# Patient Record
Sex: FEMALE | Race: WHITE | Employment: FULL TIME | URBAN - METROPOLITAN AREA
[De-identification: names, ages, dates, MRNs, and addresses within clinical notes are randomized per-mention and may not be internally consistent; named-entity substitution may affect disease eponyms.]

---

## 2017-01-05 ENCOUNTER — OFFICE VISIT (OUTPATIENT)
Dept: URGENT CARE | Facility: URGENT CARE | Age: 62
End: 2017-01-05
Payer: COMMERCIAL

## 2017-01-05 VITALS
DIASTOLIC BLOOD PRESSURE: 90 MMHG | SYSTOLIC BLOOD PRESSURE: 144 MMHG | OXYGEN SATURATION: 95 % | HEART RATE: 73 BPM | TEMPERATURE: 98.4 F

## 2017-01-05 DIAGNOSIS — J06.9 VIRAL URI WITH COUGH: Primary | ICD-10-CM

## 2017-01-05 DIAGNOSIS — J02.9 SORE THROAT: ICD-10-CM

## 2017-01-05 LAB
DEPRECATED S PYO AG THROAT QL EIA: NORMAL
MICRO REPORT STATUS: NORMAL
SPECIMEN SOURCE: NORMAL

## 2017-01-05 PROCEDURE — 87880 STREP A ASSAY W/OPTIC: CPT | Performed by: PHYSICIAN ASSISTANT

## 2017-01-05 PROCEDURE — 87081 CULTURE SCREEN ONLY: CPT | Performed by: PHYSICIAN ASSISTANT

## 2017-01-05 PROCEDURE — 99213 OFFICE O/P EST LOW 20 MIN: CPT | Performed by: PHYSICIAN ASSISTANT

## 2017-01-05 RX ORDER — BENZONATATE 200 MG/1
200 CAPSULE ORAL 3 TIMES DAILY PRN
Qty: 21 CAPSULE | Refills: 0 | Status: SHIPPED | OUTPATIENT
Start: 2017-01-05 | End: 2017-09-22

## 2017-01-05 NOTE — PROGRESS NOTES
"SUBJECTIVE:  Breonna Caruso presents to clinic today for evaluation of sore throat and a mild dry, non-productive cough x 3-4 days duration.  Positive hx of asthma.  She has had some associated wheezing.  She has current Albuterol MDI and has used it prn (reportedly with good response).  No severe SOB.  Patient tells me she has responded well to use of Tessalon Perles in past and would like an rx.       Patient denies any F/C/N/V/D, rash, abdominal pain or any other acute illness sxs.    Despite above acute illness sxs, patient still alert, active and taking in PO solids and fluids.  Normal BM's and urination.           OBJECTIVE:  /90 mmHg  Pulse 73  Temp(Src) 98.4  F (36.9  C) (Oral)  SpO2 95%    Vital Signs 7/10/2015 8/21/2015 8/21/2015 1/21/2016 6/17/2016   Systolic 134  138 140 120   Diastolic 76  84 99 90   Pulse   74 86 71   Temperature   98.1 98.1 97.9   Respirations 16       Weight (LB)   155 lb 1.6 oz 153 lb 11.2 oz 154 lb   Height    5' 5\" 5' 6\"   BMI    25.63 24.91   Peak Flow        Pain  5      O2 94  95 97 95     General appearance: alert and no apparent distress  Skin color is pink and without rash.  HEENT:   Conjunctiva not injected.  Sclera clear.  Left TM is normal: no effusions, no erythema, and normal landmarks.  Right TM is normal: no effusions, no erythema, and normal landmarks.  Nasal mucosa is normal.  Oropharyngeal exam is positive for mild, diffuse, erythema.  No plaque, exudate, lesions, or ulcers.   NBeck is supple with no adenopathy  CARDIAC:NORMAL - regular rate and rhythm without murmur.  RESP: Normal - CTA without rales, rhonchi, or wheezing.    Rapid Strep: Negative     ASSESSMENT/PLAN:    (J06.9,  B97.89) Viral URI with cough  (primary encounter diagnosis)  Plan: benzonatate (TESSALON) 200 MG capsule          Follow-up with PCPbif sxs change, worsen or fail to resolve with home comfort care measures over the next 5-7 days.  Discussed need to watch asthma and wheezing sxs " "closely.  No indication to start prednisone now but patient educated she may need prednisone if Albuterol rescue medication becomes insufficient to control wheezing.      In addition to the above, viral URI with wheezing\"red flag\" signs and sxs are reviewed with pt both verbally and by way of printed educational material for home review.  Pt verbalizes understanding of and agrees to the above plan.         (J02.9) Sore throat  Plan: Strep, Rapid Screen, Beta strep group A culture     As per above           "

## 2017-01-05 NOTE — PATIENT INSTRUCTIONS
* VIRAL RESPIRATORY ILLNESS w/ Wheezing [Adult]  You have an Upper Respiratory Illness (URI) caused by a virus. This illness is contagious during the first few days. It is spread through the air by coughing and sneezing or by direct contact (touching the sick person and then touching your own eyes, nose or mouth). When the infection causes a lot of irritation, the air passages can go into spasm. This causes wheezing and shortness of breath.    Most viral illnesses resolve within 7-10 days with rest and simple home remedies, although the illness may sometimes last for several weeks. Antibiotics will not kill a virus and are generally not prescribed for this condition.  HOME CARE:    If symptoms are severe, rest at home for the first 2-3 days. When resuming activity, don't let yourself become overly tired.    Avoid exposure to cigarette smoke (yours or others).    You may use acetaminophen (Tylenol) 650-1000 mg every 6 hours or ibuprofen (Motrin, Advil) 600 mg every 6-8 hours with food to control pain, if you are able to take these medicines. [ NOTE : If you have chronic liver or kidney disease or ever had a stomach ulcer or GI bleeding, talk with your doctor before using these medicines.] (Aspirin should never be used in anyone under 18 years of age who is ill with a fever. It may cause severe liver damage.    Your appetite may be poor so a light diet is fine. Avoid dehydration by drinking 6-8 glasses of fluids per day (water, soft drinks, juices, tea, soup, etc.). Extra fluids will help loosen secretions in the nose and lungs.    Over-the-counter cold medicines will not shorten the duration of the illness, but may be helpful for the following symptoms: cough (Robitussin DM); sore throat (Chloraseptic lozenges or spray); nasal and sinus congestion (Actifed or Sudafed). [NOTE: Do not use decongestants if you have high blood pressure.]  FOLLOW UP with your doctor or as advised if you are not improving over the next  week.  GET PROMPT MEDICAL ATTENTION if any of the following occur:    Cough with lots of colored sputum (mucus) or blood in your sputum    Chest pain, shortness of breath, wheezing or difficulty breathing    Severe headache; face, neck or ear pain    Fever over 101 F (38.3 C) for more than three days    Unable to swallow due to throat pain    3190-8939 [employer] [address], [place] [id number]. All rights reserved. This information is not intended as a substitute for professional medical care. Always follow your healthcare professional's instructions.

## 2017-01-05 NOTE — MR AVS SNAPSHOT
After Visit Summary   1/5/2017    Breonna Caruso    MRN: [id number]           Patient Information     Date Of Birth          1955        Visit Information        Provider Department      1/5/2017 2:30 PM Jose M Molina PA-C [facility]        Today's Diagnoses     Sore throat    -  1       Care Instructions       * VIRAL RESPIRATORY ILLNESS w/ Wheezing [Adult]  You have an Upper Respiratory Illness (URI) caused by a virus. This illness is contagious during the first few days. It is spread through the air by coughing and sneezing or by direct contact (touching the sick person and then touching your own eyes, nose or mouth). When the infection causes a lot of irritation, the air passages can go into spasm. This causes wheezing and shortness of breath.    Most viral illnesses resolve within 7-10 days with rest and simple home remedies, although the illness may sometimes last for several weeks. Antibiotics will not kill a virus and are generally not prescribed for this condition.  HOME CARE:    If symptoms are severe, rest at home for the first 2-3 days. When resuming activity, don't let yourself become overly tired.    Avoid exposure to cigarette smoke (yours or others).    You may use acetaminophen (Tylenol) 650-1000 mg every 6 hours or ibuprofen (Motrin, Advil) 600 mg every 6-8 hours with food to control pain, if you are able to take these medicines. [ NOTE : If you have chronic liver or kidney disease or ever had a stomach ulcer or GI bleeding, talk with your doctor before using these medicines.] (Aspirin should never be used in anyone under 18 years of age who is ill with a fever. It may cause severe liver damage.    Your appetite may be poor so a light diet is fine. Avoid dehydration by drinking 6-8 glasses of fluids per day (water, soft drinks, juices, tea, soup, etc.). Extra fluids will help loosen secretions in the nose and lungs.    Over-the-counter cold  medicines will not shorten the duration of the illness, but may be helpful for the following symptoms: cough (Robitussin DM); sore throat (Chloraseptic lozenges or spray); nasal and sinus congestion (Actifed or Sudafed). [NOTE: Do not use decongestants if you have high blood pressure.]  FOLLOW UP with your doctor or as advised if you are not improving over the next week.  GET PROMPT MEDICAL ATTENTION if any of the following occur:    Cough with lots of colored sputum (mucus) or blood in your sputum    Chest pain, shortness of breath, wheezing or difficulty breathing    Severe headache; face, neck or ear pain    Fever over 101 F (38.3 C) for more than three days    Unable to swallow due to throat pain    3257-0716 [facility], [address] [place] [address]. All rights reserved. This information is not intended as a substitute for professional medical care. Always follow your healthcare professional's instructions.          Follow-ups after your visit        Who to contact     If you have questions or need follow up information about today's clinic visit or your schedule please contact [facility] directly at [phone].  Normal or non-critical lab and imaging results will be communicated to you by BoostUphart, letter or phone within 4 business days after the clinic has received the results. If you do not hear from us within 7 days, please contact the clinic through BoostUphart or phone. If you have a critical or abnormal lab result, we will notify you by phone as soon as possible.  Submit refill requests through 1jiajie or call your pharmacy and they will forward the refill request to us. Please allow 3 business days for your refill to be completed.          Additional Information About Your Visit        BoostUphart Information     1jiajie gives you secure access to your electronic health record. If you see a primary care provider, you can also send messages to your care team and make  appointments. If you have questions, please call your primary care clinic.  If you do not have a primary care provider, please call [phone] and they will assist you.        Care EveryWhere ID     This is your Care EveryWhere ID. This could be used by other organizations to access your [facility] medical records  [id number]        Your Vitals Were     Pulse Temperature Pulse Oximetry             73 98.4  F (36.9  C) (Oral) 95%          Blood Pressure from Last 3 Encounters:   01/05/17 144/90   11/21/16 124/82   06/17/16 120/90    Weight from Last 3 Encounters:   11/21/16 153 lb (69.4 kg)   06/17/16 154 lb (69.854 kg)   01/21/16 153 lb 11.2 oz (69.718 kg)              We Performed the Following     Beta strep group A culture     Strep, Rapid Screen        Primary Care Provider Office Phone # Fax #    Ramona Ann Aaseby-Aguilera, PA-C [phone]       [facility] [address] [facility] [id number]        Thank you!     Thank you for choosing [facility]  for your care. Our goal is always to provide you with excellent care. Hearing back from our patients is one way we can continue to improve our services. Please take a few minutes to complete the written survey that you may receive in the mail after your visit with us. Thank you!             Your Updated Medication List - Protect others around you: Learn how to safely use, store and throw away your medicines at www.disposemymeds.org.          This list is accurate as of: 1/5/17  3:44 PM.  Always use your most recent med list.                   Brand Name Dispense Instructions for use    albuterol 108 (90 BASE) MCG/ACT Inhaler    VENTOLIN HFA    3 Inhaler    Inhale 1-2 puffs into the lungs 4 times daily as needed       beclomethasone 80 MCG/ACT Inhaler    QVAR    3 Inhaler    Inhale 2 puffs into the lungs 2 times daily with adaptor       buPROPion 200 MG 12 hr tablet    WELLBUTRIN SR    180 tablet    Take 1 tablet (200  mg) by mouth 2 times daily       DULoxetine 20 MG EC capsule    CYMBALTA    90 capsule    Take 1 capsule (20 mg) by mouth daily       esomeprazole 20 MG CR capsule    nexIUM    30 capsule    Take 1 capsule (20 mg) by mouth every morning (before breakfast) Take 30-60 minutes before eating.       estrogens-methylTESTOSTERone 1.25-2.5 MG per tablet    ESTRATEST    90 tablet    Take 1 tablet by mouth daily       fexofenadine 180 MG tablet    ALLEGRA    90 tablet    Take 1 tablet by mouth daily.       fluticasone 50 MCG/ACT spray    FLONASE    48 g    Spray 2 sprays into both nostrils daily       lisinopril 30 MG tablet    PRINIVIL,ZESTRIL    90 tablet    Take 1 tablet (30 mg) by mouth daily       simvastatin 40 MG tablet    ZOCOR    90 tablet    Take 1 tablet (40 mg) by mouth At Bedtime       SUMAtriptan 5 MG/ACT nasal spray    IMITREX    1 Inhaler    Spray 1 spray (5 mg) in nostril as needed for migraine       tretinoin 0.05 % cream    RETIN-A    45 g    Spread a pea size amount into affected area.  Use sunscreen SPF>20.

## 2017-01-05 NOTE — NURSING NOTE
"Chief Complaint   Patient presents with     Urgent Care     Pharyngitis     cough and ST since Monday.     Initial /90 mmHg  Pulse 73  Temp(Src) 98.4  F (36.9  C) (Oral)  SpO2 95% Estimated body mass index is 24.71 kg/(m^2) as calculated from the following:    Height as of 11/21/16: 5' 6\" (1.676 m).    Weight as of 11/21/16: 153 lb (69.4 kg).  BP completed using cuff size  regular    Traci Christopher/NELY    "

## 2017-01-07 LAB
BACTERIA SPEC CULT: NORMAL
MICRO REPORT STATUS: NORMAL
SPECIMEN SOURCE: NORMAL

## 2017-01-31 DIAGNOSIS — E78.5 DYSLIPIDEMIA: ICD-10-CM

## 2017-01-31 DIAGNOSIS — R68.82 DECREASED LIBIDO: Primary | ICD-10-CM

## 2017-01-31 DIAGNOSIS — F33.0 MAJOR DEPRESSIVE DISORDER, RECURRENT EPISODE, MILD (H): ICD-10-CM

## 2017-01-31 NOTE — TELEPHONE ENCOUNTER
Pt is calling in as she has to pay out of pocket $304.48 every 90 days for her EstraTest tablet.  Because it is compound her insurance will no longer cover it.  Is there another med she can try.    Explained that she needs to call her insurance co and ask if there is an alternative medication in the same family of medication that would be covered and to asked at what out of pocket expense.  She agreed to call her insurance and call us back once she know some alternatives    Do you have any meds for an alternative ?    Alessandro ZUÑIGA RN  .

## 2017-01-31 NOTE — TELEPHONE ENCOUNTER
Duloxetine 20mg caps    Last Written Prescription Date: 01/21/2016  Last Fill Quantity: 90, # refills: 3  Last Office Visit with [email or web address] or  Health prescribing provider: 06/07/2016        BP Readings from Last 3 Encounters:   01/05/17 144/90   11/21/16 124/82   06/17/16 120/90     Pulse: (for Fetzima)  CREATININE   Date Value Ref Range Status   01/23/2016 0.79 0.52 - 1.04 mg/dL Final   ]    Last PHQ-9 score on record=   PHQ-9 SCORE 11/21/2016   Total Score -   Total Score 3       Bupropion er 200mg tabs       Last Written Prescription Date: 01/21/2016  Last Fill Quantity: 180; # refills: 3  Last Office Visit with [email or web address] or  Health prescribing provider:  06/07/2016        Last PHQ-9 score on record=   PHQ-9 SCORE 11/21/2016   Total Score -   Total Score 3       AST       11   1/23/2016  ALT       28   1/23/2016    Simvastatin 40mg tabs     Last Written Prescription Date: 01/21/2016  Last Fill Quantity: 90, # refills: 3  Last Office Visit with [email or web address] or  Health prescribing provider: 06/07/2016       CHOL      168   1/23/2016  HDL       40   1/23/2016  LDL      107   1/23/2016  TRIG      104   1/23/2016  CHOLHDLRATIO      3.3   1/16/2015

## 2017-02-01 RX ORDER — SIMVASTATIN 40 MG
40 TABLET ORAL AT BEDTIME
Qty: 90 TABLET | Refills: 0 | Status: SHIPPED | OUTPATIENT
Start: 2017-02-01 | End: 2017-05-18

## 2017-02-01 RX ORDER — DULOXETIN HYDROCHLORIDE 20 MG/1
20 CAPSULE, DELAYED RELEASE ORAL DAILY
Qty: 90 CAPSULE | Refills: 0 | Status: SHIPPED | OUTPATIENT
Start: 2017-02-01 | End: 2017-05-15

## 2017-02-01 RX ORDER — ESTERIFIED ESTROGEN AND METHYLTESTOSTERONE 1.25; 2.5 MG/1; MG/1
1 TABLET ORAL DAILY
Qty: 90 TABLET | Refills: 3 | Status: SHIPPED | OUTPATIENT
Start: 2017-02-01 | End: 2017-05-09

## 2017-02-01 RX ORDER — BUPROPION HYDROCHLORIDE 200 MG/1
200 TABLET, EXTENDED RELEASE ORAL 2 TIMES DAILY
Qty: 180 TABLET | Refills: 0 | Status: SHIPPED | OUTPATIENT
Start: 2017-02-01 | End: 2017-05-18

## 2017-02-01 NOTE — TELEPHONE ENCOUNTER
Medication is being filled for 1 time refill only due to:  Patient needs to be seen because needs yearly OV and labs.   Poonam Taylor RN, BSN

## 2017-02-01 NOTE — TELEPHONE ENCOUNTER
I will forward these refill requests to the clinic.  It has been over 6 months since the last office visit (duloxetine/bupropion), and the labs for the simvastatin are out of date.    Thank you,    Lv Varela, [facility]  Pharmacy Services  [address] [place] [address]  [phone]

## 2017-03-01 NOTE — TELEPHONE ENCOUNTER
rx has been filled by [facility] on 2/1/17 and covered by insurance.  Encounter closed  Winifred Meng CMA

## 2017-03-21 DIAGNOSIS — I10 HTN, GOAL BELOW 140/90: ICD-10-CM

## 2017-03-21 RX ORDER — LISINOPRIL 30 MG/1
30 TABLET ORAL DAILY
Qty: 90 TABLET | Refills: 0 | Status: CANCELLED | OUTPATIENT
Start: 2017-03-21

## 2017-03-21 NOTE — TELEPHONE ENCOUNTER
Routing refill request to provider for review/approval because:  Labs not current:  Appears due for f/u in May    Delfina Tovar, RN

## 2017-03-21 NOTE — TELEPHONE ENCOUNTER
Lisinopril      Last Written Prescription Date: 12/6/2016  Last Fill Quantity: 90, # refills: 0  Last Office Visit with G, P or [facility] prescribing provider: 6/17/2016       Potassium   Date Value Ref Range Status   01/23/2016 4.0 3.4 - 5.3 mmol/L Final     Creatinine   Date Value Ref Range Status   01/23/2016 0.79 0.52 - 1.04 mg/dL Final     BP Readings from Last 3 Encounters:   01/05/17 144/90   11/21/16 124/82   06/17/16 120/90

## 2017-03-23 DIAGNOSIS — R68.82 DECREASED LIBIDO: ICD-10-CM

## 2017-03-23 RX ORDER — BUPROPION HYDROCHLORIDE 200 MG/1
TABLET, EXTENDED RELEASE ORAL
Qty: 180 TABLET | Refills: 0 | OUTPATIENT
Start: 2017-03-23

## 2017-03-23 RX ORDER — LISINOPRIL 30 MG/1
30 TABLET ORAL DAILY
Qty: 90 TABLET | Refills: 0 | Status: SHIPPED | OUTPATIENT
Start: 2017-03-23 | End: 2017-05-18

## 2017-03-23 NOTE — TELEPHONE ENCOUNTER
LM for call back pt needs f/u OV     This was filled for 90 day RX on 2/1/17 should have meds through May.     Delfina Tovar RN

## 2017-03-23 NOTE — TELEPHONE ENCOUNTER
Pending Prescriptions:                       Disp   Refills    buPROPion (WELLBUTRIN SR) 200 MG 12 hr ta*180 ta*0            Sig: TAKE ONE TABLET BY MOUTH TWICE A DAY    Bupropion       Last Written Prescription Date: 02/01/2017  Last Fill Quantity: 180; # refills: 0  Last Office Visit with [facility], [facility] or [facility] prescribing provider:  11/21/2016        Last PHQ-9 score on record=   PHQ-9 SCORE 11/21/2016   Total Score -   Total Score 3       Lab Results   Component Value Date    AST 11 01/23/2016     Lab Results   Component Value Date    ALT 28 01/23/2016     Kalpana Alexander

## 2017-05-05 DIAGNOSIS — R68.82 DECREASED LIBIDO: ICD-10-CM

## 2017-05-05 RX ORDER — ESTERIFIED ESTROGEN AND METHYLTESTOSTERONE 1.25; 2.5 MG/1; MG/1
1 TABLET ORAL DAILY
Qty: 90 TABLET | Refills: 3 | OUTPATIENT
Start: 2017-05-05

## 2017-05-05 NOTE — TELEPHONE ENCOUNTER
Est Estrogrens-Methylt      Last Written Prescription Date: 2/1/2017  Last Fill Quantity: 90,  # refills: 3   Last Office Visit with [facility], [facility] or [facility] prescribing provider: 6/17/2016

## 2017-05-09 DIAGNOSIS — R68.82 DECREASED LIBIDO: ICD-10-CM

## 2017-05-09 RX ORDER — ESTERIFIED ESTROGEN AND METHYLTESTOSTERONE 1.25; 2.5 MG/1; MG/1
1 TABLET ORAL DAILY
Qty: 90 TABLET | Refills: 2 | Status: SHIPPED | OUTPATIENT
Start: 2017-05-09 | End: 2017-11-30

## 2017-05-09 NOTE — TELEPHONE ENCOUNTER
Est Estrogens    - rx never received new rx  Last Written Prescription Date: 2/1/2017  Last Fill Quantity: 90,  # refills: 3   Last Office Visit with [facility], [facility] or [facility] prescribing provider: 6/17/2016

## 2017-05-18 ENCOUNTER — OFFICE VISIT (OUTPATIENT)
Dept: FAMILY MEDICINE | Facility: CLINIC | Age: 62
End: 2017-05-18
Payer: COMMERCIAL

## 2017-05-18 VITALS
SYSTOLIC BLOOD PRESSURE: 133 MMHG | DIASTOLIC BLOOD PRESSURE: 80 MMHG | HEART RATE: 77 BPM | TEMPERATURE: 97.8 F | WEIGHT: 149 LBS | BODY MASS INDEX: 23.95 KG/M2 | HEIGHT: 66 IN | OXYGEN SATURATION: 94 %

## 2017-05-18 DIAGNOSIS — Z13.29 SCREENING FOR THYROID DISORDER: ICD-10-CM

## 2017-05-18 DIAGNOSIS — R68.82 DECREASED LIBIDO: ICD-10-CM

## 2017-05-18 DIAGNOSIS — I10 HTN, GOAL BELOW 140/90: ICD-10-CM

## 2017-05-18 DIAGNOSIS — F33.0 MAJOR DEPRESSIVE DISORDER, RECURRENT EPISODE, MILD (H): ICD-10-CM

## 2017-05-18 DIAGNOSIS — Z13.0 SCREENING FOR DISORDER OF BLOOD AND BLOOD-FORMING ORGANS: ICD-10-CM

## 2017-05-18 DIAGNOSIS — Z13.220 LIPID SCREENING: ICD-10-CM

## 2017-05-18 DIAGNOSIS — Z13.1 SCREENING FOR DIABETES MELLITUS: ICD-10-CM

## 2017-05-18 DIAGNOSIS — E78.5 DYSLIPIDEMIA: ICD-10-CM

## 2017-05-18 DIAGNOSIS — Z11.59 NEED FOR HEPATITIS C SCREENING TEST: ICD-10-CM

## 2017-05-18 LAB
ALBUMIN SERPL-MCNC: 4 G/DL (ref 3.4–5)
ALP SERPL-CCNC: 46 U/L (ref 40–150)
ALT SERPL W P-5'-P-CCNC: 71 U/L (ref 0–50)
ANION GAP SERPL CALCULATED.3IONS-SCNC: 6 MMOL/L (ref 3–14)
AST SERPL W P-5'-P-CCNC: 34 U/L (ref 0–45)
BILIRUB SERPL-MCNC: 0.4 MG/DL (ref 0.2–1.3)
BUN SERPL-MCNC: 14 MG/DL (ref 7–30)
CALCIUM SERPL-MCNC: 8.9 MG/DL (ref 8.5–10.1)
CHLORIDE SERPL-SCNC: 106 MMOL/L (ref 94–109)
CHOLEST SERPL-MCNC: 170 MG/DL
CO2 SERPL-SCNC: 29 MMOL/L (ref 20–32)
CREAT SERPL-MCNC: 0.71 MG/DL (ref 0.52–1.04)
ERYTHROCYTE [DISTWIDTH] IN BLOOD BY AUTOMATED COUNT: 13 % (ref 10–15)
GFR SERPL CREATININE-BSD FRML MDRD: 83 ML/MIN/1.7M2
GLUCOSE SERPL-MCNC: 93 MG/DL (ref 70–99)
HCT VFR BLD AUTO: 44.4 % (ref 35–47)
HCV AB SERPL QL IA: NORMAL
HDLC SERPL-MCNC: 46 MG/DL
HGB BLD-MCNC: 14.9 G/DL (ref 11.7–15.7)
LDLC SERPL CALC-MCNC: 104 MG/DL
MCH RBC QN AUTO: 31.5 PG (ref 26.5–33)
MCHC RBC AUTO-ENTMCNC: 33.6 G/DL (ref 31.5–36.5)
MCV RBC AUTO: 94 FL (ref 78–100)
NONHDLC SERPL-MCNC: 124 MG/DL
PLATELET # BLD AUTO: 231 10E9/L (ref 150–450)
POTASSIUM SERPL-SCNC: 4.1 MMOL/L (ref 3.4–5.3)
PROT SERPL-MCNC: 7.1 G/DL (ref 6.8–8.8)
RBC # BLD AUTO: 4.73 10E12/L (ref 3.8–5.2)
SODIUM SERPL-SCNC: 141 MMOL/L (ref 133–144)
TRIGL SERPL-MCNC: 98 MG/DL
TSH SERPL DL<=0.005 MIU/L-ACNC: 1.47 MU/L (ref 0.4–4)
WBC # BLD AUTO: 4.2 10E9/L (ref 4–11)

## 2017-05-18 PROCEDURE — 36415 COLL VENOUS BLD VENIPUNCTURE: CPT | Performed by: PHYSICIAN ASSISTANT

## 2017-05-18 PROCEDURE — 80061 LIPID PANEL: CPT | Performed by: PHYSICIAN ASSISTANT

## 2017-05-18 PROCEDURE — 80053 COMPREHEN METABOLIC PANEL: CPT | Performed by: PHYSICIAN ASSISTANT

## 2017-05-18 PROCEDURE — 84443 ASSAY THYROID STIM HORMONE: CPT | Performed by: PHYSICIAN ASSISTANT

## 2017-05-18 PROCEDURE — 99214 OFFICE O/P EST MOD 30 MIN: CPT | Performed by: PHYSICIAN ASSISTANT

## 2017-05-18 PROCEDURE — 86803 HEPATITIS C AB TEST: CPT | Performed by: PHYSICIAN ASSISTANT

## 2017-05-18 PROCEDURE — 85027 COMPLETE CBC AUTOMATED: CPT | Performed by: PHYSICIAN ASSISTANT

## 2017-05-18 RX ORDER — LISINOPRIL 30 MG/1
30 TABLET ORAL DAILY
Qty: 90 TABLET | Refills: 3 | Status: SHIPPED | OUTPATIENT
Start: 2017-05-18 | End: 2018-04-24

## 2017-05-18 RX ORDER — BUPROPION HYDROCHLORIDE 200 MG/1
200 TABLET, EXTENDED RELEASE ORAL 2 TIMES DAILY
Qty: 180 TABLET | Refills: 3 | Status: SHIPPED | OUTPATIENT
Start: 2017-05-18 | End: 2018-04-24

## 2017-05-18 RX ORDER — SIMVASTATIN 40 MG
40 TABLET ORAL AT BEDTIME
Qty: 90 TABLET | Refills: 3 | Status: SHIPPED | OUTPATIENT
Start: 2017-05-18 | End: 2017-09-22

## 2017-05-18 RX ORDER — DULOXETIN HYDROCHLORIDE 20 MG/1
20 CAPSULE, DELAYED RELEASE ORAL DAILY
Qty: 90 CAPSULE | Refills: 3 | Status: SHIPPED | OUTPATIENT
Start: 2017-05-18 | End: 2018-04-24

## 2017-05-18 NOTE — PROGRESS NOTES
SUBJECTIVE:                                                    Breonna Caruso is a 62 year old female who presents to clinic today for the following health issues:      Hyperlipidemia Follow-Up      Rate your low fat/cholesterol diet?: good    Taking statin?  Yes, no muscle aches from statin    Other lipid medications/supplements?:  none     Hypertension Follow-up      Outpatient blood pressures are not being checked.    Low Salt Diet: not monitoring salt       Depression and Anxiety Follow-Up    Status since last visit: No change    Other associated symptoms:None    Complicating factors:     Significant life event: No     Current substance abuse: None    PHQ-9 SCORE 1/21/2016 11/21/2016 5/18/2017   Total Score - - -   Total Score 0 3 0     BENNY-7 SCORE 9/29/2014 5/26/2015 1/21/2016   Total Score 0 3 -   Total Score - - 4        PHQ-9  English      PHQ-9   Any Language     GAD7     Asthma Follow-Up    Was ACT completed today?    Yes    ACT Total Scores 5/18/2017   ACT TOTAL SCORE (Goal Greater than or Equal to 20) 21   In the past 12 months, how many times did you visit the emergency room for your asthma without being admitted to the hospital? 0   In the past 12 months, how many times were you hospitalized overnight because of your asthma? 0       Recent asthma triggers that patient is dealing with: None        Amount of exercise or physical activity: None    Problems taking medications regularly: No    Medication side effects: none    Diet: regular (no restrictions)          Problem list and histories reviewed & adjusted, as indicated.  Additional history: as documented    Allergies   Allergen Reactions     Ciprofloxacin Swelling     Facial swelling     Codeine Sulfate      rash     Lorcet [Hydrocodone-Acetaminophen]      Penicillin G      Rash, hives     Sulfa Drugs      Rash, hives       Reviewed and updated as needed this visit by clinical staff  Tobacco  Allergies  Meds  Med Hx  Surg Hx  Fam Hx  Soc Hx  "     Reviewed and updated as needed this visit by Provider         ROS:  Constitutional, HEENT, cardiovascular, pulmonary, gi and gu systems are negative, except as otherwise noted.    OBJECTIVE:                                                    /80 (BP Location: Right arm, Patient Position: Chair, Cuff Size: Adult Regular)  Pulse 77  Temp 97.8  F (36.6  C) (Oral)  Ht 5' 6\" (1.676 m)  Wt 149 lb (67.6 kg)  SpO2 94%  BMI 24.05 kg/m2  Body mass index is 24.05 kg/(m^2).  GENERAL APPEARANCE: healthy, alert and no distress  HENT: ear canals and TM's normal and nose and mouth without ulcers or lesions  RESP: lungs clear to auscultation - no rales, rhonchi or wheezes  BREAST: normal without masses, tenderness or nipple discharge and no palpable axillary masses or adenopathy  CV: regular rates and rhythm, normal S1 S2, no S3 or S4 and no murmur, click or rub  LYMPHATICS: normal ant/post cervical and supraclavicular nodes  ABDOMEN: soft, nontender, without hepatosplenomegaly or masses and bowel sounds normal  MS: extremities normal- no gross deformities noted  SKIN: no suspicious lesions or rashes  PSYCH: mentation appears normal and affect normal/bright    Diagnostic test results:  Diagnostic Test Results:  none      ASSESSMENT/PLAN:                                                    1. Major depressive disorder, recurrent episode, mild (H)  Stable and doing well   - DULoxetine (CYMBALTA) 20 MG EC capsule; Take 1 capsule (20 mg) by mouth daily  Dispense: 90 capsule; Refill: 3    2. Dyslipidemia    - simvastatin (ZOCOR) 40 MG tablet; Take 1 tablet (40 mg) by mouth At Bedtime  Dispense: 90 tablet; Refill: 3    3. Screening for diabetes mellitus    - Comprehensive metabolic panel    4. Screening for thyroid disorder    - TSH with free T4 reflex    5. Lipid screening    - Lipid panel reflex to direct LDL    6. Screening for disorder of blood and blood-forming organs    - CBC with platelets    7. Need for hepatitis C " screening test    - Hepatitis C Screen Reflex to HCV RNA Quant and Genotype    8. HTN, goal below 140/90  Stable and doing well   - lisinopril (PRINIVIL,ZESTRIL) 30 MG tablet; Take 1 tablet (30 mg) by mouth daily  Dispense: 90 tablet; Refill: 3    9. Decreased libido  Works well. Having trouble with insurance but did get it approved  - buPROPion (WELLBUTRIN SR) 200 MG 12 hr tablet; Take 1 tablet (200 mg) by mouth 2 times daily  Dispense: 180 tablet; Refill: 3      There are no Patient Instructions on file for this visit.    Ramona Ann Aaseby-Aguilera, PA-C  [facility]

## 2017-05-18 NOTE — NURSING NOTE
"Chief Complaint   Patient presents with     Recheck Medication       Initial /80 (BP Location: Right arm, Patient Position: Chair, Cuff Size: Adult Regular)  Pulse 77  Temp 97.8  F (36.6  C) (Oral)  Ht 5' 6\" (1.676 m)  Wt 149 lb (67.6 kg)  SpO2 94%  BMI 24.05 kg/m2 Estimated body mass index is 24.05 kg/(m^2) as calculated from the following:    Height as of this encounter: 5' 6\" (1.676 m).    Weight as of this encounter: 149 lb (67.6 kg).  Medication Reconciliation: complete Malgorzata Hurst CMA      "

## 2017-05-18 NOTE — MR AVS SNAPSHOT
After Visit Summary   5/18/2017    Breonna Caruso    MRN: [id number]           Patient Information     Date Of Birth          1955        Visit Information        Provider Department      5/18/2017 8:30 AM Aaseby-Aguilera, Ramona Ann, PA-C [facility]        Today's Diagnoses     Major depressive disorder, recurrent episode, mild (H)        Dyslipidemia        Screening for diabetes mellitus        Screening for thyroid disorder        Lipid screening        Screening for disorder of blood and blood-forming organs        Need for hepatitis C screening test        HTN, goal below 140/90        Decreased libido           Follow-ups after your visit        Who to contact     If you have questions or need follow up information about today's clinic visit or your schedule please contact [facility] directly at [phone].  Normal or non-critical lab and imaging results will be communicated to you by MyChart, letter or phone within 4 business days after the clinic has received the results. If you do not hear from us within 7 days, please contact the clinic through [employer] or phone. If you have a critical or abnormal lab result, we will notify you by phone as soon as possible.  Submit refill requests through [employer] or call your pharmacy and they will forward the refill request to us. Please allow 3 business days for your refill to be completed.          Additional Information About Your Visit        MyChart Information     [employer] gives you secure access to your electronic health record. If you see a primary care provider, you can also send messages to your care team and make appointments. If you have questions, please call your primary care clinic.  If you do not have a primary care provider, please call [phone] and they will assist you.        Care EveryWhere ID     This is your Care EveryWhere ID. This could be used by other organizations to access your [place]  "medical records  [id number]        Your Vitals Were     Pulse Temperature Height Pulse Oximetry BMI (Body Mass Index)       77 97.8  F (36.6  C) (Oral) 5' 6\" (1.676 m) 94% 24.05 kg/m2        Blood Pressure from Last 3 Encounters:   05/18/17 133/80   01/05/17 144/90   11/21/16 124/82    Weight from Last 3 Encounters:   05/18/17 149 lb (67.6 kg)   11/21/16 153 lb (69.4 kg)   06/17/16 154 lb (69.9 kg)              We Performed the Following     CBC with platelets     Comprehensive metabolic panel     Hepatitis C Screen Reflex to HCV RNA Quant and Genotype     Lipid panel reflex to direct LDL     TSH with free T4 reflex          Today's Medication Changes          These changes are accurate as of: 5/18/17 10:03 AM.  If you have any questions, ask your nurse or doctor.               These medicines have changed or have updated prescriptions.        Dose/Directions    DULoxetine 20 MG EC capsule   Commonly known as:  CYMBALTA   This may have changed:  See the new instructions.   Used for:  Major depressive disorder, recurrent episode, mild (H)   Changed by:  Aaseby-Aguilera, Ramona Ann, PA-C        Dose:  20 mg   Take 1 capsule (20 mg) by mouth daily   Quantity:  90 capsule   Refills:  3            Where to get your medicines      These medications were sent to [facility] - [place], [place] - [address]  [address] [facility] [id number]    Hours:  Mon-Fri 8:30am-5:00pm Toll Free [phone] Phone:  [phone]     buPROPion 200 MG 12 hr tablet    DULoxetine 20 MG EC capsule    lisinopril 30 MG tablet    simvastatin 40 MG tablet                Primary Care Provider Office Phone # Fax #    Ramona Ann Aaseby-Aguilera, PA-C [phone]       [facility] [address] [facility] [address]        Thank you!     Thank you for choosing [facility]  for your care. Our goal is always to provide you with excellent care. Hearing back from " our patients is one way we can continue to improve our services. Please take a few minutes to complete the written survey that you may receive in the mail after your visit with us. Thank you!             Your Updated Medication List - Protect others around you: Learn how to safely use, store and throw away your medicines at www.disposemymeds.org.          This list is accurate as of: 5/18/17 10:03 AM.  Always use your most recent med list.                   Brand Name Dispense Instructions for use    albuterol 108 (90 BASE) MCG/ACT Inhaler    VENTOLIN HFA    3 Inhaler    Inhale 1-2 puffs into the lungs 4 times daily as needed       beclomethasone 80 MCG/ACT Inhaler    QVAR    3 Inhaler    Inhale 2 puffs into the lungs 2 times daily with adaptor       benzonatate 200 MG capsule    TESSALON    21 capsule    Take 1 capsule (200 mg) by mouth 3 times daily as needed for cough       buPROPion 200 MG 12 hr tablet    WELLBUTRIN SR    180 tablet    Take 1 tablet (200 mg) by mouth 2 times daily       DULoxetine 20 MG EC capsule    CYMBALTA    90 capsule    Take 1 capsule (20 mg) by mouth daily       esomeprazole 20 MG CR capsule    nexIUM    30 capsule    Take 1 capsule (20 mg) by mouth every morning (before breakfast) Take 30-60 minutes before eating.       estrogens-methylTESTOSTERone 1.25-2.5 MG per tablet    ESTRATEST    90 tablet    Take 1 tablet by mouth daily       fexofenadine 180 MG tablet    ALLEGRA    90 tablet    Take 1 tablet by mouth daily.       fluticasone 50 MCG/ACT spray    FLONASE    48 g    Spray 2 sprays into both nostrils daily       lisinopril 30 MG tablet    PRINIVIL,ZESTRIL    90 tablet    Take 1 tablet (30 mg) by mouth daily       simvastatin 40 MG tablet    ZOCOR    90 tablet    Take 1 tablet (40 mg) by mouth At Bedtime       SUMAtriptan 5 MG/ACT nasal spray    IMITREX    1 Inhaler    Spray 1 spray (5 mg) in nostril as needed for migraine       tretinoin 0.05 % cream    RETIN-A    45 g    Spread a  pea size amount into affected area.  Use sunscreen SPF>20.

## 2017-05-19 ASSESSMENT — PATIENT HEALTH QUESTIONNAIRE - PHQ9: SUM OF ALL RESPONSES TO PHQ QUESTIONS 1-9: 0

## 2017-05-19 ASSESSMENT — ASTHMA QUESTIONNAIRES: ACT_TOTALSCORE: 21

## 2017-05-30 ENCOUNTER — OFFICE VISIT (OUTPATIENT)
Dept: FAMILY MEDICINE | Facility: CLINIC | Age: 62
End: 2017-05-30
Payer: COMMERCIAL

## 2017-05-30 VITALS
BODY MASS INDEX: 24.27 KG/M2 | RESPIRATION RATE: 16 BRPM | HEART RATE: 81 BPM | WEIGHT: 151 LBS | OXYGEN SATURATION: 96 % | HEIGHT: 66 IN | DIASTOLIC BLOOD PRESSURE: 78 MMHG | SYSTOLIC BLOOD PRESSURE: 126 MMHG | TEMPERATURE: 98 F

## 2017-05-30 DIAGNOSIS — R82.90 NONSPECIFIC FINDING ON EXAMINATION OF URINE: ICD-10-CM

## 2017-05-30 DIAGNOSIS — N30.01 ACUTE CYSTITIS WITH HEMATURIA: ICD-10-CM

## 2017-05-30 DIAGNOSIS — R30.0 DYSURIA: Primary | ICD-10-CM

## 2017-05-30 LAB
ALBUMIN UR-MCNC: 30 MG/DL
APPEARANCE UR: CLEAR
BILIRUB UR QL STRIP: NEGATIVE
COLOR UR AUTO: YELLOW
GLUCOSE UR STRIP-MCNC: NEGATIVE MG/DL
HGB UR QL STRIP: ABNORMAL
KETONES UR STRIP-MCNC: NEGATIVE MG/DL
LEUKOCYTE ESTERASE UR QL STRIP: ABNORMAL
NITRATE UR QL: NEGATIVE
PH UR STRIP: 6 PH (ref 5–7)
RBC #/AREA URNS AUTO: ABNORMAL /HPF (ref 0–2)
SP GR UR STRIP: 1.01 (ref 1–1.03)
URN SPEC COLLECT METH UR: ABNORMAL
UROBILINOGEN UR STRIP-ACNC: 0.2 EU/DL (ref 0.2–1)
WBC #/AREA URNS AUTO: ABNORMAL /HPF (ref 0–2)

## 2017-05-30 PROCEDURE — 99213 OFFICE O/P EST LOW 20 MIN: CPT | Performed by: NURSE PRACTITIONER

## 2017-05-30 PROCEDURE — 81001 URINALYSIS AUTO W/SCOPE: CPT | Performed by: NURSE PRACTITIONER

## 2017-05-30 PROCEDURE — 87086 URINE CULTURE/COLONY COUNT: CPT | Performed by: NURSE PRACTITIONER

## 2017-05-30 PROCEDURE — 87186 SC STD MICRODIL/AGAR DIL: CPT | Performed by: NURSE PRACTITIONER

## 2017-05-30 RX ORDER — NITROFURANTOIN 25; 75 MG/1; MG/1
100 CAPSULE ORAL 2 TIMES DAILY
Qty: 14 CAPSULE | Refills: 0 | Status: SHIPPED | OUTPATIENT
Start: 2017-05-30 | End: 2017-09-22

## 2017-05-30 NOTE — NURSING NOTE
"Chief Complaint   Patient presents with     UTI       Initial /78  Pulse 81  Temp 98  F (36.7  C) (Oral)  Resp 16  Ht 5' 6\" (1.676 m)  Wt 151 lb (68.5 kg)  SpO2 96%  BMI 24.37 kg/m2 Estimated body mass index is 24.37 kg/(m^2) as calculated from the following:    Height as of this encounter: 5' 6\" (1.676 m).    Weight as of this encounter: 151 lb (68.5 kg).  Medication Reconciliation: complete       Katiuska Chaparro  CMA      "

## 2017-05-30 NOTE — PATIENT INSTRUCTIONS
Urinary Tract Infections in Women  Urinary tract infections (UTIs) are most often caused by bacteria (germs). These bacteria enter the urinary tract. The bacteria may come from outside the body. Or they may travel from the skin outside the rectum or vagina into the urethra. Female anatomy makes it easier for bacteria from the bowel to enter a woman s urinary tract, which is the most common source of UTI. This means women develop UTIs more often than men. Pain in or around the urinary tract is a common UTI symptom. But the only way to know for sure if you have a UTI for the health care provider to test your urine. The two tests that may be done are the urinalysis and urine culture.  Types of UTIs    Cystitis: A bladder infection (cystitis) is the most common UTI in women. You may have urgent or frequent urination. You may also have pain, burning when you urinate, and bloody urine.    Urethritis: This is an inflamed urethra, which is the tube that carries urine from the bladder to outside the body. You may have lower stomach or back pain. You may also have urgent or frequent urination.    Pyelonephritis: This is a kidney infection. If not treated, it can be serious and damage your kidneys. In severe cases, you may be hospitalized. You may have a fever and lower back pain.  Medications to treat a UTI  Most UTIs are treated with antibiotics. These kill the bacteria. The length of time you need to take them depends on the type of infection. It may be as short as 3 days. If you have repeated UTIs, a low-dose antibiotic may be needed for several months. Take antibiotics exactly as directed. Don t stop taking them until all of the medication is gone. If you stop taking the antibiotic too soon, the infection may not go away, and you may develop a resistance to the antibiotic. This can make it much harder to treat.  Lifestyle changes to treat and prevent UTIs  The lifestyle changes below will help get rid of your UTI. They  may also help prevent future UTIs.    Drink plenty of fluids. This includes water, juice, or other caffeine-free drinks. Fluids help flush bacteria out of your body.    Empty your bladder. Always empty your bladder when you feel the urge to urinate. And always urinate before going to sleep. Urine that stays in your bladder can lead to infection. Try to urinate before and after sex as well.    Practice good personal hygiene. Wipe yourself from front to back after using the toilet. This helps keep bacteria from getting into the urethra.    Use condoms during sex. These help prevent UTIs caused by sexually transmitted bacteria. Also, avoid using spermicides during sex. These can increase the risk of UTIs. Choose other forms of birth control instead. For women who tend to get UTIs after sex, a low-dose of a preventive antibiotic may be used. Be sure to discuss this option with your health care provider.    Follow up with your health care provider as directed. He or she may test to make sure the infection has cleared. If necessary, additional treatment may be started.    2917-7134 [employer]. [address] [place] [address], [place] [address]. All rights reserved. This information is not intended as a substitute for professional medical care. Always follow your healthcare professional's instructions.

## 2017-05-30 NOTE — MR AVS SNAPSHOT
After Visit Summary   5/30/2017    Breonna Caruso    MRN: [id number]           Patient Information     Date Of Birth          1955        Visit Information        Provider Department      5/30/2017 3:45 PM Chris Feliciano APRN [facility]        Today's Diagnoses     Dysuria    -  1    Nonspecific finding on examination of urine        Acute cystitis with hematuria          Care Instructions      Urinary Tract Infections in Women  Urinary tract infections (UTIs) are most often caused by bacteria (germs). These bacteria enter the urinary tract. The bacteria may come from outside the body. Or they may travel from the skin outside the rectum or vagina into the urethra. Female anatomy makes it easier for bacteria from the bowel to enter a woman s urinary tract, which is the most common source of UTI. This means women develop UTIs more often than men. Pain in or around the urinary tract is a common UTI symptom. But the only way to know for sure if you have a UTI for the health care provider to test your urine. The two tests that may be done are the urinalysis and urine culture.  Types of UTIs    Cystitis: A bladder infection (cystitis) is the most common UTI in women. You may have urgent or frequent urination. You may also have pain, burning when you urinate, and bloody urine.    Urethritis: This is an inflamed urethra, which is the tube that carries urine from the bladder to outside the body. You may have lower stomach or back pain. You may also have urgent or frequent urination.    Pyelonephritis: This is a kidney infection. If not treated, it can be serious and damage your kidneys. In severe cases, you may be hospitalized. You may have a fever and lower back pain.  Medications to treat a UTI  Most UTIs are treated with antibiotics. These kill the bacteria. The length of time you need to take them depends on the type of infection. It may be as short as 3 days. If you  have repeated UTIs, a low-dose antibiotic may be needed for several months. Take antibiotics exactly as directed. Don t stop taking them until all of the medication is gone. If you stop taking the antibiotic too soon, the infection may not go away, and you may develop a resistance to the antibiotic. This can make it much harder to treat.  Lifestyle changes to treat and prevent UTIs  The lifestyle changes below will help get rid of your UTI. They may also help prevent future UTIs.    Drink plenty of fluids. This includes water, juice, or other caffeine-free drinks. Fluids help flush bacteria out of your body.    Empty your bladder. Always empty your bladder when you feel the urge to urinate. And always urinate before going to sleep. Urine that stays in your bladder can lead to infection. Try to urinate before and after sex as well.    Practice good personal hygiene. Wipe yourself from front to back after using the toilet. This helps keep bacteria from getting into the urethra.    Use condoms during sex. These help prevent UTIs caused by sexually transmitted bacteria. Also, avoid using spermicides during sex. These can increase the risk of UTIs. Choose other forms of birth control instead. For women who tend to get UTIs after sex, a low-dose of a preventive antibiotic may be used. Be sure to discuss this option with your health care provider.    Follow up with your health care provider as directed. He or she may test to make sure the infection has cleared. If necessary, additional treatment may be started.    1274-3703 [employer]. [address] [place] [address], [place] [address]. All rights reserved. This information is not intended as a substitute for professional medical care. Always follow your healthcare professional's instructions.                Follow-ups after your visit        Who to contact     If you have questions or need follow up information about today's clinic visit or your schedule please  "contact [facility] directly at [phone].  Normal or non-critical lab and imaging results will be communicated to you by MyChart, letter or phone within 4 business days after the clinic has received the results. If you do not hear from us within 7 days, please contact the clinic through [employer] or phone. If you have a critical or abnormal lab result, we will notify you by phone as soon as possible.  Submit refill requests through [employer] or call your pharmacy and they will forward the refill request to us. Please allow 3 business days for your refill to be completed.          Additional Information About Your Visit        [employer] Information     [employer] gives you secure access to your electronic health record. If you see a primary care provider, you can also send messages to your care team and make appointments. If you have questions, please call your primary care clinic.  If you do not have a primary care provider, please call [phone] and they will assist you.        Care EveryWhere ID     This is your Care EveryWhere ID. This could be used by other organizations to access your [place] medical records  [id number]        Your Vitals Were     Pulse Temperature Respirations Height Pulse Oximetry BMI (Body Mass Index)    81 98  F (36.7  C) (Oral) 16 5' 6\" (1.676 m) 96% 24.37 kg/m2       Blood Pressure from Last 3 Encounters:   05/30/17 126/78   05/18/17 133/80   01/05/17 144/90    Weight from Last 3 Encounters:   05/30/17 151 lb (68.5 kg)   05/18/17 149 lb (67.6 kg)   11/21/16 153 lb (69.4 kg)              We Performed the Following     *UA reflex to Microscopic and Culture ([place] and [facility] (except [place] and [place])     Urine Culture Aerobic Bacterial     Urine Microscopic          Today's Medication Changes          These changes are accurate as of: 5/30/17  4:00 PM.  If you have any questions, ask your nurse or doctor.               Start taking these medicines.        " Dose/Directions    nitrofurantoin (macrocrystal-monohydrate) 100 MG capsule   Commonly known as:  MACROBID   Used for:  Acute cystitis with hematuria   Started by:  Chris Feliciano APRN CNP        Dose:  100 mg   Take 1 capsule (100 mg) by mouth 2 times daily   Quantity:  14 capsule   Refills:  0            Where to get your medicines      These medications were sent to [facility]/pharmacy #[id number] - [place], [place] - [address] [facility]  [address] [facility] [address]    Hours:  [facility] Phone:  [phone]     nitrofurantoin (macrocrystal-monohydrate) 100 MG capsule                Primary Care Provider Office Phone # Fax #    Ramona Ann Aaseby-Aguilera, PA-C [phone]       [facility] [address]  [facility] [address]        Thank you!     Thank you for choosing [facility]  for your care. Our goal is always to provide you with excellent care. Hearing back from our patients is one way we can continue to improve our services. Please take a few minutes to complete the written survey that you may receive in the mail after your visit with us. Thank you!             Your Updated Medication List - Protect others around you: Learn how to safely use, store and throw away your medicines at www.disposemymeds.org.          This list is accurate as of: 5/30/17  4:00 PM.  Always use your most recent med list.                   Brand Name Dispense Instructions for use    albuterol 108 (90 BASE) MCG/ACT Inhaler    VENTOLIN HFA    3 Inhaler    Inhale 1-2 puffs into the lungs 4 times daily as needed       beclomethasone 80 MCG/ACT Inhaler    QVAR    3 Inhaler    Inhale 2 puffs into the lungs 2 times daily with adaptor       benzonatate 200 MG capsule    TESSALON    21 capsule    Take 1 capsule (200 mg) by mouth 3 times daily as needed for cough       buPROPion 200 MG 12 hr tablet    WELLBUTRIN SR    180 tablet    Take 1 tablet (200 mg) by mouth 2 times  daily       DULoxetine 20 MG EC capsule    CYMBALTA    90 capsule    Take 1 capsule (20 mg) by mouth daily       esomeprazole 20 MG CR capsule    nexIUM    30 capsule    Take 1 capsule (20 mg) by mouth every morning (before breakfast) Take 30-60 minutes before eating.       estrogens-methylTESTOSTERone 1.25-2.5 MG per tablet    ESTRATEST    90 tablet    Take 1 tablet by mouth daily       fexofenadine 180 MG tablet    ALLEGRA    90 tablet    Take 1 tablet by mouth daily.       fluticasone 50 MCG/ACT spray    FLONASE    48 g    Spray 2 sprays into both nostrils daily       lisinopril 30 MG tablet    PRINIVIL,ZESTRIL    90 tablet    Take 1 tablet (30 mg) by mouth daily       nitrofurantoin (macrocrystal-monohydrate) 100 MG capsule    MACROBID    14 capsule    Take 1 capsule (100 mg) by mouth 2 times daily       simvastatin 40 MG tablet    ZOCOR    90 tablet    Take 1 tablet (40 mg) by mouth At Bedtime       SUMAtriptan 5 MG/ACT nasal spray    IMITREX    1 Inhaler    Spray 1 spray (5 mg) in nostril as needed for migraine       tretinoin 0.05 % cream    RETIN-A    45 g    Spread a pea size amount into affected area.  Use sunscreen SPF>20.

## 2017-05-30 NOTE — PROGRESS NOTES
"  SUBJECTIVE:                                                    Breonna Caruso is a 62 year old female who presents to clinic today for the following health issues:      URINARY TRACT SYMPTOMS      Duration: 1 week    Description  Sharp pain in right lower back, pelvic pain  Nausea, fequency    Intensity:  moderate    Accompanying signs and symptoms:  Fever/chills: no   Flank pain no   Nausea and vomiting: YES  Vaginal symptoms: none  Abdominal/Pelvic Pain: YES    History  History of frequent UTI's: YES  History of kidney stones: no   Sexually Active: YES  Possibility of pregnancy: No    Precipitating or alleviating factors: None    Therapies tried and outcome:  none           Reviewed and updated as needed this visit by clinical staff       Reviewed and updated as needed this visit by Provider         ROS:  Constitutional, HEENT, cardiovascular, pulmonary, gi and gu systems are negative, except as otherwise noted.    OBJECTIVE:                                                    /78  Pulse 81  Temp 98  F (36.7  C) (Oral)  Resp 16  Ht 5' 6\" (1.676 m)  Wt 151 lb (68.5 kg)  SpO2 96%  BMI 24.37 kg/m2  Body mass index is 24.37 kg/(m^2).   middle-aged female in no acute distress. Nontoxic looking. A soft nondistended abdomen with bowel sounds active throughout. Slight left-sided pelvic discomfort to palpation without any rebound tenderness. Left flank discomfort to percussion. No spinal column tenderness. Otherwise lung sounds were clear to auscultation throughout. Heart was of  regular rhythm and rate.    Diagnostic Test Results:  Results for orders placed or performed in visit on 05/30/17 (from the past 24 hour(s))   *UA reflex to Microscopic and Culture ([place] and [facility] (except [place] and [place])   Result Value Ref Range    Color Urine Yellow     Appearance Urine Clear     Glucose Urine Negative NEG mg/dL    Bilirubin Urine Negative NEG    Ketones Urine Negative NEG mg/dL    " Specific Gravity Urine 1.010 1.003 - 1.035    Blood Urine Moderate (A) NEG    pH Urine 6.0 5.0 - 7.0 pH    Protein Albumin Urine 30 (A) NEG mg/dL    Urobilinogen Urine 0.2 0.2 - 1.0 EU/dL    Nitrite Urine Negative NEG    Leukocyte Esterase Urine (A) NEG     Moderate  CULTURED.  CORRECTED ON 05/30 AT 1536: PREVIOUSLY REPORTED AS Moderate      Source Midstream Urine    Urine Microscopic   Result Value Ref Range    WBC Urine 5-10 (A) 0 - 2 /HPF    RBC Urine O - 2 0 - 2 /HPF        ASSESSMENT/PLAN:                                                      Symptoms may be a case of acute cystitis. Other differentials were discussed. Urine culture is pending. Given multiple allergies we will start treatment with nitrofurantoin. May consider other treatment options if symptoms are persisting.      ICD-10-CM    1. Dysuria R30.0 *UA reflex to Microscopic and Culture ([place] and [facility] (except [place] and [place])     Urine Microscopic     Urine Culture Aerobic Bacterial   2. Nonspecific finding on examination of urine R82.90 Urine Culture Aerobic Bacterial   3. Acute cystitis with hematuria N30.01 nitrofurantoin, macrocrystal-monohydrate, (MACROBID) 100 MG capsule           MARCELINA Jackson [facility]

## 2017-05-31 ENCOUNTER — NURSE TRIAGE (OUTPATIENT)
Dept: NURSING | Facility: CLINIC | Age: 62
End: 2017-05-31

## 2017-06-01 ENCOUNTER — OFFICE VISIT (OUTPATIENT)
Dept: FAMILY MEDICINE | Facility: CLINIC | Age: 62
End: 2017-06-01
Payer: COMMERCIAL

## 2017-06-01 VITALS
SYSTOLIC BLOOD PRESSURE: 120 MMHG | BODY MASS INDEX: 24.05 KG/M2 | HEART RATE: 75 BPM | DIASTOLIC BLOOD PRESSURE: 70 MMHG | TEMPERATURE: 97.8 F | WEIGHT: 149 LBS | OXYGEN SATURATION: 98 %

## 2017-06-01 DIAGNOSIS — R10.32 LLQ ABDOMINAL PAIN: ICD-10-CM

## 2017-06-01 DIAGNOSIS — R10.11 RUQ ABDOMINAL PAIN: Primary | ICD-10-CM

## 2017-06-01 LAB
BACTERIA SPEC CULT: ABNORMAL
BASOPHILS # BLD AUTO: 0 10E9/L (ref 0–0.2)
BASOPHILS NFR BLD AUTO: 0.3 %
DIFFERENTIAL METHOD BLD: NORMAL
EOSINOPHIL # BLD AUTO: 0 10E9/L (ref 0–0.7)
EOSINOPHIL NFR BLD AUTO: 0.5 %
ERYTHROCYTE [DISTWIDTH] IN BLOOD BY AUTOMATED COUNT: 13 % (ref 10–15)
HCT VFR BLD AUTO: 42.4 % (ref 35–47)
HGB BLD-MCNC: 13.8 G/DL (ref 11.7–15.7)
LYMPHOCYTES # BLD AUTO: 1.3 10E9/L (ref 0.8–5.3)
LYMPHOCYTES NFR BLD AUTO: 23.1 %
MCH RBC QN AUTO: 31.2 PG (ref 26.5–33)
MCHC RBC AUTO-ENTMCNC: 32.5 G/DL (ref 31.5–36.5)
MCV RBC AUTO: 96 FL (ref 78–100)
MICRO REPORT STATUS: ABNORMAL
MICROORGANISM SPEC CULT: ABNORMAL
MONOCYTES # BLD AUTO: 0.5 10E9/L (ref 0–1.3)
MONOCYTES NFR BLD AUTO: 8 %
NEUTROPHILS # BLD AUTO: 3.9 10E9/L (ref 1.6–8.3)
NEUTROPHILS NFR BLD AUTO: 68.1 %
PLATELET # BLD AUTO: 259 10E9/L (ref 150–450)
RBC # BLD AUTO: 4.42 10E12/L (ref 3.8–5.2)
SPECIMEN SOURCE: ABNORMAL
WBC # BLD AUTO: 5.8 10E9/L (ref 4–11)

## 2017-06-01 PROCEDURE — 80053 COMPREHEN METABOLIC PANEL: CPT | Performed by: FAMILY MEDICINE

## 2017-06-01 PROCEDURE — 99213 OFFICE O/P EST LOW 20 MIN: CPT | Performed by: FAMILY MEDICINE

## 2017-06-01 PROCEDURE — 83690 ASSAY OF LIPASE: CPT | Performed by: FAMILY MEDICINE

## 2017-06-01 PROCEDURE — 36415 COLL VENOUS BLD VENIPUNCTURE: CPT | Performed by: FAMILY MEDICINE

## 2017-06-01 PROCEDURE — 85025 COMPLETE CBC W/AUTO DIFF WBC: CPT | Performed by: FAMILY MEDICINE

## 2017-06-01 NOTE — MR AVS SNAPSHOT
After Visit Summary   6/1/2017    Breonna Caruso    MRN: [id number]           Patient Information     Date Of Birth          1955        Visit Information        Provider Department      6/1/2017 2:30 PM Meghna Orantes MD [facility]        Today's Diagnoses     RUQ abdominal pain    -  1    LLQ abdominal pain           Follow-ups after your visit        Your next 10 appointments already scheduled     Jun 02, 2017  3:00 PM CDT   CT ABDOMEN PELVIS W/O CONTRAST with [id number] ([facility])    [address]  [facility] [address] [phone]           Please bring any scans or X-rays taken at other hospitals, if similar tests were done. Also bring a list of your medicines, including vitamins, minerals and over-the-counter drugs. It is safest to leave personal items at home.  Be sure to tell your doctor:   If you have any allergies.   If there s any chance you are pregnant.   If you are breastfeeding.   If you have any special needs.  You do not need to do anything special to prepare.  Please wear loose clothing, such as a sweat suit or jogging clothes. Avoid snaps, zippers and other metal. We may ask you to undress and put on a hospital gown.              Future tests that were ordered for you today     Open Future Orders        Priority Expected Expires Ordered    CT Abdomen Pelvis w/o Contrast Routine  6/1/2018 6/1/2017            Who to contact     If you have questions or need follow up information about today's clinic visit or your schedule please contact [facility] directly at [phone].  Normal or non-critical lab and imaging results will be communicated to you by MyChart, letter or phone within 4 business days after the clinic has received the results. If you do not hear from us within 7 days, please contact the clinic through MyChart or phone. If you have a critical or  abnormal lab result, we will notify you by phone as soon as possible.  Submit refill requests through [employer] or call your pharmacy and they will forward the refill request to us. Please allow 3 business days for your refill to be completed.          Additional Information About Your Visit        [employer] Information     [employer] gives you secure access to your electronic health record. If you see a primary care provider, you can also send messages to your care team and make appointments. If you have questions, please call your primary care clinic.  If you do not have a primary care provider, please call [phone] and they will assist you.        Care EveryWhere ID     This is your Care EveryWhere ID. This could be used by other organizations to access your [place] medical records  [id number]        Your Vitals Were     Pulse Temperature Pulse Oximetry BMI (Body Mass Index)          75 97.8  F (36.6  C) (Oral) 98% 24.05 kg/m2         Blood Pressure from Last 3 Encounters:   06/01/17 120/70   05/30/17 126/78   05/18/17 133/80    Weight from Last 3 Encounters:   06/01/17 149 lb (67.6 kg)   05/30/17 151 lb (68.5 kg)   05/18/17 149 lb (67.6 kg)              We Performed the Following     CBC with platelets and differential     Comprehensive metabolic panel (BMP + Alb, Alk Phos, ALT, AST, Total. Bili, TP)     Lipase        Primary Care Provider Office Phone # Fax #    Ramona Ann Aaseby-Aguilera, PA-C [phone]       [facility] [address] [address] [address]        Thank you!     Thank you for choosing [facility]  for your care. Our goal is always to provide you with excellent care. Hearing back from our patients is one way we can continue to improve our services. Please take a few minutes to complete the written survey that you may receive in the mail after your visit with us. Thank you!             Your Updated Medication List - Protect others around you: Learn  how to safely use, store and throw away your medicines at www.disposemymeds.org.          This list is accurate as of: 6/1/17  3:06 PM.  Always use your most recent med list.                   Brand Name Dispense Instructions for use    albuterol 108 (90 BASE) MCG/ACT Inhaler    VENTOLIN HFA    3 Inhaler    Inhale 1-2 puffs into the lungs 4 times daily as needed       beclomethasone 80 MCG/ACT Inhaler    QVAR    3 Inhaler    Inhale 2 puffs into the lungs 2 times daily with adaptor       benzonatate 200 MG capsule    TESSALON    21 capsule    Take 1 capsule (200 mg) by mouth 3 times daily as needed for cough       buPROPion 200 MG 12 hr tablet    WELLBUTRIN SR    180 tablet    Take 1 tablet (200 mg) by mouth 2 times daily       DULoxetine 20 MG EC capsule    CYMBALTA    90 capsule    Take 1 capsule (20 mg) by mouth daily       esomeprazole 20 MG CR capsule    nexIUM    30 capsule    Take 1 capsule (20 mg) by mouth every morning (before breakfast) Take 30-60 minutes before eating.       estrogens-methylTESTOSTERone 1.25-2.5 MG per tablet    ESTRATEST    90 tablet    Take 1 tablet by mouth daily       fexofenadine 180 MG tablet    ALLEGRA    90 tablet    Take 1 tablet by mouth daily.       fluticasone 50 MCG/ACT spray    FLONASE    48 g    Spray 2 sprays into both nostrils daily       lisinopril 30 MG tablet    PRINIVIL,ZESTRIL    90 tablet    Take 1 tablet (30 mg) by mouth daily       nitrofurantoin (macrocrystal-monohydrate) 100 MG capsule    MACROBID    14 capsule    Take 1 capsule (100 mg) by mouth 2 times daily       simvastatin 40 MG tablet    ZOCOR    90 tablet    Take 1 tablet (40 mg) by mouth At Bedtime       SUMAtriptan 5 MG/ACT nasal spray    IMITREX    1 Inhaler    Spray 1 spray (5 mg) in nostril as needed for migraine       tretinoin 0.05 % cream    RETIN-A    45 g    Spread a pea size amount into affected area.  Use sunscreen SPF>20.

## 2017-06-01 NOTE — NURSING NOTE
"Chief Complaint   Patient presents with     Abdominal Pain       Initial /70 (BP Location: Right arm, Patient Position: Chair, Cuff Size: Adult Regular)  Pulse 75  Temp 97.8  F (36.6  C) (Oral)  Wt 149 lb (67.6 kg)  SpO2 98%  BMI 24.05 kg/m2 Estimated body mass index is 24.05 kg/(m^2) as calculated from the following:    Height as of 5/30/17: 5' 6\" (1.676 m).    Weight as of this encounter: 149 lb (67.6 kg).  Medication Reconciliation: complete.Cristofer WADE MA      "

## 2017-06-01 NOTE — PROGRESS NOTES
SUBJECTIVE:                                                    Breonna Caruso is a 62 year old female who presents to clinic today for the following health issues:      ABDOMINAL PAIN     Onset: yesterday    Description:   Character: bloating, pain  Location: middle abdominal area  Radiation: None    Intensity: moderate    Progression of Symptoms:  same    Accompanying Signs & Symptoms:  Fever/Chills?: no   Gas/Bloating: YES- bloating  Nausea: YES  Vomitting: no   Diarrhea?: no   Constipation:no   Dysuria or Hematuria: YES- currently on treatment for uti    History:   Trauma: no   Previous similar pain: YES   Previous tests done: yes, colonoscopy, endoscopy    Precipitating factors:   Does the pain change with:     Food: no      BM: YES- slight relief    Urination: no     Alleviating factors:  none    Therapies Tried and outcome: none    LMP:  not applicable       Typical diverticulitis symptoms include LLQ abd pain, radiating to the back. Now with RUQ abd pain and bloating, improved with eating.     Soft, brown every 1-2 days BM, no changes.     Started macrobid 2 days ago for treatment of likely an early UTI.     Hx of diverticulitis twice in her life. Does not necessarily adhere to low residue diet.   No hx abd surgeries.       Problem list and histories reviewed & adjusted, as indicated.  Additional history: none    Patient Active Problem List   Diagnosis     Dyslipidemia     GERD (gastroesophageal reflux disease)     Mild persistent asthma     Hyperlipidemia LDL goal <160     Impaired fasting glucose     Acne     Migraine headache     Advanced directives, counseling/discussion     Family history of coronary artery disease     Low HDL (under 40)     Decreased libido     HTN, goal below 140/90     Major depressive disorder, recurrent episode, mild (HCC)     Past Surgical History:   Procedure Laterality Date     COLONOSCOPY N/A 7/10/2015    Procedure: COLONOSCOPY;  Surgeon: Saul Aguilar MD;  Location:  [facility]     HYSTERECTOMY VAGINAL  1994    endometriosis     REPAIR PTOSIS BILATERAL  1/4/2013    Procedure: REPAIR PTOSIS BILATERAL;  BILATERAL UPPER LID MECHANICAL PTOSIS REPAIR;  Surgeon: Derek Allred MD;  Location:  [facility]     SLING BLADDER SUSPENSION WITH FASCIA KAYLYN  2004    bladder sling      SURGICAL HISTORY OF -   2003    rt shoulder surgery        Social History   Substance Use Topics     Smoking status: Never Smoker     Smokeless tobacco: Never Used     Alcohol use Yes      Comment: some wine/beer     Family History   Problem Relation Age of Onset     Lipids Mother      HEART DISEASE Mother      pacemaker     HEART DISEASE Father      heart attacks-multiple ones     Lipids Father      diabetes after stroke      CEREBROVASCULAR DISEASE Father      HEART DISEASE Brother      3 total, one wtih open heart surgery at age of 55     DIABETES Brother      one brother with dm, other one with glucose intolerance     Breast Cancer No family hx of      Cancer - colorectal No family hx of      Colon Cancer No family hx of            Reviewed and updated as needed this visit by clinical staff  Tobacco  Allergies  Med Hx  Surg Hx  Fam Hx  Soc Hx      Reviewed and updated as needed this visit by Provider         ROS:  Constitutional, HEENT, cardiovascular, pulmonary, gi and gu systems are negative, except as otherwise noted.    OBJECTIVE:                                                    /70 (BP Location: Right arm, Patient Position: Chair, Cuff Size: Adult Regular)  Pulse 75  Temp 97.8  F (36.6  C) (Oral)  Wt 149 lb (67.6 kg)  SpO2 98%  BMI 24.05 kg/m2  Body mass index is 24.05 kg/(m^2).  GENERAL: healthy, alert and no distress  RESP: lungs clear to auscultation - no rales, rhonchi or wheezes  CV: regular rate and rhythm, normal S1 S2, no S3 or S4, no murmur  ABDOMEN: soft, mildly + Sánchez's, no hepatosplenomegaly, no masses and bowel sounds normal    Diagnostic Test Results:  none       ASSESSMENT/PLAN:                                                      1. RUQ abdominal pain - mildly + Sánchez's, new compared to previous diverticulitis infections. Will start with lab work and imaging.   - Lipase  - Comprehensive metabolic panel (BMP + Alb, Alk Phos, ALT, AST, Total. Bili, TP)    2. LLQ abdominal pain - possible diverticulitis, discussed imaging and lab work to start. If CBC elevated, will not delay treatment until imaging back.   - CT Abdomen Pelvis w/o Contrast; Future  - CBC with platelets and differential      Meghna Orantes MD  [facility]

## 2017-06-02 ENCOUNTER — HOSPITAL ENCOUNTER (OUTPATIENT)
Dept: CT IMAGING | Facility: CLINIC | Age: 62
Discharge: HOME | End: 2017-06-02
Attending: FAMILY MEDICINE | Admitting: FAMILY MEDICINE
Payer: COMMERCIAL

## 2017-06-02 DIAGNOSIS — R10.32 LLQ ABDOMINAL PAIN: ICD-10-CM

## 2017-06-02 LAB
ALBUMIN SERPL-MCNC: 4 G/DL (ref 3.4–5)
ALP SERPL-CCNC: 46 U/L (ref 40–150)
ALT SERPL W P-5'-P-CCNC: 24 U/L (ref 0–50)
ANION GAP SERPL CALCULATED.3IONS-SCNC: 11 MMOL/L (ref 3–14)
AST SERPL W P-5'-P-CCNC: 11 U/L (ref 0–45)
BILIRUB SERPL-MCNC: 0.3 MG/DL (ref 0.2–1.3)
BUN SERPL-MCNC: 12 MG/DL (ref 7–30)
CALCIUM SERPL-MCNC: 9.4 MG/DL (ref 8.5–10.1)
CHLORIDE SERPL-SCNC: 104 MMOL/L (ref 94–109)
CO2 SERPL-SCNC: 28 MMOL/L (ref 20–32)
CREAT SERPL-MCNC: 0.71 MG/DL (ref 0.52–1.04)
GFR SERPL CREATININE-BSD FRML MDRD: 83 ML/MIN/1.7M2
GLUCOSE SERPL-MCNC: 85 MG/DL (ref 70–99)
LIPASE SERPL-CCNC: 177 U/L (ref 73–393)
POTASSIUM SERPL-SCNC: 4.2 MMOL/L (ref 3.4–5.3)
PROT SERPL-MCNC: 7.4 G/DL (ref 6.8–8.8)
SODIUM SERPL-SCNC: 143 MMOL/L (ref 133–144)

## 2017-06-02 PROCEDURE — 74176 CT ABD & PELVIS W/O CONTRAST: CPT

## 2017-06-04 ENCOUNTER — TELEPHONE (OUTPATIENT)
Dept: URGENT CARE | Facility: URGENT CARE | Age: 62
End: 2017-06-04

## 2017-06-04 DIAGNOSIS — N30.01 ACUTE CYSTITIS WITH HEMATURIA: Primary | ICD-10-CM

## 2017-06-04 RX ORDER — GRANULES FOR ORAL 3 G/1
3 POWDER ORAL ONCE
Qty: 1 PACKET | Refills: 0 | Status: SHIPPED | OUTPATIENT
Start: 2017-06-04 | End: 2017-06-04

## 2017-06-04 NOTE — TELEPHONE ENCOUNTER
Discussed urine culture. Symptoms are not improved. With try some fosfomycin. Will follow up with persisting concerns        Chris Feliciano

## 2017-08-26 DIAGNOSIS — J30.9 RHINITIS, ALLERGIC: ICD-10-CM

## 2017-08-27 NOTE — TELEPHONE ENCOUNTER
fluticasone (FLONASE) 50 MCG/ACT spray       Last Written Prescription Date: 07/11/2016  Last Fill Quantity: 48g, # refills: 3    Last Office Visit with [facility], [facility] or [facility] prescribing provider:  06/01/2017   Future Office Visit:       Date of Last Asthma Action Plan Letter:   Asthma Action Plan Q1 Year    Topic Date Due     Asthma Action Plan - yearly  05/26/2016      Asthma Control Test:   ACT Total Scores 5/18/2017   ACT TOTAL SCORE -   ASTHMA ER VISITS -   ASTHMA HOSPITALIZATIONS -   ACT TOTAL SCORE (Goal Greater than or Equal to 20) 21   In the past 12 months, how many times did you visit the emergency room for your asthma without being admitted to the hospital? 0   In the past 12 months, how many times were you hospitalized overnight because of your asthma? 0       Date of Last Spirometry Test:   No results found for this or any previous visit.

## 2017-08-28 RX ORDER — FLUTICASONE PROPIONATE 50 MCG
SPRAY, SUSPENSION (ML) NASAL
Qty: 48 G | Refills: 3 | Status: SHIPPED | OUTPATIENT
Start: 2017-08-28 | End: 2018-09-04

## 2017-08-28 NOTE — TELEPHONE ENCOUNTER
Prescription approved per [facility] according to the [facility] Refill Protocol.  Poonam Taylor RN, BSN

## 2017-09-22 ENCOUNTER — TELEPHONE (OUTPATIENT)
Dept: FAMILY MEDICINE | Facility: CLINIC | Age: 62
End: 2017-09-22

## 2017-09-22 ENCOUNTER — OFFICE VISIT (OUTPATIENT)
Dept: FAMILY MEDICINE | Facility: CLINIC | Age: 62
End: 2017-09-22
Payer: COMMERCIAL

## 2017-09-22 VITALS
DIASTOLIC BLOOD PRESSURE: 70 MMHG | HEART RATE: 87 BPM | OXYGEN SATURATION: 92 % | BODY MASS INDEX: 25.52 KG/M2 | SYSTOLIC BLOOD PRESSURE: 120 MMHG | WEIGHT: 158.8 LBS | TEMPERATURE: 97.9 F | HEIGHT: 66 IN

## 2017-09-22 DIAGNOSIS — T63.444A BEE STING REACTION, UNDETERMINED INTENT, INITIAL ENCOUNTER: Primary | ICD-10-CM

## 2017-09-22 PROCEDURE — 99213 OFFICE O/P EST LOW 20 MIN: CPT | Performed by: STUDENT IN AN ORGANIZED HEALTH CARE EDUCATION/TRAINING PROGRAM

## 2017-09-22 RX ORDER — PREDNISONE 20 MG/1
20 TABLET ORAL 2 TIMES DAILY
Qty: 10 TABLET | Refills: 0 | Status: SHIPPED | OUTPATIENT
Start: 2017-09-22 | End: 2018-04-24

## 2017-09-22 RX ORDER — BACITRACIN 500 [USP'U]/G
OINTMENT OPHTHALMIC
Refills: 0 | COMMUNITY
Start: 2017-05-16 | End: 2018-04-24

## 2017-09-22 NOTE — MR AVS SNAPSHOT
"              After Visit Summary   9/22/2017    Breonna Caruso    MRN: [id number]           Patient Information     Date Of Birth          1955        Visit Information        Provider Department      9/22/2017 2:15 PM Da Newton PA-C [facility]        Today's Diagnoses     Bee sting reaction, undetermined intent, initial encounter    -  1       Follow-ups after your visit        Who to contact     If you have questions or need follow up information about today's clinic visit or your schedule please contact [facility] directly at [phone].  Normal or non-critical lab and imaging results will be communicated to you by My [employer], letter or phone within 4 business days after the clinic has received the results. If you do not hear from us within 7 days, please contact the clinic through My [employer] or phone. If you have a critical or abnormal lab result, we will notify you by phone as soon as possible.  Submit refill requests through [employer] or call your pharmacy and they will forward the refill request to us. Please allow 3 business days for your refill to be completed.          Additional Information About Your Visit        MyChart Information     [employer] gives you secure access to your electronic health record. If you see a primary care provider, you can also send messages to your care team and make appointments. If you have questions, please call your primary care clinic.  If you do not have a primary care provider, please call [phone] and they will assist you.        Care EveryWhere ID     This is your Care EveryWhere ID. This could be used by other organizations to access your [place] medical records  [id number]        Your Vitals Were     Pulse Temperature Height Pulse Oximetry Breastfeeding? BMI (Body Mass Index)    87 97.9  F (36.6  C) (Oral) 5' 6\" (1.676 m) 92% No 25.63 kg/m2       Blood Pressure from Last 3 Encounters:   09/22/17 120/70   06/01/17 " 120/70   05/30/17 126/78    Weight from Last 3 Encounters:   09/22/17 158 lb 12.8 oz (72 kg)   06/01/17 149 lb (67.6 kg)   05/30/17 151 lb (68.5 kg)              Today, you had the following     No orders found for display         Today's Medication Changes          These changes are accurate as of: 9/22/17  2:54 PM.  If you have any questions, ask your nurse or doctor.               Start taking these medicines.        Dose/Directions    predniSONE 20 MG tablet   Commonly known as:  DELTASONE   Used for:  Bee sting reaction, undetermined intent, initial encounter   Started by:  Da Newton PA-C        Dose:  20 mg   Take 1 tablet (20 mg) by mouth 2 times daily   Quantity:  10 tablet   Refills:  0            Where to get your medicines      These medications were sent to [facility]/pharmacy #[id number] - [place], [place] - [address] [address]  [address] [address] [address]    Hours:  [facility] to [facility] Phone:  [phone]     predniSONE 20 MG tablet                Primary Care Provider Office Phone # Fax #    Lula Ann Aaseby-Aguilera, PA-C [phone] [address]  [facility] [address]        Equal Access to Services     VALERIY HENDRIX AH: Hadii alejandra ku hadasho Soomaali, waaxda luqadaha, qaybta kaalmada adeegyada, waxay jose rubin. So [facility] [phone].    ATENCIÓN: Si habla español, tiene a lombardo disposición servicios gratuitos de asistencia lingüística. Llame al [phone].    We comply with applicable federal civil rights laws and [place] laws. We do not discriminate on the basis of race, color, national origin, age, disability sex, sexual orientation or gender identity.            Thank you!     Thank you for choosing [facility]  for your care. Our goal is always to provide you with excellent care. Hearing back from our patients is one way we can continue to improve our services. Please take a few minutes to complete the written  survey that you may receive in the mail after your visit with us. Thank you!             Your Updated Medication List - Protect others around you: Learn how to safely use, store and throw away your medicines at www.disposemymeds.org.          This list is accurate as of: 9/22/17  2:54 PM.  Always use your most recent med list.                   Brand Name Dispense Instructions for use Diagnosis    albuterol 108 (90 BASE) MCG/ACT Inhaler    VENTOLIN HFA    3 Inhaler    Inhale 1-2 puffs into the lungs 4 times daily as needed    Mild persistent asthma without complication       bacitracin ophthalmic ointment      APPLY TOPICALLY TO THE EYELID MARGINS EVERY EVENING FOR 1 WEEK        beclomethasone 80 MCG/ACT Inhaler    QVAR    3 Inhaler    Inhale 2 puffs into the lungs 2 times daily with adaptor    Mild persistent asthma without complication       buPROPion 200 MG 12 hr tablet    WELLBUTRIN SR    180 tablet    Take 1 tablet (200 mg) by mouth 2 times daily    Decreased libido       DULoxetine 20 MG EC capsule    CYMBALTA    90 capsule    Take 1 capsule (20 mg) by mouth daily    Major depressive disorder, recurrent episode, mild (H)       esomeprazole 20 MG CR capsule    nexIUM    30 capsule    Take 1 capsule (20 mg) by mouth every morning (before breakfast) Take 30-60 minutes before eating.    GERD (gastroesophageal reflux disease)       estrogens-methylTESTOSTERone 1.25-2.5 MG per tablet    ESTRATEST    90 tablet    Take 1 tablet by mouth daily    Decreased libido       fexofenadine 180 MG tablet    ALLEGRA    90 tablet    Take 1 tablet by mouth daily.    Allergies       fluticasone 50 MCG/ACT spray    FLONASE    48 g    SPRAY 2 SPRAYS INTO EACH NOSTRIL DAILY.    Rhinitis, allergic       lisinopril 30 MG tablet    PRINIVIL,ZESTRIL    90 tablet    Take 1 tablet (30 mg) by mouth daily    HTN, goal below 140/90       predniSONE 20 MG tablet    DELTASONE    10 tablet    Take 1 tablet (20 mg) by mouth 2 times daily    Bee  sting reaction, undetermined intent, initial encounter       SUMAtriptan 5 MG/ACT nasal spray    IMITREX    1 Inhaler    Spray 1 spray (5 mg) in nostril as needed for migraine    Intractable chronic migraine without aura and without status migrainosus       tretinoin 0.05 % cream    RETIN-A    45 g    Spread a pea size amount into affected area.  Use sunscreen SPF>20.    Acne

## 2017-09-22 NOTE — TELEPHONE ENCOUNTER
Pt calling stating she was stung under left arm last week and she got the stinger out and it seemed to get better.  She has now noticed an increase in itching and swelling/redness.  Advised she be seen to make sure it isn't infected.  Pt agrees and scheduled with float provider in [facility] clinic.    Poonam Taylor RN, BSN

## 2017-09-22 NOTE — NURSING NOTE
"Chief Complaint   Patient presents with     Insect Bites     possible infection        Initial /70 (BP Location: Right arm, Patient Position: Chair, Cuff Size: Adult Regular)  Pulse 87  Temp 97.9  F (36.6  C) (Oral)  Ht 5' 6\" (1.676 m)  Wt 158 lb 12.8 oz (72 kg)  SpO2 92%  Breastfeeding? No  BMI 25.63 kg/m2 Estimated body mass index is 25.63 kg/(m^2) as calculated from the following:    Height as of this encounter: 5' 6\" (1.676 m).    Weight as of this encounter: 158 lb 12.8 oz (72 kg).  Medication Reconciliation: complete   MATT Howard      "

## 2017-09-22 NOTE — PROGRESS NOTES
SUBJECTIVE:   Breonna Caruso is a 62 year old female who presents to clinic today for the following health issues:    61 y/o female presenting to the clinic for evaluation of a bee sting. The patient notes she was stung in the left bicep region 1 week ago. She had immediate pain and itching. These symptoms resolved completely over 2-3 days, however she notes she then developed increased redness and itching over the past 2 days. Denies pain, fever, nausea, or vomiting. Has not tried any treatment at this time. Does take an allegra daily for allergies. Denies any anaphylactic reactions or symptoms. No SOB, chest pain, nausea, or vomiting.     8 point ROS completed and negative other than in the HPI.    Past Medical History:   Diagnosis Date     Acne 3/28/2011     Decreased libido 10/14/2013     Dyslipidemia 11/15/2010     Family history of coronary artery disease 1/18/2012     GERD (gastroesophageal reflux disease) 11/15/2010     Gestational diabetes 4/22/2011     HTN, goal below 140/90 9/29/2014     Hyperlipidemia LDL goal <160 2/23/2011     Impaired fasting glucose 3/28/2011     Low HDL (under 40) 1/28/2013     Migraine headache 4/22/2011     (Problem list name updated by automated process. Provider to review and confirm.)     Mild major depression (H) 12/22/2010     Mild persistent asthma 2/23/2011     Symptomatic states associated with artificial menopause 1/18/2012     Past Surgical History:   Procedure Laterality Date     COLONOSCOPY N/A 7/10/2015    Procedure: COLONOSCOPY;  Surgeon: Saul Aguilar MD;  Location:  GI     HYSTERECTOMY VAGINAL  1994    endometriosis     REPAIR PTOSIS BILATERAL  1/4/2013    Procedure: REPAIR PTOSIS BILATERAL;  BILATERAL UPPER LID MECHANICAL PTOSIS REPAIR;  Surgeon: Derek Allred MD;  Location:  EC     SLING BLADDER SUSPENSION WITH FASCIA KAYLYN  2004    bladder sling      SURGICAL HISTORY OF -   2003    rt shoulder surgery      Social History     Social History  "    Marital status:      Spouse name: N/A     Number of children: N/A     Years of education: N/A     Occupational History     Not on file.     Social History Main Topics     Smoking status: Never Smoker     Smokeless tobacco: Never Used     Alcohol use Yes      Comment: some wine/beer     Drug use: No     Sexual activity: Yes     Partners: Male     Birth control/ protection: Surgical      Comment: complete hysterectomy     Other Topics Concern     Parent/Sibling W/ Cabg, Mi Or Angioplasty Before 65f 55m? No     Social History Narrative     since 2007-going well    Working FT,HR and administrative work    32 yo son and 26 yo daughter    2 grandkids    2 dogs    Mother lives with pt since 2009     Family History   Problem Relation Age of Onset     Lipids Mother      HEART DISEASE Mother      pacemaker     HEART DISEASE Father      heart attacks-multiple ones     Lipids Father      diabetes after stroke      CEREBROVASCULAR DISEASE Father      HEART DISEASE Brother      3 total, one wtih open heart surgery at age of 55     DIABETES Brother      one brother with dm, other one with glucose intolerance     Breast Cancer No family hx of      Cancer - colorectal No family hx of      Colon Cancer No family hx of        Objective:  /70 (BP Location: Right arm, Patient Position: Chair, Cuff Size: Adult Regular)  Pulse 87  Temp 97.9  F (36.6  C) (Oral)  Ht 5' 6\" (1.676 m)  Wt 158 lb 12.8 oz (72 kg)  SpO2 92%  Breastfeeding? No  BMI 25.63 kg/m2  Physical Exam   Constitutional: She is well-developed, well-nourished, and in no distress. No distress.   HENT:   Head: Normocephalic and atraumatic.   Eyes: Conjunctivae are normal. Pupils are equal, round, and reactive to light.   Neck: Normal range of motion.   Cardiovascular: Normal rate, regular rhythm and normal heart sounds.    Skin: She is not diaphoretic.          Breonna was seen today for insect bites.    Diagnoses and all orders for this " visit:    Bee sting reaction, undetermined intent, initial encounter  -     predniSONE (DELTASONE) 20 MG tablet; Take 1 tablet (20 mg) by mouth 2 times daily      Patient's presentation and physical exam is most consistent with a bee sting. Her initial sting was approx. 7 days ago. Symptoms resolved, however there appears to be rebound symptoms. No signs or symptoms consistent with cellulitis or an anaphylactic reaction. The patient is being started on a 5 day burst of prednisone. She will also continue taking allegra daily. She will add Benadryl at night to help with itching. Ice 2-3 times a day for 10-15 minutes. Educated on red-flags to return to the clinic and reasons to be seen in the ED.    Da Newton PA-C

## 2017-11-30 DIAGNOSIS — R68.82 DECREASED LIBIDO: ICD-10-CM

## 2017-11-30 NOTE — TELEPHONE ENCOUNTER
Est estrogens-methylt      Last Written Prescription Date:  5/9/17  Last Fill Quantity: 90,   # refills: 2  Last Office Visit: 10/14/13  Future Office visit:

## 2017-12-01 RX ORDER — ESTERIFIED ESTROGEN AND METHYLTESTOSTERONE 1.25; 2.5 MG/1; MG/1
1 TABLET ORAL DAILY
Qty: 90 TABLET | Refills: 2 | Status: SHIPPED | OUTPATIENT
Start: 2017-12-01 | End: 2018-03-13

## 2017-12-01 NOTE — TELEPHONE ENCOUNTER
Pt calling to check status of refill. States she is out of medication and would like it refilled ASAP please.    Efra Vega   12/01/17 11:28 AM

## 2018-03-13 DIAGNOSIS — R68.82 DECREASED LIBIDO: ICD-10-CM

## 2018-03-13 RX ORDER — ESTERIFIED ESTROGEN AND METHYLTESTOSTERONE 1.25; 2.5 MG/1; MG/1
1 TABLET ORAL DAILY
Qty: 90 TABLET | Refills: 2 | Status: SHIPPED | OUTPATIENT
Start: 2018-03-13 | End: 2018-04-24

## 2018-03-13 NOTE — TELEPHONE ENCOUNTER
Change in mail order pharmacy.  Rn can't sign as this is a controlled substance  Poonam Taylor RN, BSN

## 2018-03-13 NOTE — TELEPHONE ENCOUNTER
Requested Prescriptions   Pending Prescriptions Disp Refills     estrogens-methylTESTOSTERone (ESTRATEST) 1.25-2.5 MG per tablet 90 tablet 2     Sig: Take 1 tablet by mouth daily    There is no refill protocol information for this order   Last Written Prescription Date:  12/01/2017  Last Fill Quantity: 90 tabs,  # refills: 2   Last office visit: 9/22/2017 with prescribing provider:  06/01/2017   Future Office Visit:         New pharmacy    Soraya Mosquera

## 2018-03-13 NOTE — TELEPHONE ENCOUNTER
Rx approved, fax to the [facility], Pharmacy will notified patient when prescription is ready to be picked up.   Kalpana Alexander

## 2018-04-24 ENCOUNTER — OFFICE VISIT (OUTPATIENT)
Dept: FAMILY MEDICINE | Facility: CLINIC | Age: 63
End: 2018-04-24
Payer: COMMERCIAL

## 2018-04-24 VITALS
HEIGHT: 66 IN | SYSTOLIC BLOOD PRESSURE: 120 MMHG | DIASTOLIC BLOOD PRESSURE: 80 MMHG | HEART RATE: 79 BPM | OXYGEN SATURATION: 94 % | TEMPERATURE: 98.3 F | WEIGHT: 153.1 LBS | BODY MASS INDEX: 24.6 KG/M2

## 2018-04-24 DIAGNOSIS — R68.82 DECREASED LIBIDO: ICD-10-CM

## 2018-04-24 DIAGNOSIS — Z13.0 SCREENING FOR DISORDER OF BLOOD AND BLOOD-FORMING ORGANS: ICD-10-CM

## 2018-04-24 DIAGNOSIS — G47.00 PERSISTENT INSOMNIA: ICD-10-CM

## 2018-04-24 DIAGNOSIS — Z00.00 ROUTINE GENERAL MEDICAL EXAMINATION AT A HEALTH CARE FACILITY: Primary | ICD-10-CM

## 2018-04-24 DIAGNOSIS — R06.83 SNORING: ICD-10-CM

## 2018-04-24 DIAGNOSIS — Z12.31 ENCOUNTER FOR SCREENING MAMMOGRAM FOR BREAST CANCER: ICD-10-CM

## 2018-04-24 DIAGNOSIS — I10 HTN, GOAL BELOW 140/90: ICD-10-CM

## 2018-04-24 DIAGNOSIS — Z13.29 SCREENING FOR THYROID DISORDER: ICD-10-CM

## 2018-04-24 DIAGNOSIS — Z12.31 VISIT FOR SCREENING MAMMOGRAM: ICD-10-CM

## 2018-04-24 DIAGNOSIS — Z13.220 LIPID SCREENING: ICD-10-CM

## 2018-04-24 DIAGNOSIS — F33.0 MAJOR DEPRESSIVE DISORDER, RECURRENT EPISODE, MILD (H): ICD-10-CM

## 2018-04-24 DIAGNOSIS — Z13.1 SCREENING FOR DIABETES MELLITUS: ICD-10-CM

## 2018-04-24 DIAGNOSIS — E78.5 HYPERLIPIDEMIA LDL GOAL <160: ICD-10-CM

## 2018-04-24 LAB
ERYTHROCYTE [DISTWIDTH] IN BLOOD BY AUTOMATED COUNT: 13.1 % (ref 10–15)
HCT VFR BLD AUTO: 44.1 % (ref 35–47)
HGB BLD-MCNC: 14.2 G/DL (ref 11.7–15.7)
MCH RBC QN AUTO: 30.7 PG (ref 26.5–33)
MCHC RBC AUTO-ENTMCNC: 32.2 G/DL (ref 31.5–36.5)
MCV RBC AUTO: 95 FL (ref 78–100)
PLATELET # BLD AUTO: 253 10E9/L (ref 150–450)
RBC # BLD AUTO: 4.63 10E12/L (ref 3.8–5.2)
WBC # BLD AUTO: 6.5 10E9/L (ref 4–11)

## 2018-04-24 PROCEDURE — 84443 ASSAY THYROID STIM HORMONE: CPT | Performed by: PHYSICIAN ASSISTANT

## 2018-04-24 PROCEDURE — 99396 PREV VISIT EST AGE 40-64: CPT | Performed by: PHYSICIAN ASSISTANT

## 2018-04-24 PROCEDURE — 80061 LIPID PANEL: CPT | Performed by: PHYSICIAN ASSISTANT

## 2018-04-24 PROCEDURE — 80053 COMPREHEN METABOLIC PANEL: CPT | Performed by: PHYSICIAN ASSISTANT

## 2018-04-24 PROCEDURE — 36415 COLL VENOUS BLD VENIPUNCTURE: CPT | Performed by: PHYSICIAN ASSISTANT

## 2018-04-24 PROCEDURE — 85027 COMPLETE CBC AUTOMATED: CPT | Performed by: PHYSICIAN ASSISTANT

## 2018-04-24 RX ORDER — TRAZODONE HYDROCHLORIDE 50 MG/1
50-100 TABLET, FILM COATED ORAL
Qty: 180 TABLET | Refills: 1 | Status: SHIPPED | OUTPATIENT
Start: 2018-04-24 | End: 2019-06-20

## 2018-04-24 RX ORDER — BUPROPION HYDROCHLORIDE 200 MG/1
200 TABLET, EXTENDED RELEASE ORAL 2 TIMES DAILY
Qty: 180 TABLET | Refills: 3 | Status: SHIPPED | OUTPATIENT
Start: 2018-04-24 | End: 2019-05-29

## 2018-04-24 RX ORDER — LISINOPRIL 30 MG/1
30 TABLET ORAL DAILY
Qty: 90 TABLET | Refills: 3 | Status: SHIPPED | OUTPATIENT
Start: 2018-04-24 | End: 2018-09-04

## 2018-04-24 RX ORDER — DULOXETIN HYDROCHLORIDE 20 MG/1
20 CAPSULE, DELAYED RELEASE ORAL DAILY
Qty: 90 CAPSULE | Refills: 3 | Status: SHIPPED | OUTPATIENT
Start: 2018-04-24 | End: 2018-09-04

## 2018-04-24 RX ORDER — ESTERIFIED ESTROGEN AND METHYLTESTOSTERONE 1.25; 2.5 MG/1; MG/1
1 TABLET ORAL DAILY
Qty: 90 TABLET | Refills: 3 | Status: SHIPPED | OUTPATIENT
Start: 2018-04-24 | End: 2018-06-12

## 2018-04-24 RX ORDER — SIMVASTATIN 40 MG
40 TABLET ORAL AT BEDTIME
Qty: 90 TABLET | Refills: 3 | Status: SHIPPED | OUTPATIENT
Start: 2018-04-24 | End: 2018-05-03

## 2018-04-24 NOTE — LETTER
April 24, 2018      Breonna Caruso  [id number] [facility] [address]        To Whom It May Concern,      Breonna is under my care and seen in clinic today.  Please excuse her absence until April 26th           Sincerely,        Ramona Ann Aaseby-Aguilera, PA-C

## 2018-04-24 NOTE — PROGRESS NOTES
SUBJECTIVE:   CC: Breonna Caruso is an 63 year old woman who presents for preventive health visit.     Physical   Annual:     Getting at least 3 servings of Calcium per day::  Yes    Bi-annual eye exam::  Yes    Dental care twice a year::  Yes    Sleep apnea or symptoms of sleep apnea::  Daytime drowsiness and Excessive snoring    Diet::  Regular (no restrictions)    Frequency of exercise::  None    Taking medications regularly::  Yes    Medication side effects::  Not applicable                    Today's PHQ-2 Score:   PHQ-2 ( 1999 Pfizer) 4/24/2018   Q1: Little interest or pleasure in doing things 0   Q2: Feeling down, depressed or hopeless 0   PHQ-2 Score 0   Q1: Little interest or pleasure in doing things Not at all   Q2: Feeling down, depressed or hopeless Not at all   PHQ-2 Score 0       Abuse: Current or Past(Physical, Sexual or Emotional)- No  Do you feel safe in your environment - Yes    Social History   Substance Use Topics     Smoking status: Never Smoker     Smokeless tobacco: Never Used     Alcohol use Yes      Comment: some wine/beer     Alcohol Use 4/24/2018   If you drink alcohol do you typically have greater than 3 drinks per day OR greater than 7 drinks per week? No       Reviewed orders with patient.  Reviewed health maintenance and updated orders accordingly - Yes      Patient over age 50, mutual decision to screen reflected in health maintenance.    Pertinent mammograms are reviewed under the imaging tab.  History of abnormal Pap smear: Status post benign hysterectomy. Health Maintenance and Surgical History updated.    Reviewed and updated as needed this visit by clinical staff         Reviewed and updated as needed this visit by Provider            Review of Systems  CONSTITUTIONAL: NEGATIVE for fever, chills, change in weight  INTEGUMENTARY/SKIN: NEGATIVE for worrisome rashes, moles or lesions  EYES: NEGATIVE for vision changes or irritation  ENT: NEGATIVE for ear, mouth and throat  problems  RESP: NEGATIVE for significant cough or SOB  BREAST: NEGATIVE for masses, tenderness or discharge  CV: NEGATIVE for chest pain, palpitations or peripheral edema  GI: NEGATIVE for nausea, abdominal pain, heartburn, or change in bowel habits  : NEGATIVE for unusual urinary or vaginal symptoms. No vaginal bleeding.  MUSCULOSKELETAL: NEGATIVE for significant arthralgias or myalgia  NEURO: NEGATIVE for weakness, dizziness or paresthesias  PSYCHIATRIC: NEGATIVE for changes in mood or affect      OBJECTIVE:   There were no vitals taken for this visit.  Physical Exam  GENERAL APPEARANCE: healthy, alert and no distress  EYES: Eyes grossly normal to inspection, PERRL and conjunctivae and sclerae normal  HENT: ear canals and TM's normal, nose and mouth without ulcers or lesions, oropharynx clear and oral mucous membranes moist  NECK: no adenopathy, no asymmetry, masses, or scars and thyroid normal to palpation  RESP: lungs clear to auscultation - no rales, rhonchi or wheezes  BREAST: normal without masses, tenderness or nipple discharge and no palpable axillary masses or adenopathy  CV: regular rate and rhythm, normal S1 S2, no S3 or S4, no murmur, click or rub, no peripheral edema and peripheral pulses strong  ABDOMEN: soft, nontender, no hepatosplenomegaly, no masses and bowel sounds normal  MS: no musculoskeletal defects are noted and gait is age appropriate without ataxia  SKIN: no suspicious lesions or rashes  NEURO: Normal strength and tone, sensory exam grossly normal, mentation intact and speech normal  PSYCH: mentation appears normal and affect normal/bright    ASSESSMENT/PLAN:   1. Routine general medical examination at a health care facility      2. Visit for screening mammogram    - *MA Screening Digital Bilateral; Future    3. Persistent insomnia    - SLEEP EVALUATION & MANAGEMENT REFERRAL - ADULT -[facility]  [phone] (Age 18 and up); Future  - traZODone (DESYREL) 50 MG  tablet; Take 1-2 tablets ( mg) by mouth nightly as needed for sleep  Dispense: 180 tablet; Refill: 1    4. Snoring    - SLEEP EVALUATION & MANAGEMENT REFERRAL - ADULT -[facility] [place]  [phone] (Age 18 and up); Future  - traZODone (DESYREL) 50 MG tablet; Take 1-2 tablets ( mg) by mouth nightly as needed for sleep  Dispense: 180 tablet; Refill: 1    5. Screening for diabetes mellitus    - Comprehensive metabolic panel    6. Screening for thyroid disorder    - TSH with free T4 reflex    7. Lipid screening    - Lipid panel reflex to direct LDL Fasting    8. Screening for disorder of blood and blood-forming organs    - CBC with platelets    9. Decreased libido    - buPROPion (WELLBUTRIN SR) 200 MG 12 hr tablet; Take 1 tablet (200 mg) by mouth 2 times daily  Dispense: 180 tablet; Refill: 3  - estrogens-methylTESTOSTERone (ESTRATEST) 1.25-2.5 MG per tablet; Take 1 tablet by mouth daily  Dispense: 90 tablet; Refill: 3    10. Major depressive disorder, recurrent episode, mild (H)  Stable   - DULoxetine (CYMBALTA) 20 MG EC capsule; Take 1 capsule (20 mg) by mouth daily  Dispense: 90 capsule; Refill: 3    11. HTN, goal below 140/90    - lisinopril (PRINIVIL,ZESTRIL) 30 MG tablet; Take 1 tablet (30 mg) by mouth daily  Dispense: 90 tablet; Refill: 3    12. Hyperlipidemia LDL goal <160    - simvastatin (ZOCOR) 40 MG tablet; Take 1 tablet (40 mg) by mouth At Bedtime  Dispense: 90 tablet; Refill: 3    13. Encounter for screening mammogram for breast cancer    - *MA Screening Digital Bilateral; Future    COUNSELING:  Reviewed preventive health counseling, as reflected in patient instructions       Regular exercise       Healthy diet/nutrition       Vision screening       Hearing screening       Aspirin Prophylaxsis         reports that she has never smoked. She has never used smokeless tobacco.    Estimated body mass index is 25.63 kg/(m^2) as calculated from the following:    Height as of  "9/22/17: 5' 6\" (1.676 m).    Weight as of 9/22/17: 158 lb 12.8 oz (72 kg).       Counseling Resources:  ATP IV Guidelines  Pooled Cohorts Equation Calculator  Breast Cancer Risk Calculator  FRAX Risk Assessment  ICSI Preventive Guidelines  Dietary Guidelines for Americans, 2010  USDA's MyPlate  ASA Prophylaxis  Lung CA Screening    Ramona Ann Aaseby-Aguilera, PA-C  [facility]  Answers for HPI/ROS submitted by the patient on 4/24/2018   PHQ-2 Score: 0    "

## 2018-04-24 NOTE — MR AVS SNAPSHOT
After Visit Summary   4/24/2018    Breonna Caruso    MRN: [id number]           Patient Information     Date Of Birth          1955        Visit Information        Provider Department      4/24/2018 8:15 AM Aaseby-Aguilera, Ramona Ann, PA-C [facility]        Today's Diagnoses     Routine general medical examination at a health care facility    -  1    Visit for screening mammogram        Persistent insomnia        Snoring        Screening for diabetes mellitus        Screening for thyroid disorder        Lipid screening        Screening for disorder of blood and blood-forming organs        Decreased libido        Major depressive disorder, recurrent episode, mild (H)        HTN, goal below 140/90        Hyperlipidemia LDL goal <160        Encounter for screening mammogram for breast cancer          Care Instructions      Preventive Health Recommendations  Female Ages 50 - 64    Yearly exam: See your health care provider every year in order to  o Review health changes.   o Discuss preventive care.    o Review your medicines if your doctor has prescribed any.      Get a Pap test every three years (unless you have an abnormal result and your provider advises testing more often).    If you get Pap tests with HPV test, you only need to test every 5 years, unless you have an abnormal result.     You do not need a Pap test if your uterus was removed (hysterectomy) and you have not had cancer.    You should be tested each year for STDs (sexually transmitted diseases) if you're at risk.     Have a mammogram every 1 to 2 years.    Have a colonoscopy at age 50, or have a yearly FIT test (stool test). These exams screen for colon cancer.      Have a cholesterol test every 5 years, or more often if advised.    Have a diabetes test (fasting glucose) every three years. If you are at risk for diabetes, you should have this test more often.     If you are at risk for osteoporosis (brittle bone  disease), think about having a bone density scan (DEXA).    Shots: Get a flu shot each year. Get a tetanus shot every 10 years.    Nutrition:     Eat at least 5 servings of fruits and vegetables each day.    Eat whole-grain bread, whole-wheat pasta and brown rice instead of white grains and rice.    Talk to your provider about Calcium and Vitamin D.     Lifestyle    Exercise at least 150 minutes a week (30 minutes a day, 5 days a week). This will help you control your weight and prevent disease.    Limit alcohol to one drink per day.    No smoking.     Wear sunscreen to prevent skin cancer.     See your dentist every six months for an exam and cleaning.    See your eye doctor every 1 to 2 years.            Follow-ups after your visit        Additional Services     SLEEP EVALUATION & MANAGEMENT REFERRAL - [facility]  [phone] (Age 18 and up)       Please be aware that coverage of these services is subject to the terms and limitations of your health insurance plan.  Call member services at your health plan with any benefit or coverage questions.      Please bring the following to your appointment:    >>   List of current medications   >>   This referral request   >>   Any documents/labs given to you for this referral                      Future tests that were ordered for you today     Open Future Orders        Priority Expected Expires Ordered    *MA Screening Digital Bilateral Routine  4/24/2019 4/24/2018    SLEEP EVALUATION & MANAGEMENT REFERRAL - [facility]  [phone] (Age 18 and up) Routine  4/24/2019 4/24/2018    *MA Screening Digital Bilateral Routine  4/24/2019 4/24/2018            Who to contact     If you have questions or need follow up information about today's clinic visit or your schedule please contact [facility] directly at [phone].  Normal or non-critical lab and imaging results will be communicated to you by  "MyChart, letter or phone within 4 business days after the clinic has received the results. If you do not hear from us within 7 days, please contact the clinic through Lineat or phone. If you have a critical or abnormal lab result, we will notify you by phone as soon as possible.  Submit refill requests through [employer] or call your pharmacy and they will forward the refill request to us. Please allow 3 business days for your refill to be completed.          Additional Information About Your Visit        [employer] Information     [employer] gives you secure access to your electronic health record. If you see a primary care provider, you can also send messages to your care team and make appointments. If you have questions, please call your primary care clinic.  If you do not have a primary care provider, please call [phone] and they will assist you.        Care EveryWhere ID     This is your Care EveryWhere ID. This could be used by other organizations to access your [place] medical records  [id number]        Your Vitals Were     Pulse Temperature Height Pulse Oximetry BMI (Body Mass Index)       79 98.3  F (36.8  C) (Oral) 5' 5.5\" (1.664 m) 94% 25.09 kg/m2        Blood Pressure from Last 3 Encounters:   04/24/18 120/80   09/22/17 120/70   06/01/17 120/70    Weight from Last 3 Encounters:   04/24/18 153 lb 1.6 oz (69.4 kg)   09/22/17 158 lb 12.8 oz (72 kg)   06/01/17 149 lb (67.6 kg)              We Performed the Following     CBC with platelets     Comprehensive metabolic panel     Lipid panel reflex to direct LDL Fasting     TSH with free T4 reflex          Today's Medication Changes          These changes are accurate as of 4/24/18  9:04 AM.  If you have any questions, ask your nurse or doctor.               Start taking these medicines.        Dose/Directions    traZODone 50 MG tablet   Commonly known as:  DESYREL   Used for:  Persistent insomnia, Snoring   Started by:  Aaseby-Aguilera, Ramona Ann, PA-C        " Dose:   mg   Take 1-2 tablets ( mg) by mouth nightly as needed for sleep   Quantity:  180 tablet   Refills:  1         These medicines have changed or have updated prescriptions.        Dose/Directions    * SIMVASTATIN PO   This may have changed:  Another medication with the same name was added. Make sure you understand how and when to take each.   Changed by:  Aaseby-Aguilera, Ramona Ann, PA-C        Refills:  0       * simvastatin 40 MG tablet   Commonly known as:  ZOCOR   This may have changed:  You were already taking a medication with the same name, and this prescription was added. Make sure you understand how and when to take each.   Used for:  Hyperlipidemia LDL goal <160   Changed by:  Aaseby-Aguilera, Ramona Ann, PA-C        Dose:  40 mg   Take 1 tablet (40 mg) by mouth At Bedtime   Quantity:  90 tablet   Refills:  3       * Notice:  This list has 2 medication(s) that are the same as other medications prescribed for you. Read the directions carefully, and ask your doctor or other care provider to review them with you.      Stop taking these medicines if you haven't already. Please contact your care team if you have questions.     predniSONE 20 MG tablet   Commonly known as:  DELTASONE   Stopped by:  Aaseby-Aguilera, Ramona Ann, PA-C                Where to get your medicines      These medications were sent to [facility]/pharmacy #[id number] - [place], [place] - [address] [address]  [address] [facility] [address]    Hours:  [facility] Phone:  [phone]     buPROPion 200 MG 12 hr tablet    DULoxetine 20 MG EC capsule    lisinopril 30 MG tablet    simvastatin 40 MG tablet    traZODone 50 MG tablet         Some of these will need a paper prescription and others can be bought over the counter.  Ask your nurse if you have questions.     Bring a paper prescription for each of these medications     estrogens-methylTESTOSTERone 1.25-2.5 MG per tablet                Primary Care Provider  Office Phone # Fax #    Lula Ann Aaseby-Aguilera, PA-C [phone] [address]  [facility] [address]        Equal Access to Services     VALERIY HENDRIX : Hadii alejandra ku hadkendrao Soomaali, waaxda luqadaha, qaybta kaalmada adeegyada, leslie chambersn orlando blas briseyda rubin. So [facility] [phone].    ATENCIÓN: Si habla español, tiene a lombardo disposición servicios gratuitos de asistencia lingüística. Llame al [phone].    We comply with applicable federal civil rights laws and [place] laws. We do not discriminate on the basis of race, color, national origin, age, disability, sex, sexual orientation, or gender identity.            Thank you!     Thank you for choosing [facility]  for your care. Our goal is always to provide you with excellent care. Hearing back from our patients is one way we can continue to improve our services. Please take a few minutes to complete the written survey that you may receive in the mail after your visit with us. Thank you!             Your Updated Medication List - Protect others around you: Learn how to safely use, store and throw away your medicines at www.disposemymeds.org.          This list is accurate as of 4/24/18  9:04 AM.  Always use your most recent med list.                   Brand Name Dispense Instructions for use Diagnosis    albuterol 108 (90 Base) MCG/ACT Inhaler    VENTOLIN HFA    3 Inhaler    Inhale 1-2 puffs into the lungs 4 times daily as needed    Mild persistent asthma without complication       beclomethasone 80 MCG/ACT Inhaler    QVAR    3 Inhaler    Inhale 2 puffs into the lungs 2 times daily with adaptor    Mild persistent asthma without complication       buPROPion 200 MG 12 hr tablet    WELLBUTRIN SR    180 tablet    Take 1 tablet (200 mg) by mouth 2 times daily    Decreased libido       DULoxetine 20 MG EC capsule    CYMBALTA    90 capsule    Take 1 capsule (20 mg) by mouth daily    Major depressive disorder, recurrent  episode, mild (H)       esomeprazole 20 MG CR capsule    nexIUM    30 capsule    Take 1 capsule (20 mg) by mouth every morning (before breakfast) Take 30-60 minutes before eating.    GERD (gastroesophageal reflux disease)       estrogens-methylTESTOSTERone 1.25-2.5 MG per tablet    ESTRATEST    90 tablet    Take 1 tablet by mouth daily    Decreased libido       fexofenadine 180 MG tablet    ALLEGRA    90 tablet    Take 1 tablet by mouth daily.    Allergies       fluticasone 50 MCG/ACT spray    FLONASE    48 g    SPRAY 2 SPRAYS INTO EACH NOSTRIL DAILY.    Rhinitis, allergic       lisinopril 30 MG tablet    PRINIVIL,ZESTRIL    90 tablet    Take 1 tablet (30 mg) by mouth daily    HTN, goal below 140/90       * SIMVASTATIN PO           * simvastatin 40 MG tablet    ZOCOR    90 tablet    Take 1 tablet (40 mg) by mouth At Bedtime    Hyperlipidemia LDL goal <160       SUMAtriptan 5 MG/ACT nasal spray    IMITREX    1 Inhaler    Spray 1 spray (5 mg) in nostril as needed for migraine    Intractable chronic migraine without aura and without status migrainosus       traZODone 50 MG tablet    DESYREL    180 tablet    Take 1-2 tablets ( mg) by mouth nightly as needed for sleep    Persistent insomnia, Snoring       tretinoin 0.05 % cream    RETIN-A    45 g    Spread a pea size amount into affected area.  Use sunscreen SPF>20.    Acne       * Notice:  This list has 2 medication(s) that are the same as other medications prescribed for you. Read the directions carefully, and ask your doctor or other care provider to review them with you.

## 2018-04-24 NOTE — NURSING NOTE
"Chief Complaint   Patient presents with     Physical     fasting       Initial /80 (BP Location: Right arm, Patient Position: Chair, Cuff Size: Adult Regular)  Pulse 79  Temp 98.3  F (36.8  C) (Oral)  Ht 5' 5.5\" (1.664 m)  Wt 153 lb 1.6 oz (69.4 kg)  SpO2 94%  BMI 25.09 kg/m2 Estimated body mass index is 25.09 kg/(m^2) as calculated from the following:    Height as of this encounter: 5' 5.5\" (1.664 m).    Weight as of this encounter: 153 lb 1.6 oz (69.4 kg).  Medication Reconciliation: complete      Health Maintenance addressed:  Mammogram, PHQ9 and ACT    n/a      "

## 2018-04-25 ENCOUNTER — HOSPITAL ENCOUNTER (OUTPATIENT)
Dept: MAMMOGRAPHY | Facility: CLINIC | Age: 63
Discharge: HOME | End: 2018-04-25
Attending: PHYSICIAN ASSISTANT | Admitting: PHYSICIAN ASSISTANT
Payer: COMMERCIAL

## 2018-04-25 DIAGNOSIS — Z12.31 ENCOUNTER FOR SCREENING MAMMOGRAM FOR BREAST CANCER: ICD-10-CM

## 2018-04-25 LAB
ALBUMIN SERPL-MCNC: 4.1 G/DL (ref 3.4–5)
ALP SERPL-CCNC: 43 U/L (ref 40–150)
ALT SERPL W P-5'-P-CCNC: 29 U/L (ref 0–50)
ANION GAP SERPL CALCULATED.3IONS-SCNC: 6 MMOL/L (ref 3–14)
AST SERPL W P-5'-P-CCNC: 13 U/L (ref 0–45)
BILIRUB SERPL-MCNC: 0.4 MG/DL (ref 0.2–1.3)
BUN SERPL-MCNC: 11 MG/DL (ref 7–30)
CALCIUM SERPL-MCNC: 8.9 MG/DL (ref 8.5–10.1)
CHLORIDE SERPL-SCNC: 106 MMOL/L (ref 94–109)
CHOLEST SERPL-MCNC: 144 MG/DL
CO2 SERPL-SCNC: 30 MMOL/L (ref 20–32)
CREAT SERPL-MCNC: 0.63 MG/DL (ref 0.52–1.04)
GFR SERPL CREATININE-BSD FRML MDRD: >90 ML/MIN/1.7M2
GLUCOSE SERPL-MCNC: 92 MG/DL (ref 70–99)
HDLC SERPL-MCNC: 38 MG/DL
LDLC SERPL CALC-MCNC: 89 MG/DL
NONHDLC SERPL-MCNC: 106 MG/DL
POTASSIUM SERPL-SCNC: 4.2 MMOL/L (ref 3.4–5.3)
PROT SERPL-MCNC: 7.1 G/DL (ref 6.8–8.8)
SODIUM SERPL-SCNC: 142 MMOL/L (ref 133–144)
TRIGL SERPL-MCNC: 84 MG/DL
TSH SERPL DL<=0.005 MIU/L-ACNC: 1.14 MU/L (ref 0.4–4)

## 2018-04-25 PROCEDURE — 77067 SCR MAMMO BI INCL CAD: CPT

## 2018-04-25 ASSESSMENT — ASTHMA QUESTIONNAIRES: ACT_TOTALSCORE: 25

## 2018-04-25 ASSESSMENT — PATIENT HEALTH QUESTIONNAIRE - PHQ9: SUM OF ALL RESPONSES TO PHQ QUESTIONS 1-9: 6

## 2018-04-29 ENCOUNTER — TRANSFERRED RECORDS (OUTPATIENT)
Dept: HEALTH INFORMATION MANAGEMENT | Facility: CLINIC | Age: 63
End: 2018-04-29

## 2018-05-03 DIAGNOSIS — E78.5 HYPERLIPIDEMIA LDL GOAL <160: ICD-10-CM

## 2018-05-03 RX ORDER — SIMVASTATIN 40 MG
TABLET ORAL
Qty: 90 TABLET | Refills: 3 | Status: SHIPPED | OUTPATIENT
Start: 2018-05-03 | End: 2019-08-06

## 2018-05-10 ENCOUNTER — OFFICE VISIT (OUTPATIENT)
Dept: FAMILY MEDICINE | Facility: CLINIC | Age: 63
End: 2018-05-10
Payer: COMMERCIAL

## 2018-05-10 VITALS
TEMPERATURE: 98 F | SYSTOLIC BLOOD PRESSURE: 123 MMHG | HEIGHT: 66 IN | HEART RATE: 82 BPM | OXYGEN SATURATION: 94 % | BODY MASS INDEX: 24.65 KG/M2 | DIASTOLIC BLOOD PRESSURE: 83 MMHG | WEIGHT: 153.4 LBS

## 2018-05-10 DIAGNOSIS — J06.9 VIRAL URI: ICD-10-CM

## 2018-05-10 DIAGNOSIS — R07.0 THROAT PAIN: Primary | ICD-10-CM

## 2018-05-10 LAB
DEPRECATED S PYO AG THROAT QL EIA: NORMAL
SPECIMEN SOURCE: NORMAL

## 2018-05-10 PROCEDURE — 87081 CULTURE SCREEN ONLY: CPT | Performed by: PHYSICIAN ASSISTANT

## 2018-05-10 PROCEDURE — 99213 OFFICE O/P EST LOW 20 MIN: CPT | Performed by: PHYSICIAN ASSISTANT

## 2018-05-10 PROCEDURE — 87880 STREP A ASSAY W/OPTIC: CPT | Performed by: PHYSICIAN ASSISTANT

## 2018-05-10 NOTE — PROGRESS NOTES
SUBJECTIVE:   Breonna Caruso is a 63 year old female who presents to clinic today for the following health issues:      Acute Illness   Acute illness concerns: sore throat  Onset: yesterday     Fever: YES    Chills/Sweats: YES    Headache (location?): no    Sinus Pressure:no    Conjunctivitis:  watery    Ear Pain: no    Rhinorrhea: no     Congestion: no     Sore Throat: YES     Cough: YES-non-productive    Wheeze: YES    Decreased Appetite: YES    Nausea: YES    Vomiting: no     Diarrhea:  YES    Dysuria/Freq.: no     Fatigue/Achiness: no     Sick/Strep Exposure: YES     Therapies Tried and outcome:           Problem list and histories reviewed & adjusted, as indicated.  Additional history: as documented    Current Outpatient Prescriptions   Medication Sig Dispense Refill     albuterol (VENTOLIN HFA) 108 (90 BASE) MCG/ACT inhaler Inhale 1-2 puffs into the lungs 4 times daily as needed 3 Inhaler 0     beclomethasone (QVAR) 80 MCG/ACT Inhaler Inhale 2 puffs into the lungs 2 times daily with adaptor 3 Inhaler 11     buPROPion (WELLBUTRIN SR) 200 MG 12 hr tablet Take 1 tablet (200 mg) by mouth 2 times daily 180 tablet 3     DULoxetine (CYMBALTA) 20 MG EC capsule Take 1 capsule (20 mg) by mouth daily 90 capsule 3     esomeprazole (NEXIUM) 20 MG capsule Take 1 capsule (20 mg) by mouth every morning (before breakfast) Take 30-60 minutes before eating. 30 capsule 0     estrogens-methylTESTOSTERone (ESTRATEST) 1.25-2.5 MG per tablet Take 1 tablet by mouth daily 90 tablet 3     fexofenadine (ALLEGRA) 180 MG tablet Take 1 tablet by mouth daily. 90 tablet 3     fluticasone (FLONASE) 50 MCG/ACT spray SPRAY 2 SPRAYS INTO EACH NOSTRIL DAILY. 48 g 3     lisinopril (PRINIVIL,ZESTRIL) 30 MG tablet Take 1 tablet (30 mg) by mouth daily 90 tablet 3     simvastatin (ZOCOR) 40 MG tablet TAKE ONE TABLET BY MOUTH   EVERY NIGHT AT BEDTIME 90 tablet 3     SIMVASTATIN PO        SUMAtriptan (IMITREX) 5 MG/ACT nasal spray Spray 1 spray (5  "mg) in nostril as needed for migraine 1 Inhaler 12     traZODone (DESYREL) 50 MG tablet Take 1-2 tablets ( mg) by mouth nightly as needed for sleep 180 tablet 1     tretinoin (RETIN-A) 0.05 % cream Spread a pea size amount into affected area.  Use sunscreen SPF>20. 45 g 11     BP Readings from Last 3 Encounters:   05/10/18 123/83   04/24/18 120/80   09/22/17 120/70    Wt Readings from Last 3 Encounters:   05/10/18 153 lb 6.4 oz (69.6 kg)   04/24/18 153 lb 1.6 oz (69.4 kg)   09/22/17 158 lb 12.8 oz (72 kg)                    Reviewed and updated as needed this visit by clinical staff       Reviewed and updated as needed this visit by Provider         ROS:  Constitutional, HEENT, cardiovascular, pulmonary, gi and gu systems are negative, except as otherwise noted.    OBJECTIVE:                                                    /83 (BP Location: Right arm, Patient Position: Chair, Cuff Size: Adult Large)  Pulse 82  Temp 98  F (36.7  C) (Oral)  Ht 5' 5.5\" (1.664 m)  Wt 153 lb 6.4 oz (69.6 kg)  SpO2 94%  BMI 25.14 kg/m2  Body mass index is 25.14 kg/(m^2).  GENERAL APPEARANCE: healthy, alert and no distress  HENT: ear canals and TM's normal and nose and mouth without ulcers or lesions  RESP: lungs clear to auscultation - no rales, rhonchi or wheezes  CV: regular rates and rhythm, normal S1 S2, no S3 or S4 and no murmur, click or rub  LYMPHATICS: no cervical adenopathy    Diagnostic test results:  Diagnostic Test Results:  none      ASSESSMENT/PLAN:                                                    1. Throat pain    - Strep, Rapid Screen  - Beta strep group A culture    2. Viral URI  The patient is advised to push fluids, rest, gargle warm salt water, use vaporizer or mist needed  and Return office visit if symptoms persist or worsen.            Ramona Ann Aaseby-Aguilera, PA-C  [facility]    "

## 2018-05-10 NOTE — NURSING NOTE
"Chief Complaint   Patient presents with     Pharyngitis       Initial /83 (BP Location: Right arm, Patient Position: Chair, Cuff Size: Adult Large)  Pulse 82  Temp 98  F (36.7  C) (Oral)  Ht 5' 5.5\" (1.664 m)  Wt 153 lb 6.4 oz (69.6 kg)  SpO2 94%  BMI 25.14 kg/m2 Estimated body mass index is 25.14 kg/(m^2) as calculated from the following:    Height as of this encounter: 5' 5.5\" (1.664 m).    Weight as of this encounter: 153 lb 6.4 oz (69.6 kg).  Medication Reconciliation: complete      Health Maintenance addressed:  NONE    n/a      "

## 2018-05-10 NOTE — MR AVS SNAPSHOT
"              After Visit Summary   5/10/2018    Breonna Caruso    MRN: [id number]           Patient Information     Date Of Birth          1955        Visit Information        Provider Department      5/10/2018 9:45 AM Aaseby-Aguilera, Ramona Ann, PA-C [facility]        Today's Diagnoses     Throat pain    -  1    Viral URI           Follow-ups after your visit        Who to contact     If you have questions or need follow up information about today's clinic visit or your schedule please contact [facility] directly at [phone].  Normal or non-critical lab and imaging results will be communicated to you by [employer], letter or phone within 4 business days after the clinic has received the results. If you do not hear from us within 7 days, please contact the clinic through [employer] or phone. If you have a critical or abnormal lab result, we will notify you by phone as soon as possible.  Submit refill requests through [employer] or call your pharmacy and they will forward the refill request to us. Please allow 3 business days for your refill to be completed.          Additional Information About Your Visit        MyChart Information     [employer] gives you secure access to your electronic health record. If you see a primary care provider, you can also send messages to your care team and make appointments. If you have questions, please call your primary care clinic.  If you do not have a primary care provider, please call [phone] and they will assist you.        Care EveryWhere ID     This is your Care EveryWhere ID. This could be used by other organizations to access your [facility] medical records  [id number]        Your Vitals Were     Pulse Temperature Height Pulse Oximetry BMI (Body Mass Index)       82 98  F (36.7  C) (Oral) 5' 5.5\" (1.664 m) 94% 25.14 kg/m2        Blood Pressure from Last 3 Encounters:   05/10/18 123/83   04/24/18 120/80   09/22/17 120/70    Weight from " Last 3 Encounters:   05/10/18 153 lb 6.4 oz (69.6 kg)   04/24/18 153 lb 1.6 oz (69.4 kg)   09/22/17 158 lb 12.8 oz (72 kg)              We Performed the Following     Beta strep group A culture     Strep, Rapid Screen        Primary Care Provider Office Phone # Fax #    Lula Ann Aaseby-Aguilera, PA-C [phone] [address] [facility] [address]        Equal Access to Services     VALERIY HENDRIX : Hadii aad ku hadasho Soomaali, waaxda luqadaha, qaybta kaalmada adeegyada, waxay idiin hayaan adeeg kharatosha rain . So [facility] [phone].    ATENCIÓN: Si habla español, tiene a lombardo disposición servicios gratuitos de asistencia lingüística. Llame al [phone].    We comply with applicable federal civil rights laws and [place] laws. We do not discriminate on the basis of race, color, national origin, age, disability, sex, sexual orientation, or gender identity.            Thank you!     Thank you for choosing [facility]  for your care. Our goal is always to provide you with excellent care. Hearing back from our patients is one way we can continue to improve our services. Please take a few minutes to complete the written survey that you may receive in the mail after your visit with us. Thank you!             Your Updated Medication List - Protect others around you: Learn how to safely use, store and throw away your medicines at www.disposemymeds.org.          This list is accurate as of 5/10/18 10:40 AM.  Always use your most recent med list.                   Brand Name Dispense Instructions for use Diagnosis    albuterol 108 (90 Base) MCG/ACT Inhaler    VENTOLIN HFA    3 Inhaler    Inhale 1-2 puffs into the lungs 4 times daily as needed    Mild persistent asthma without complication       beclomethasone 80 MCG/ACT Inhaler    QVAR    3 Inhaler    Inhale 2 puffs into the lungs 2 times daily with adaptor    Mild persistent asthma without complication       buPROPion 200 MG 12 hr  tablet    WELLBUTRIN SR    180 tablet    Take 1 tablet (200 mg) by mouth 2 times daily    Decreased libido       DULoxetine 20 MG EC capsule    CYMBALTA    90 capsule    Take 1 capsule (20 mg) by mouth daily    Major depressive disorder, recurrent episode, mild (H)       esomeprazole 20 MG CR capsule    nexIUM    30 capsule    Take 1 capsule (20 mg) by mouth every morning (before breakfast) Take 30-60 minutes before eating.    GERD (gastroesophageal reflux disease)       estrogens-methylTESTOSTERone 1.25-2.5 MG per tablet    ESTRATEST    90 tablet    Take 1 tablet by mouth daily    Decreased libido       fexofenadine 180 MG tablet    ALLEGRA    90 tablet    Take 1 tablet by mouth daily.    Allergies       fluticasone 50 MCG/ACT spray    FLONASE    48 g    SPRAY 2 SPRAYS INTO EACH NOSTRIL DAILY.    Rhinitis, allergic       lisinopril 30 MG tablet    PRINIVIL,ZESTRIL    90 tablet    Take 1 tablet (30 mg) by mouth daily    HTN, goal below 140/90       * SIMVASTATIN PO           * simvastatin 40 MG tablet    ZOCOR    90 tablet    TAKE ONE TABLET BY MOUTH   EVERY NIGHT AT BEDTIME    Hyperlipidemia LDL goal <160       SUMAtriptan 5 MG/ACT nasal spray    IMITREX    1 Inhaler    Spray 1 spray (5 mg) in nostril as needed for migraine    Intractable chronic migraine without aura and without status migrainosus       traZODone 50 MG tablet    DESYREL    180 tablet    Take 1-2 tablets ( mg) by mouth nightly as needed for sleep    Persistent insomnia, Snoring       tretinoin 0.05 % cream    RETIN-A    45 g    Spread a pea size amount into affected area.  Use sunscreen SPF>20.    Acne       * Notice:  This list has 2 medication(s) that are the same as other medications prescribed for you. Read the directions carefully, and ask your doctor or other care provider to review them with you.

## 2018-05-11 LAB
BACTERIA SPEC CULT: NORMAL
SPECIMEN SOURCE: NORMAL

## 2018-05-23 ENCOUNTER — MYC REFILL (OUTPATIENT)
Dept: FAMILY MEDICINE | Facility: CLINIC | Age: 63
End: 2018-05-23

## 2018-05-23 DIAGNOSIS — J45.30 MILD PERSISTENT ASTHMA WITHOUT COMPLICATION: ICD-10-CM

## 2018-05-24 RX ORDER — ALBUTEROL SULFATE 90 UG/1
1-2 AEROSOL, METERED RESPIRATORY (INHALATION) 4 TIMES DAILY PRN
Qty: 3 INHALER | Refills: 0 | Status: SHIPPED | OUTPATIENT
Start: 2018-05-24 | End: 2018-06-18

## 2018-05-24 NOTE — TELEPHONE ENCOUNTER
Prescription approved per [facility] Refill Protocol.  Mailed ACT to pt's home   Delfina Tovar RN

## 2018-05-24 NOTE — TELEPHONE ENCOUNTER
Message from [employer]:  Original authorizing provider: Ramona Ann Aaseby-Aguilera, PA-C [facility] would like a refill of the following medications:  albuterol (VENTOLIN HFA) 108 (90 BASE) MCG/ACT inhaler [Ramona Ann Aaseby-Aguilera, PA-C]    Preferred pharmacy: [facility] - [place], [place] - [address] AT PORTAL TO REGISTERED [facility] SITES    Comment:

## 2018-05-29 ENCOUNTER — TELEPHONE (OUTPATIENT)
Dept: FAMILY MEDICINE | Facility: CLINIC | Age: 63
End: 2018-05-29

## 2018-05-29 DIAGNOSIS — J45.30 MILD PERSISTENT ASTHMA: Primary | ICD-10-CM

## 2018-05-29 RX ORDER — ALBUTEROL SULFATE 90 UG/1
2 AEROSOL, METERED RESPIRATORY (INHALATION) EVERY 6 HOURS PRN
Qty: 3 INHALER | Refills: 0 | Status: SHIPPED | OUTPATIENT
Start: 2018-05-29 | End: 2022-11-15

## 2018-05-29 NOTE — TELEPHONE ENCOUNTER
[facility] calling stating the albuterol that was sent in is not covered by insurance.  New script sent to have generic albuterol to be dispensed  Poonam Taylor RN, BSN

## 2018-06-12 ENCOUNTER — TELEPHONE (OUTPATIENT)
Dept: FAMILY MEDICINE | Facility: CLINIC | Age: 63
End: 2018-06-12

## 2018-06-12 DIAGNOSIS — R68.82 DECREASED LIBIDO: ICD-10-CM

## 2018-06-12 RX ORDER — ESTERIFIED ESTROGEN AND METHYLTESTOSTERONE 1.25; 2.5 MG/1; MG/1
1 TABLET ORAL DAILY
Qty: 90 TABLET | Refills: 3 | Status: SHIPPED | OUTPATIENT
Start: 2018-06-12 | End: 2019-08-06

## 2018-06-12 NOTE — TELEPHONE ENCOUNTER
Medication is not covered by her insurance per [facility].  Advised she will need to call her insurance to see what is on her formulary  Poonam Taylor RN, BSN

## 2018-06-12 NOTE — TELEPHONE ENCOUNTER
Patient is almost out of this medication, she needs this sent to [facility] in [place]. The last Rx was local print so I don't think it was sent anywhere.  Please review and advise call patient if this can't be sent in.  estrogens-methylTESTOSTERone (ESTRATEST) 1.25-2.5 MG per tablet 90 tablet 3 4/24/2018  No   Sig - Route: Take 1 tablet by mouth daily - Oral   Class: Local Print     Kalpana Alexander

## 2018-06-12 NOTE — TELEPHONE ENCOUNTER
Rx approved, fax to the [facility], Pharmacy will notified patient when prescription is ready to be picked up.   Kalpana Alexander

## 2018-06-12 NOTE — TELEPHONE ENCOUNTER
Pt calling back stating that pharmacy told pt that estradiol or premarin might be covered alternatives.  Please advise on changing script due to insurance.    Poonam Taylor RN, BSN

## 2018-06-12 NOTE — TELEPHONE ENCOUNTER
Please advise on refilling medication.   was check and nothing from the April script has been filled  Poonam Taylor RN, BSN

## 2018-06-12 NOTE — TELEPHONE ENCOUNTER
Pt returned call, given message below. Pt is unable to find her last OV AVS, she is in the middle of moving to her new address in [place] (chart updated) she did get a text from [facility] that there was an Rx ready for her this morning, she is going to check with the pharmacy and call back.     Efra Vega   06/12/18 10:46 AM

## 2018-06-18 ENCOUNTER — RADIANT APPOINTMENT (OUTPATIENT)
Dept: GENERAL RADIOLOGY | Facility: CLINIC | Age: 63
End: 2018-06-18
Attending: PHYSICIAN ASSISTANT
Payer: COMMERCIAL

## 2018-06-18 ENCOUNTER — OFFICE VISIT (OUTPATIENT)
Dept: FAMILY MEDICINE | Facility: CLINIC | Age: 63
End: 2018-06-18
Payer: COMMERCIAL

## 2018-06-18 VITALS
SYSTOLIC BLOOD PRESSURE: 140 MMHG | RESPIRATION RATE: 16 BRPM | TEMPERATURE: 100.6 F | DIASTOLIC BLOOD PRESSURE: 84 MMHG | HEART RATE: 83 BPM | BODY MASS INDEX: 24.06 KG/M2 | WEIGHT: 149.7 LBS | OXYGEN SATURATION: 96 % | HEIGHT: 66 IN

## 2018-06-18 DIAGNOSIS — D70.9 NEUTROPENIC FEVER (H): ICD-10-CM

## 2018-06-18 DIAGNOSIS — R05.9 COUGH: ICD-10-CM

## 2018-06-18 DIAGNOSIS — R50.81 NEUTROPENIC FEVER (H): ICD-10-CM

## 2018-06-18 DIAGNOSIS — H66.001 ACUTE SUPPURATIVE OTITIS MEDIA OF RIGHT EAR WITHOUT SPONTANEOUS RUPTURE OF TYMPANIC MEMBRANE, RECURRENCE NOT SPECIFIED: ICD-10-CM

## 2018-06-18 DIAGNOSIS — J20.9 ACUTE BRONCHITIS, UNSPECIFIED ORGANISM: Primary | ICD-10-CM

## 2018-06-18 PROCEDURE — 99213 OFFICE O/P EST LOW 20 MIN: CPT | Performed by: PHYSICIAN ASSISTANT

## 2018-06-18 PROCEDURE — 71046 X-RAY EXAM CHEST 2 VIEWS: CPT

## 2018-06-18 RX ORDER — AZITHROMYCIN 250 MG/1
TABLET, FILM COATED ORAL
Qty: 6 TABLET | Refills: 0 | Status: SHIPPED | OUTPATIENT
Start: 2018-06-18 | End: 2018-06-26

## 2018-06-18 RX ORDER — BENZONATATE 200 MG/1
200 CAPSULE ORAL 3 TIMES DAILY PRN
Qty: 21 CAPSULE | Refills: 0 | Status: SHIPPED | OUTPATIENT
Start: 2018-06-18 | End: 2018-11-25

## 2018-06-18 NOTE — PROGRESS NOTES
SUBJECTIVE:   Breonna Caruso is a 63 year old female who presents to clinic today for the following health issues:      Acute Illness   Acute illness concerns: cough  Onset: 1 week     Fever: YES    Chills/Sweats: YES    Headache (location?): YES    Sinus Pressure:YES    Conjunctivitis:  no    Ear Pain: no    Rhinorrhea: YES    Congestion: YES    Sore Throat: no      Cough: YES    Wheeze: YES    Decreased Appetite: YES    Nausea: no    Vomiting: no    Diarrhea:  no    Dysuria/Freq.: no    Fatigue/Achiness: YES    Sick/Strep Exposure: no     Therapies Tried and outcome: inhaler, flonase, IBU,           Problem list and histories reviewed & adjusted, as indicated.  Additional history: as documented    Current Outpatient Prescriptions   Medication Sig Dispense Refill     albuterol (PROAIR HFA/PROVENTIL HFA/VENTOLIN HFA) 108 (90 Base) MCG/ACT Inhaler Inhale 2 puffs into the lungs every 6 hours as needed for shortness of breath / dyspnea or wheezing 3 Inhaler 0     azithromycin (ZITHROMAX) 250 MG tablet Two tablets first day, then one tablet daily for four days. 6 tablet 0     benzonatate (TESSALON) 200 MG capsule Take 1 capsule (200 mg) by mouth 3 times daily as needed for cough 21 capsule 0     buPROPion (WELLBUTRIN SR) 200 MG 12 hr tablet Take 1 tablet (200 mg) by mouth 2 times daily 180 tablet 3     DULoxetine (CYMBALTA) 20 MG EC capsule Take 1 capsule (20 mg) by mouth daily 90 capsule 3     esomeprazole (NEXIUM) 20 MG capsule Take 1 capsule (20 mg) by mouth every morning (before breakfast) Take 30-60 minutes before eating. 30 capsule 0     estrogens, conjugated, (PREMARIN) 0.3 MG tablet Take 1 tablet (0.3 mg) by mouth daily 90 tablet 3     fexofenadine (ALLEGRA) 180 MG tablet Take 1 tablet by mouth daily. 90 tablet 3     fluticasone (FLONASE) 50 MCG/ACT spray SPRAY 2 SPRAYS INTO EACH NOSTRIL DAILY. 48 g 3     lisinopril (PRINIVIL,ZESTRIL) 30 MG tablet Take 1 tablet (30 mg) by mouth daily 90 tablet 3      "simvastatin (ZOCOR) 40 MG tablet TAKE ONE TABLET BY MOUTH   EVERY NIGHT AT BEDTIME 90 tablet 3     SUMAtriptan (IMITREX) 5 MG/ACT nasal spray Spray 1 spray (5 mg) in nostril as needed for migraine 1 Inhaler 12     traZODone (DESYREL) 50 MG tablet Take 1-2 tablets ( mg) by mouth nightly as needed for sleep 180 tablet 1     tretinoin (RETIN-A) 0.05 % cream Spread a pea size amount into affected area.  Use sunscreen SPF>20. 45 g 11     estrogens-methylTESTOSTERone (ESTRATEST) 1.25-2.5 MG per tablet Take 1 tablet by mouth daily (Patient not taking: Reported on 6/18/2018) 90 tablet 3     [DISCONTINUED] albuterol (VENTOLIN HFA) 108 (90 Base) MCG/ACT Inhaler Inhale 1-2 puffs into the lungs 4 times daily as needed 3 Inhaler 0     [DISCONTINUED] SIMVASTATIN PO        BP Readings from Last 3 Encounters:   06/18/18 140/84   05/10/18 123/83   04/24/18 120/80    Wt Readings from Last 3 Encounters:   06/18/18 149 lb 11.2 oz (67.9 kg)   05/10/18 153 lb 6.4 oz (69.6 kg)   04/24/18 153 lb 1.6 oz (69.4 kg)                    Reviewed and updated as needed this visit by clinical staff  Tobacco  Allergies  Meds  Med Hx  Surg Hx  Fam Hx  Soc Hx      Reviewed and updated as needed this visit by Provider         ROS:  Constitutional, HEENT, cardiovascular, pulmonary, gi and gu systems are negative, except as otherwise noted.    OBJECTIVE:                                                    /84 (BP Location: Right arm, Patient Position: Chair, Cuff Size: Adult Regular)  Pulse 83  Temp 100.6  F (38.1  C) (Oral)  Resp 16  Ht 5' 5.5\" (1.664 m)  Wt 149 lb 11.2 oz (67.9 kg)  SpO2 96%  Breastfeeding? No  BMI 24.53 kg/m2  Body mass index is 24.53 kg/(m^2).  GENERAL APPEARANCE: healthy, alert and mild distress  HENT: nose and mouth without ulcers or lesions, TM's pearly grey, normal light reflex left and TM congested/bulging right  RESP: lungs clear to auscultation - no rales, rhonchi or wheezes  CV: regular rates and " rhythm, normal S1 S2, no S3 or S4 and no murmur, click or rub  LYMPHATICS: no cervical adenopathy    Diagnostic test results:  Diagnostic Test Results:  none      ASSESSMENT/PLAN:                                                    1. Neutropenic fever (H)    - XR Chest 2 Views; Future    2. Cough    - benzonatate (TESSALON) 200 MG capsule; Take 1 capsule (200 mg) by mouth 3 times daily as needed for cough  Dispense: 21 capsule; Refill: 0    3. Acute bronchitis, unspecified organism    - azithromycin (ZITHROMAX) 250 MG tablet; Two tablets first day, then one tablet daily for four days.  Dispense: 6 tablet; Refill: 0    4. Acute suppurative otitis media of right ear without spontaneous rupture of tympanic membrane, recurrence not specified    - benzonatate (TESSALON) 200 MG capsule; Take 1 capsule (200 mg) by mouth 3 times daily as needed for cough  Dispense: 21 capsule; Refill: 0      Patient Instructions   (D70.9,  R50.81) Neutropenic fever (H)  (primary encounter diagnosis)  Comment:   Plan: XR Chest 2 Views            (R05) Cough  Comment:   Plan: benzonatate (TESSALON) 200 MG capsule            (J20.9) Acute bronchitis, unspecified organism  Comment: The patient is advised to push fluids, rest, gargle warm salt water, use vaporizer or mist needed , use acetaminophen, ibuprofen as needed and Return office visit if symptoms persist or worsen.    Plan: azithromycin (ZITHROMAX) 250 MG tablet            (H66.001) Acute suppurative otitis media of right ear without spontaneous rupture of tympanic membrane, recurrence not specified  Comment:   Plan: benzonatate (TESSALON) 200 MG capsule                  Ramona Ann Aaseby-Aguilera, PA-C  [facility]

## 2018-06-18 NOTE — PATIENT INSTRUCTIONS
(D70.9,  R50.81) Neutropenic fever (H)  (primary encounter diagnosis)  Comment:   Plan: XR Chest 2 Views            (R05) Cough  Comment:   Plan: benzonatate (TESSALON) 200 MG capsule            (J20.9) Acute bronchitis, unspecified organism  Comment: The patient is advised to push fluids, rest, gargle warm salt water, use vaporizer or mist needed , use acetaminophen, ibuprofen as needed and Return office visit if symptoms persist or worsen.    Plan: azithromycin (ZITHROMAX) 250 MG tablet            (H66.001) Acute suppurative otitis media of right ear without spontaneous rupture of tympanic membrane, recurrence not specified  Comment:   Plan: benzonatate (TESSALON) 200 MG capsule

## 2018-06-18 NOTE — MR AVS SNAPSHOT
After Visit Summary   6/18/2018    Breonna Caruso    MRN: [id number]           Patient Information     Date Of Birth          1955        Visit Information        Provider Department      6/18/2018 2:30 PM Aaseby-Aguilera, Ramona Ann, PA-C [facility]        Today's Diagnoses     Neutropenic fever (H)    -  1    Cough        Acute bronchitis, unspecified organism        Acute suppurative otitis media of right ear without spontaneous rupture of tympanic membrane, recurrence not specified          Care Instructions    (D70.9,  R50.81) Neutropenic fever (H)  (primary encounter diagnosis)  Comment:   Plan: XR Chest 2 Views            (R05) Cough  Comment:   Plan: benzonatate (TESSALON) 200 MG capsule            (J20.9) Acute bronchitis, unspecified organism  Comment: The patient is advised to push fluids, rest, gargle warm salt water, use vaporizer or mist needed , use acetaminophen, ibuprofen as needed and Return office visit if symptoms persist or worsen.    Plan: azithromycin (ZITHROMAX) 250 MG tablet            (H66.001) Acute suppurative otitis media of right ear without spontaneous rupture of tympanic membrane, recurrence not specified  Comment:   Plan: benzonatate (TESSALON) 200 MG capsule                      Follow-ups after your visit        Follow-up notes from your care team     Return in about 1 year (around 6/18/2019) for Physical Exam.      Who to contact     If you have questions or need follow up information about today's clinic visit or your schedule please contact [facility] directly at [phone].  Normal or non-critical lab and imaging results will be communicated to you by MyChart, letter or phone within 4 business days after the clinic has received the results. If you do not hear from us within 7 days, please contact the clinic through MyChart or phone. If you have a critical or abnormal lab result, we will notify you by phone as soon as  "possible.  Submit refill requests through [employer] or call your pharmacy and they will forward the refill request to us. Please allow 3 business days for your refill to be completed.          Additional Information About Your Visit        [employer] Information     [employer] gives you secure access to your electronic health record. If you see a primary care provider, you can also send messages to your care team and make appointments. If you have questions, please call your primary care clinic.  If you do not have a primary care provider, please call [phone] and they will assist you.        Care EveryWhere ID     This is your Care EveryWhere ID. This could be used by other organizations to access your [facility] medical records  [id number]        Your Vitals Were     Pulse Temperature Respirations Height Pulse Oximetry Breastfeeding?    83 100.6  F (38.1  C) (Oral) 16 5' 5.5\" (1.664 m) 96% No    BMI (Body Mass Index)                   24.53 kg/m2            Blood Pressure from Last 3 Encounters:   06/18/18 140/84   05/10/18 123/83   04/24/18 120/80    Weight from Last 3 Encounters:   06/18/18 149 lb 11.2 oz (67.9 kg)   05/10/18 153 lb 6.4 oz (69.6 kg)   04/24/18 153 lb 1.6 oz (69.4 kg)                 Today's Medication Changes          These changes are accurate as of 6/18/18  3:23 PM.  If you have any questions, ask your nurse or doctor.               Start taking these medicines.        Dose/Directions    azithromycin 250 MG tablet   Commonly known as:  ZITHROMAX   Used for:  Acute bronchitis, unspecified organism   Started by:  Aaseby-Aguilera, Ramona Ann, PA-C        Two tablets first day, then one tablet daily for four days.   Quantity:  6 tablet   Refills:  0       benzonatate 200 MG capsule   Commonly known as:  TESSALON   Used for:  Cough, Acute suppurative otitis media of right ear without spontaneous rupture of tympanic membrane, recurrence not specified   Started by:  Aaseby-Aguilera, Ramona Ann, PA-C     "    Dose:  200 mg   Take 1 capsule (200 mg) by mouth 3 times daily as needed for cough   Quantity:  21 capsule   Refills:  0            Where to get your medicines      These medications were sent to [facility]/pharmacy #[id number] - [place], [place] - [address] [address]  [address] [address] [address]    Hours:  [facility] to [facility] Phone:  [phone]     azithromycin 250 MG tablet    benzonatate 200 MG capsule                Primary Care Provider Office Phone # Fax #    Ramona Ann Aaseby-Aguilera, PA-C [phone] [address]  [facility] [address]        Equal Access to Services     MIKE HENDRIX : Hadii aad ku hadasho Soomaali, waaxda luqadaha, qaybta kaalmada adeegyada, leslie rubin. So [facility] [phone].    ATENCIÓN: Si habla español, tiene a lombardo disposición servicios gratuitos de asistencia lingüística. [facility] [phone].    We comply with applicable federal civil rights laws and [place] laws. We do not discriminate on the basis of race, color, national origin, age, disability, sex, sexual orientation, or gender identity.            Thank you!     Thank you for choosing [facility]  for your care. Our goal is always to provide you with excellent care. Hearing back from our patients is one way we can continue to improve our services. Please take a few minutes to complete the written survey that you may receive in the mail after your visit with us. Thank you!             Your Updated Medication List - Protect others around you: Learn how to safely use, store and throw away your medicines at www.disposemymeds.org.          This list is accurate as of 6/18/18  3:23 PM.  Always use your most recent med list.                   Brand Name Dispense Instructions for use Diagnosis    albuterol 108 (90 Base) MCG/ACT Inhaler    PROAIR HFA/PROVENTIL HFA/VENTOLIN HFA    3 Inhaler    Inhale 2 puffs into the lungs every 6 hours as needed for shortness  of breath / dyspnea or wheezing    Mild persistent asthma       azithromycin 250 MG tablet    ZITHROMAX    6 tablet    Two tablets first day, then one tablet daily for four days.    Acute bronchitis, unspecified organism       benzonatate 200 MG capsule    TESSALON    21 capsule    Take 1 capsule (200 mg) by mouth 3 times daily as needed for cough    Cough, Acute suppurative otitis media of right ear without spontaneous rupture of tympanic membrane, recurrence not specified       buPROPion 200 MG 12 hr tablet    WELLBUTRIN SR    180 tablet    Take 1 tablet (200 mg) by mouth 2 times daily    Decreased libido       DULoxetine 20 MG EC capsule    CYMBALTA    90 capsule    Take 1 capsule (20 mg) by mouth daily    Major depressive disorder, recurrent episode, mild (H)       esomeprazole 20 MG CR capsule    nexIUM    30 capsule    Take 1 capsule (20 mg) by mouth every morning (before breakfast) Take 30-60 minutes before eating.    GERD (gastroesophageal reflux disease)       estrogens (conjugated) 0.3 MG tablet    PREMARIN    90 tablet    Take 1 tablet (0.3 mg) by mouth daily    Decreased libido       estrogens-methylTESTOSTERone 1.25-2.5 MG per tablet    ESTRATEST    90 tablet    Take 1 tablet by mouth daily    Decreased libido       fexofenadine 180 MG tablet    ALLEGRA    90 tablet    Take 1 tablet by mouth daily.    Allergies       fluticasone 50 MCG/ACT spray    FLONASE    48 g    SPRAY 2 SPRAYS INTO EACH NOSTRIL DAILY.    Rhinitis, allergic       lisinopril 30 MG tablet    PRINIVIL,ZESTRIL    90 tablet    Take 1 tablet (30 mg) by mouth daily    HTN, goal below 140/90       simvastatin 40 MG tablet    ZOCOR    90 tablet    TAKE ONE TABLET BY MOUTH   EVERY NIGHT AT BEDTIME    Hyperlipidemia LDL goal <160       SUMAtriptan 5 MG/ACT nasal spray    IMITREX    1 Inhaler    Spray 1 spray (5 mg) in nostril as needed for migraine    Intractable chronic migraine without aura and without status migrainosus       traZODone 50  MG tablet    DESYREL    180 tablet    Take 1-2 tablets ( mg) by mouth nightly as needed for sleep    Persistent insomnia, Snoring       tretinoin 0.05 % cream    RETIN-A    45 g    Spread a pea size amount into affected area.  Use sunscreen SPF>20.    Acne

## 2018-06-26 ENCOUNTER — TELEPHONE (OUTPATIENT)
Dept: FAMILY MEDICINE | Facility: CLINIC | Age: 63
End: 2018-06-26

## 2018-06-26 NOTE — TELEPHONE ENCOUNTER
"Pt calling to requesting refill on antibiotic-     Pt is still coughing and now \"blowing a lot of green junk from my nose\"    Pt has not stared using neti pot but does use Flonase daily.      Using benzonatate only prn.      Advised will still have coverage from antibiotic for a full 10 days.  Would recommend starting neti- pot 2-4 X day. Continue Flonase and benzonatate but try taking regular 2-3 X day while still coughing. If still not improving after 2-3 days be seen     Pt expressed understanding and acceptance of the plan.  Pt had no further questions at this time.  Advised can call back to clinic at any time with concerns.       Delfina Tovar, RN      "

## 2018-08-01 DIAGNOSIS — J45.30 MILD PERSISTENT ASTHMA: ICD-10-CM

## 2018-08-01 RX ORDER — ALBUTEROL SULFATE 90 UG/1
AEROSOL, METERED RESPIRATORY (INHALATION)
Qty: 25.5 G | Refills: 0 | OUTPATIENT
Start: 2018-08-01

## 2018-08-01 NOTE — TELEPHONE ENCOUNTER
DATE OF DISCHARGE:  08/29/2017



HOSPITAL COURSE:  The patient is sitting comfortably in her chair, in no

apparent distress.  She was originally admitted with shortness of breath and

hypotension, and was found to be in recurrent atrial fibrillation with a rapid

ventricular response.  She did require dopamine for a short period of time, but

that has resolved; however, she was also started on amiodarone drip and her

heart rate has improved.  She has had also a COPD exacerbation with

acute-on-chronic respiratory insufficiency for which she was started on

steroids, as well as inhalers, and she did actually very well and the decision

was made to discharge her home and to follow up with her Cardiology team as an

outpatient.



PHYSICAL EXAMINATION:

GENERAL:  When I saw her this afternoon, she looked well and was clearly in no

apparent respiratory distress, or pale, not jaundiced, cyanosis, or thyromegaly.

 No jugular venous distention.  No limb edema.

VITAL SIGNS:  Her heart rate is 84, blood pressure was 95/55, temperature was

97.4, respiratory rate 24, and oxygen saturation was 95% on room air.

HEAD, EYES, EARS, NOSE AND THROAT:  Showed normocephalic, atraumatic.

NECK:  Supple.

HEART:  Showed normal first and second heart sounds with no gallop, rub, or

murmur.

CHEST:  Shows central trachea, equally reduced expansion, reduced air entry,

____ sounds.  I could not really appreciate any crepitation or rhonchi.

ABDOMEN:  Slightly distended, soft, nontender.

NEUROLOGIC:  She is awake, alert, and responding appropriately.  Cranial nerves

are intact.  She moves the extremities without difficulty.  She ambulates

without assistance or assistive devices.



Her intake was 1550, output was 3650.



LABORATORY DATA:  Her white cell count 10,000, hemoglobin 11.9, hematocrit 37.6,

MCV 93, and platelet count 275,000.  Her serum sodium 141, potassium 4.8,

chloride 105, bicarbonate 32, anion gap of 4, BUN 25, creatinine 1.2, estimated

GFR was 43 mL per minute.  Her glucose was 119.  Calcium was 9.2.  Total

bilirubin, AST, ALT were normal.  Alkaline phosphatase was slightly elevated. 

Total protein was 6.6, albumin was 3.3.



FINAL DISCHARGE DIAGNOSES:

1.  Recurrent atrial fibrillation with rapid ventricular response, to continue

with amiodarone orally and continue with Xarelto for stroke prophylaxis.

2.  Respiratory insufficiency, acute on chronic, multifactorial due to chronic

obstructive pulmonary disease and acute-on-chronic combined systolic and

diastolic heart failure.

3.  Renal insufficiency.

4.  ____, resolved.

5.  Hyperlipidemia, on statin.  She will be followed in 4 weeks' time at the

Cardiology office by  ____.



DISCHARGE MEDICATIONS:  She was discharged home on the following medication: 

Amiodarone 200 mg once a day, prednisone 40 mg daily, furosemide 40 mg daily,

rivaroxaban 20 mg daily, Plavix 75 mg once a day, aspirin 81 mg once a day,

potassium chloride 20 mEq twice a day, simvastatin 5 mg at bedtime, Mucinex 600

mg twice a day, DuoNeb 4 times a day, and ondansetron 4 mg every 8 hours.





______________________________

LUCY DE SOUZA MD



DR:  VIVIENNE/selvin  JOB#:  [id number] / [id number]

DD:  08/29/2017 14:19  DT:  08/29/2017 21:14 Spoke with pt to verify that she has not used all 3 inhalers as this would be concerning.  Pt states she did not request this and to disregard this    Poonam Taylor RN, BSN

## 2018-08-01 NOTE — TELEPHONE ENCOUNTER
"Requested Prescriptions   Pending Prescriptions Disp Refills     PROAIR  (90 Base) MCG/ACT inhaler [Pharmacy Med Name: PROAIR HFA INH] 25.5 g 0    Last Written Prescription Date:  05/29/2018  Last Fill Quantity: 3 inhaler,  # refills: 0   Last office visit: 6/18/2018 with prescribing provider:  06/18/2018   Future Office Visit:     Sig: FOR DIRECTIONS ON HOW TO   TAKE THIS MEDICINE, READ   THE ENCLOSED MEDICATION    INFORMATION FORM    Asthma Maintenance Inhalers - Anticholinergics Passed    8/1/2018  5:25 AM       Passed - Patient is age 12 years or older       Passed - Asthma control assessment score within normal limits in last 6 months    Please review ACT score.          Passed - Recent (6 mo) or future (30 days) visit within the authorizing provider's specialty    Patient had office visit in the last 6 months or has a visit in the next 30 days with authorizing provider or within the authorizing provider's specialty.  See \"Patient Info\" tab in inbasket, or \"Choose Columns\" in Meds & Orders section of the refill encounter.              Wili June XRT  "

## 2018-09-04 DIAGNOSIS — J30.9 RHINITIS, ALLERGIC: ICD-10-CM

## 2018-09-04 DIAGNOSIS — I10 HTN, GOAL BELOW 140/90: ICD-10-CM

## 2018-09-04 DIAGNOSIS — F33.0 MAJOR DEPRESSIVE DISORDER, RECURRENT EPISODE, MILD (H): ICD-10-CM

## 2018-09-04 RX ORDER — DULOXETIN HYDROCHLORIDE 20 MG/1
CAPSULE, DELAYED RELEASE ORAL
Qty: 90 CAPSULE | Refills: 1 | Status: SHIPPED | OUTPATIENT
Start: 2018-09-04 | End: 2019-08-06

## 2018-09-04 RX ORDER — FLUTICASONE PROPIONATE 50 MCG
SPRAY, SUSPENSION (ML) NASAL
Qty: 48 G | Refills: 1 | Status: SHIPPED | OUTPATIENT
Start: 2018-09-04 | End: 2019-03-03

## 2018-09-04 RX ORDER — LISINOPRIL 30 MG/1
TABLET ORAL
Qty: 90 TABLET | Refills: 1 | Status: SHIPPED | OUTPATIENT
Start: 2018-09-04 | End: 2019-08-06

## 2018-09-04 NOTE — TELEPHONE ENCOUNTER
"Requested Prescriptions   Pending Prescriptions Disp Refills         LISINOPRIL AND DULOXETINE ARE BOTH DUPLICATE. LAST FILLED ON 04/24/2018 FOR ONE YEAR SUPPLY      lisinopril (PRINIVIL,ZESTRIL) 30 MG tablet [Pharmacy Med Name: LISINOPRIL TAB 30MG] 90 tablet 3     Sig: TAKE ONE TABLET BY MOUTH   EVERY DAY    ACE Inhibitors (Including Combos) Protocol Failed    9/4/2018 12:11 PM       Failed - Blood pressure under 140/90 in past 12 months    BP Readings from Last 3 Encounters:   06/18/18 140/84   05/10/18 123/83   04/24/18 120/80                Passed - Recent (12 mo) or future (30 days) visit within the authorizing provider's specialty    Patient had office visit in the last 12 months or has a visit in the next 30 days with authorizing provider or within the authorizing provider's specialty.  See \"Patient Info\" tab in inbasket, or \"Choose Columns\" in Meds & Orders section of the refill encounter.           Passed - Patient is age 18 or older       Passed - No active pregnancy on record       Passed - Normal serum creatinine on file in past 12 months    Recent Labs   Lab Test  04/24/18   0908   CR  0.63            Passed - Normal serum potassium on file in past 12 months    Recent Labs   Lab Test  04/24/18   0908   POTASSIUM  4.2            Passed - No positive pregnancy test in past 12 months        DULoxetine (CYMBALTA) 20 MG EC capsule [Pharmacy Med Name: DULOXETINE CAP 20MG DR] 90 capsule 3     Sig: TAKE ONE CAPSULE BY MOUTH  EVERY DAY    Serotonin-Norepinephrine Reuptake Inhibitors  Failed    9/4/2018 12:11 PM       Failed - Blood pressure under 140/90 in past 12 months    BP Readings from Last 3 Encounters:   06/18/18 140/84   05/10/18 123/83   04/24/18 120/80                Failed - PHQ-9 score of less than 5 in past 6 months    Please review last PHQ-9 score.          Passed - Patient is age 18 or older       Passed - No active pregnancy on record       Passed - No positive pregnancy test in past 12 months " "      Passed - Recent (6 mo) or future (30 days) visit within the authorizing provider's specialty    Patient had office visit in the last 6 months or has a visit in the next 30 days with authorizing provider or within the authorizing provider's specialty.  See \"Patient Info\" tab in inbasket, or \"Choose Columns\" in Meds & Orders section of the refill encounter.                  fluticasone (FLONASE) 50 MCG/ACT spray [Pharmacy Med Name: FLUTICASONE  SPR [id number]] 48 g 3    Last Written Prescription Date:  08/28/2017  Last Fill Quantity: 48 grams,  # refills: 3   Last office visit: 6/18/2018 with prescribing provider:  06/18/2018   Future Office Visit:    ' Sig: SPRAY 2 SPRAYS INTO EACH   NOSTRIL DAILY.    Inhaled Steroids Protocol Passed    9/4/2018 12:11 PM       Passed - Patient is age 12 or older       Passed - Asthma control assessment score within normal limits in last 6 months    Please review ACT score.          Passed - Recent (6 mo) or future (30 days) visit within the authorizing provider's specialty    Patient had office visit in the last 6 months or has a visit in the next 30 days with authorizing provider or within the authorizing provider's specialty.  See \"Patient Info\" tab in inbasket, or \"Choose Columns\" in Meds & Orders section of the refill encounter.              Wili June XRT  "

## 2018-09-24 ENCOUNTER — OFFICE VISIT (OUTPATIENT)
Dept: FAMILY MEDICINE | Facility: CLINIC | Age: 63
End: 2018-09-24
Payer: COMMERCIAL

## 2018-09-24 ENCOUNTER — RADIANT APPOINTMENT (OUTPATIENT)
Dept: GENERAL RADIOLOGY | Facility: CLINIC | Age: 63
End: 2018-09-24
Attending: FAMILY MEDICINE
Payer: COMMERCIAL

## 2018-09-24 VITALS
RESPIRATION RATE: 12 BRPM | WEIGHT: 145.2 LBS | DIASTOLIC BLOOD PRESSURE: 79 MMHG | HEART RATE: 73 BPM | TEMPERATURE: 97.5 F | SYSTOLIC BLOOD PRESSURE: 133 MMHG | BODY MASS INDEX: 23.8 KG/M2

## 2018-09-24 DIAGNOSIS — S89.92XA KNEE INJURY, LEFT, INITIAL ENCOUNTER: Primary | ICD-10-CM

## 2018-09-24 DIAGNOSIS — S89.92XA KNEE INJURY, LEFT, INITIAL ENCOUNTER: ICD-10-CM

## 2018-09-24 PROCEDURE — 99214 OFFICE O/P EST MOD 30 MIN: CPT | Performed by: FAMILY MEDICINE

## 2018-09-24 PROCEDURE — 73562 X-RAY EXAM OF KNEE 3: CPT | Mod: LT

## 2018-09-24 ASSESSMENT — ANXIETY QUESTIONNAIRES
IF YOU CHECKED OFF ANY PROBLEMS ON THIS QUESTIONNAIRE, HOW DIFFICULT HAVE THESE PROBLEMS MADE IT FOR YOU TO DO YOUR WORK, TAKE CARE OF THINGS AT HOME, OR GET ALONG WITH OTHER PEOPLE: NOT DIFFICULT AT ALL
7. FEELING AFRAID AS IF SOMETHING AWFUL MIGHT HAPPEN: SEVERAL DAYS
5. BEING SO RESTLESS THAT IT IS HARD TO SIT STILL: NOT AT ALL
6. BECOMING EASILY ANNOYED OR IRRITABLE: SEVERAL DAYS
3. WORRYING TOO MUCH ABOUT DIFFERENT THINGS: SEVERAL DAYS
1. FEELING NERVOUS, ANXIOUS, OR ON EDGE: NOT AT ALL
2. NOT BEING ABLE TO STOP OR CONTROL WORRYING: SEVERAL DAYS
GAD7 TOTAL SCORE: 4

## 2018-09-24 ASSESSMENT — PATIENT HEALTH QUESTIONNAIRE - PHQ9: 5. POOR APPETITE OR OVEREATING: NOT AT ALL

## 2018-09-24 NOTE — MR AVS SNAPSHOT
After Visit Summary   9/24/2018    Breonna Caruso    MRN: [id number]           Patient Information     Date Of Birth          1955        Visit Information        Provider Department      9/24/2018 11:15 AM Naomi Ratliff MD [facility]        Today's Diagnoses     Knee injury, left, initial encounter    -  1      Care Instructions      Reducing Knee Pain and Swelling    Many treatments can help reduce pain and swelling in your knee. Your healthcare provider or physical therapist may suggest one or more of the following treatments:    Icing your knee helps reduce swelling. You may be asked to ice your knee once a day or more. Apply ice for about 15 to 20 minutes at a time, with at least 40 minutes between sessions. Always keep a towel between the ice and your skin.     Keeping your leg raised above your heart helps excess fluid flow out of your knee joint. This reduces swelling.    Compression means wrapping an elastic bandage or neoprene sleeve snugly around your knees. This keeps fluid from collecting in your knee joint.    Electrical stimulation, done by a physical therapist or , can help reduce excess fluid in your knee joint.    Anti-inflammatory medicines may be prescribed by your healthcare provider. You may take pills or receive injections in your knee.    Isometric (rosaura) exercises strengthen the muscles that support your knee joint. They also help reduce excess fluid in your knee.    Massage helps fluid drain away from your knee.  Date Last Reviewed: 10/13/2015    1997-9477 [employer]. [address] [place] [address]. All rights reserved. This information is not intended as a substitute for professional medical care. Always follow your healthcare professional's instructions.        Understanding Medial Collateral Ligament Sprain    The knee is a complex joint where the thighbone (femur) meets the shinbone  (tibia). Strong tissues called ligaments connect these bones together. Ligaments also keep the bones aligned, so the knee only bends how it is supposed to. The medial collateral ligament (MCL) runs across the knee joint on the medial side of the leg. Injury to this ligament may be very painful. The knee may also not work the way it should.  Causes of an MCL sprain  An MCL sprain often happens when the knee joint is pushed beyond its normal range of motion. It is most common during a blow to the knee from the outside, pushing the knee inward. It may also happen if the knee is forced into a twist. These movements stretch and tear the MCL. Other parts of the knee may be damaged along with the MCL.  Symptoms of an MCL sprain  These include:    Knee pain    Knee swelling    Locking of the joint    Wobbly or unstable feeling in the joint  Treatment for an MCL sprain  Treatment will depend on the severity of the sprain and whether there is damage to other parts of the knee. Options often include:    Rest. This allows the knee to heal. Activities that stress the knee should be avoided. Crutches, a knee brace, or both may also be recommended for a short time.    Cold packs and elevation of the knee. These help reduce swelling and relieve pain.    Compression. The knee may be wrapped with a bandage to help reduce swelling.    Medicines. These help relieve pain and swelling.    Exercises. These help improve the knee s stability, strength, and range of motion.  If the injury is severe or several parts of the joint are involved, surgery may be an option. Surgery repairs the MCL and any other damaged structures.     When to call your healthcare provider  Call your healthcare provider right away if you have any of these:    Pain, swelling, or instability that doesn t get better with treatment or gets worse    New symptoms   Date Last Reviewed: 3/10/2016    8670-7092 [employer]. [address], [place]  [address]. All rights reserved. This information is not intended as a substitute for professional medical care. Always follow your healthcare professional's instructions.                Follow-ups after your visit        Additional Services     [facility] PT, HAND, AND CHIROPRACTIC REFERRAL       Physical Therapy, Hand Therapy and Chiropractic Care are available through:  *[facility]  *Hand Therapy (Occupational Therapy or Physical Therapy)  *[facility]    Call one number to schedule at any of the above locations: [phone].    Physical therapy, Hand therapy and/or Chiropractic care has been recommended by your physician as an excellent treatment option to reduce pain and help people return to normal activities, including sports.  Therapy and/or chiropractic care services are a great complement or alternative to expensive and invasive surgery, injections, or long-term use of prescription medications. The primary goal is to identify the underlying problem and provide you the tools to manage your condition on your own.     Please be aware that coverage of these services is subject to the terms and limitations of your health insurance plan.  Call member services at your health plan with any benefit or coverage questions.      Please bring the following to your appointment:  *Your personal calendar for scheduling future appointments  *Comfortable clothing                  Follow-up notes from your care team     Return in about 2 weeks (around 10/8/2018).      Future tests that were ordered for you today     Open Future Orders        Priority Expected Expires Ordered    [facility] HAND, AND CHIR[facility]RACTIC REFERRAL Routine  9/24/2019 9/24/2018            Who to contact     If you have questions or need follow up information about today's clinic visit or your schedule please contact [facility] directly at [phone].  Normal or non-critical lab and imaging results  will be communicated to you by [employer], letter or phone within 4 business days after the clinic has received the results. If you do not hear from us within 7 days, please contact the clinic through [employer] or phone. If you have a critical or abnormal lab result, we will notify you by phone as soon as possible.  Submit refill requests through [employer] or call your pharmacy and they will forward the refill request to us. Please allow 3 business days for your refill to be completed.          Additional Information About Your Visit        [employer] Information     [employer] gives you secure access to your electronic health record. If you see a primary care provider, you can also send messages to your care team and make appointments. If you have questions, please call your primary care clinic.  If you do not have a primary care provider, please call [phone] and they will assist you.        Care EveryWhere ID     This is your Care EveryWhere ID. This could be used by other organizations to access your [place] medical records  [id number]        Your Vitals Were     Pulse Temperature Respirations Breastfeeding? BMI (Body Mass Index)       73 97.5  F (36.4  C) (Oral) 12 No 23.8 kg/m2        Blood Pressure from Last 3 Encounters:   09/24/18 133/79   06/18/18 140/84   05/10/18 123/83    Weight from Last 3 Encounters:   09/24/18 145 lb 3.2 oz (65.9 kg)   06/18/18 149 lb 11.2 oz (67.9 kg)   05/10/18 153 lb 6.4 oz (69.6 kg)                 Today's Medication Changes          These changes are accurate as of 9/24/18 12:03 PM.  If you have any questions, ask your nurse or doctor.               Start taking these medicines.        Dose/Directions    order for DME   Used for:  Knee injury, left, initial encounter   Started by:  Naomi Ratliff MD        Equipment being ordered: left knee brace   Quantity:  1 Package   Refills:  0            Where to get your medicines      Some of these will need a paper prescription  and others can be bought over the counter.  Ask your nurse if you have questions.     Bring a paper prescription for each of these medications     order for DME                Primary Care Provider Office Phone # Fax #    Ramona Ann Aaseby-Aguilera, PA-C [phone] [address]  [facility] [address]        Equal Access to Services     [facility] SIMEON : Hadii aad ku hadasho Soomaali, waaxda luqadaha, qaybta kaalmada adeegyada, waxay idiin hayaan adeeg kharash la'aabehzad . So [facility] [phone].    ATENCIÓN: Si habla español, tiene a lombardo disposición servicios gratuitos de asistencia lingüística. Llame al [phone].    We comply with applicable federal civil rights laws and [place] laws. We do not discriminate on the basis of race, color, national origin, age, disability, sex, sexual orientation, or gender identity.            Thank you!     Thank you for choosing [facility]  for your care. Our goal is always to provide you with excellent care. Hearing back from our patients is one way we can continue to improve our services. Please take a few minutes to complete the written survey that you may receive in the mail after your visit with us. Thank you!             Your Updated Medication List - Protect others around you: Learn how to safely use, store and throw away your medicines at www.disposemymeds.org.          This list is accurate as of 9/24/18 12:03 PM.  Always use your most recent med list.                   Brand Name Dispense Instructions for use Diagnosis    albuterol 108 (90 Base) MCG/ACT inhaler    PROAIR HFA/PROVENTIL HFA/VENTOLIN HFA    3 Inhaler    Inhale 2 puffs into the lungs every 6 hours as needed for shortness of breath / dyspnea or wheezing    Mild persistent asthma       benzonatate 200 MG capsule    TESSALON    21 capsule    Take 1 capsule (200 mg) by mouth 3 times daily as needed for cough    Cough, Acute suppurative otitis media of right ear without  spontaneous rupture of tympanic membrane, recurrence not specified       buPROPion 200 MG 12 hr tablet    WELLBUTRIN SR    180 tablet    Take 1 tablet (200 mg) by mouth 2 times daily    Decreased libido       DULoxetine 20 MG EC capsule    CYMBALTA    90 capsule    TAKE ONE CAPSULE BY MOUTH  EVERY DAY    Major depressive disorder, recurrent episode, mild (H)       esomeprazole 20 MG CR capsule    nexIUM    30 capsule    Take 1 capsule (20 mg) by mouth every morning (before breakfast) Take 30-60 minutes before eating.    GERD (gastroesophageal reflux disease)       estrogens (conjugated) 0.3 MG tablet    PREMARIN    90 tablet    Take 1 tablet (0.3 mg) by mouth daily    Decreased libido       estrogens-methylTESTOSTERone 1.25-2.5 MG per tablet    ESTRATEST    90 tablet    Take 1 tablet by mouth daily    Decreased libido       fexofenadine 180 MG tablet    ALLEGRA    90 tablet    Take 1 tablet by mouth daily.    Allergies       fluticasone 50 MCG/ACT spray    FLONASE    48 g    SPRAY 2 SPRAYS INTO EACH   NOSTRIL DAILY.    Rhinitis, allergic       lisinopril 30 MG tablet    PRINIVIL,ZESTRIL    90 tablet    TAKE ONE TABLET BY MOUTH   EVERY DAY    HTN, goal below 140/90       order for DME     1 Package    Equipment being ordered: left knee brace    Knee injury, left, initial encounter       simvastatin 40 MG tablet    ZOCOR    90 tablet    TAKE ONE TABLET BY MOUTH   EVERY NIGHT AT BEDTIME    Hyperlipidemia LDL goal <160       SUMAtriptan 5 MG/ACT nasal spray    IMITREX    1 Inhaler    Spray 1 spray (5 mg) in nostril as needed for migraine    Intractable chronic migraine without aura and without status migrainosus       traZODone 50 MG tablet    DESYREL    180 tablet    Take 1-2 tablets ( mg) by mouth nightly as needed for sleep    Persistent insomnia, Snoring       tretinoin 0.05 % cream    RETIN-A    45 g    Spread a pea size amount into affected area.  Use sunscreen SPF>20.    Acne

## 2018-09-24 NOTE — PROGRESS NOTES
"  SUBJECTIVE:   Breonna Caruso is a 63 year old female who presents to clinic today for the following health issues:      Joint Pain    Onset: 1 week ago    Description:   Location: left knee  Character: Sharp, Dull ache and Burning    Intensity: 8/10    Progression of Symptoms: worse, same    Accompanying Signs & Symptoms:  Other symptoms: swelling and \"feels unstable\"    History:   Previous similar pain: no       Precipitating factors:   Trauma or overuse: YES- \"twisted it\"    Alleviating factors:  Improved by: rest/inactivity, heat, massage and NSAID - OTC \"Bernardo Body and Back\"    Therapies Tried and outcome: OTC Bernardo, Body and Back    Twisted knee while going up ramp for cruise ship. She did not ice. Went for a hot stone massage over the knee which did help with pain some. Here today for further evaluation.           Problem list and histories reviewed & adjusted, as indicated.  Additional history: as documented    Patient Active Problem List   Diagnosis     GERD (gastroesophageal reflux disease)     Mild persistent asthma     Hyperlipidemia LDL goal <160     Migraine headache     Advanced directives, counseling/discussion     Family history of coronary artery disease     HTN, goal below 140/90     Major depressive disorder, recurrent episode, mild (HCC)     Past Surgical History:   Procedure Laterality Date     COLONOSCOPY N/A 7/10/2015    Procedure: COLONOSCOPY;  Surgeon: Saul Aguilar MD;  Location: [facility]     HYSTERECTOMY VAGINAL  1994    endometriosis     REPAIR PTOSIS BILATERAL  1/4/2013    Procedure: REPAIR PTOSIS BILATERAL;  BILATERAL UPPER LID MECHANICAL PTOSIS REPAIR;  Surgeon: Derek Allred MD;  Location:  [facility]     SLING BLADDER SUSPENSION WITH FASCIA KAYLYN  2004    bladder sling      SURGICAL HISTORY OF -   2003    rt shoulder surgery        Social History   Substance Use Topics     Smoking status: Never Smoker     Smokeless tobacco: Never Used     Alcohol use Yes      Comment: some " wine/beer     Family History   Problem Relation Age of Onset     Lipids Mother      HEART DISEASE Mother      pacemaker     HEART DISEASE Father      heart attacks-multiple ones     Lipids Father      diabetes after stroke      Cerebrovascular Disease Father      HEART DISEASE Brother      3 total, one wtih open heart surgery at age of 55     Diabetes Brother      one brother with dm, other one with glucose intolerance     Breast Cancer No family hx of      Cancer - colorectal No family hx of      Colon Cancer No family hx of            Reviewed and updated as needed this visit by clinical staff  Tobacco  Allergies  Meds  Med Hx  Surg Hx  Fam Hx  Soc Hx      Reviewed and updated as needed this visit by Provider         ROS:  Constitutional, msk  systems are negative, except as otherwise noted.    OBJECTIVE:     /79 (BP Location: Left arm, Patient Position: Chair, Cuff Size: Adult Regular)  Pulse 73  Temp 97.5  F (36.4  C) (Oral)  Resp 12  Wt 145 lb 3.2 oz (65.9 kg)  Breastfeeding? No  BMI 23.8 kg/m2  Body mass index is 23.8 kg/(m^2).  GENERAL: healthy, alert and no distress  MS: left knee- moderate swelling, antalgic gait, TTP medial aspect of knee, negative anterior and posterior drawer test, negative lachman     Diagnostic Test Results:  XR knee- I independently visualized the xray: mild effusion otherwise unremarkable. Will await official read     ASSESSMENT/PLAN:         1. Knee injury, left, initial encounter  - XR shows some effusion. Most of her tenderness is on the medial aspect of her knee- consistent with likely MCL strain. Patient to continue with RICE, will also refer to PT. Discussed with patient that if no improvement will need to obtain MRI. She is agreeable to plan.   - XR Knee Left 3 Views; Future  - order for DME; Equipment being ordered: left knee brace  Dispense: 1 Package; Refill: 0  - [facility] PT, HAND, AND CHIROPRACTIC REFERRAL; Future    See Patient Instructions    Naomi Bledsoe  MD G[facility]io  [facility]

## 2018-09-25 ASSESSMENT — ASTHMA QUESTIONNAIRES: ACT_TOTALSCORE: 25

## 2018-09-25 ASSESSMENT — ANXIETY QUESTIONNAIRES: GAD7 TOTAL SCORE: 4

## 2018-09-26 ENCOUNTER — THERAPY VISIT (OUTPATIENT)
Dept: PHYSICAL THERAPY | Facility: CLINIC | Age: 63
End: 2018-09-26
Attending: FAMILY MEDICINE
Payer: COMMERCIAL

## 2018-09-26 DIAGNOSIS — M25.562 LEFT KNEE PAIN: Primary | ICD-10-CM

## 2018-09-26 DIAGNOSIS — S89.92XA KNEE INJURY, LEFT, INITIAL ENCOUNTER: ICD-10-CM

## 2018-09-26 PROCEDURE — 97161 PT EVAL LOW COMPLEX 20 MIN: CPT | Mod: GP | Performed by: PHYSICAL THERAPIST

## 2018-09-26 PROCEDURE — 97110 THERAPEUTIC EXERCISES: CPT | Mod: GP | Performed by: PHYSICAL THERAPIST

## 2018-09-26 ASSESSMENT — ACTIVITIES OF DAILY LIVING (ADL)
GO UP STAIRS: ACTIVITY IS SOMEWHAT DIFFICULT
PAIN: THE SYMPTOM AFFECTS MY ACTIVITY MODERATELY
KNEEL ON THE FRONT OF YOUR KNEE: ACTIVITY IS VERY DIFFICULT
KNEE_ACTIVITY_OF_DAILY_LIVING_SCORE: 54.29
WEAKNESS: THE SYMPTOM AFFECTS MY ACTIVITY MODERATELY
RISE FROM A CHAIR: ACTIVITY IS MINIMALLY DIFFICULT
SIT WITH YOUR KNEE BENT: ACTIVITY IS MINIMALLY DIFFICULT
HOW_WOULD_YOU_RATE_THE_CURRENT_FUNCTION_OF_YOUR_KNEE_DURING_YOUR_USUAL_DAILY_ACTIVITIES_ON_A_SCALE_FROM_0_TO_100_WITH_100_BEING_YOUR_LEVEL_OF_KNEE_FUNCTION_PRIOR_TO_YOUR_INJURY_AND_0_BEING_THE_INABILITY_TO_PERFORM_ANY_OF_YOUR_USUAL_DAILY_ACTIVITIES?: 70
SWELLING: THE SYMPTOM AFFECTS MY ACTIVITY SLIGHTLY
AS_A_RESULT_OF_YOUR_KNEE_INJURY,_HOW_WOULD_YOU_RATE_YOUR_CURRENT_LEVEL_OF_DAILY_ACTIVITY?: ABNORMAL
STAND: ACTIVITY IS MINIMALLY DIFFICULT
RAW_SCORE: 38
HOW_WOULD_YOU_RATE_THE_OVERALL_FUNCTION_OF_YOUR_KNEE_DURING_YOUR_USUAL_DAILY_ACTIVITIES?: ABNORMAL
SQUAT: ACTIVITY IS VERY DIFFICULT
GO DOWN STAIRS: ACTIVITY IS SOMEWHAT DIFFICULT
GIVING WAY, BUCKLING OR SHIFTING OF KNEE: THE SYMPTOM AFFECTS MY ACTIVITY MODERATELY
STIFFNESS: THE SYMPTOM AFFECTS MY ACTIVITY SLIGHTLY
LIMPING: THE SYMPTOM AFFECTS MY ACTIVITY MODERATELY
WALK: ACTIVITY IS MINIMALLY DIFFICULT
KNEE_ACTIVITY_OF_DAILY_LIVING_SUM: 38

## 2018-09-26 NOTE — PROGRESS NOTES
[facility] Initial Evaluation  Subjective:  Patient is a 63 year old female presenting with rehab left knee hpi. The history is provided by the patient. No  was used.   Breonna Caruso is a 63 year old female with a left knee condition.  Condition occurred with:  A twist (and giving way).  Condition occurred: in the community.  This is a new condition  Patient twisted knee walking up a ramp on 9/16/2018.  Patient reports a gradual worsening knee pain and swelling following.  Patient c/o pain walking, going up/down stairs, transfers, and bending the knee.    Patient reports pain:  Medial.    Pain is described as burning and sharp and is intermittent and reported as 6/10.  Associated symptoms:  Loss of motion/stiffness and other (sensation of instability). Pain is the same all the time.  Symptoms are exacerbated by descending stairs, ascending stairs, kneeling, bending/squatting, transfers and walking and relieved by rest, bracing/immobilizing and NSAID's.  Since onset symptoms are gradually improving.  Special tests:  X-ray.  Previous treatment: none.    General health as reported by patient is good.  Pertinent medical history includes:  Asthma and high blood pressure.  Medical allergies: yes.  Other surgeries include:  Orthopedic surgery and other.  Current medications:  Anti-depressants, anti-inflammatory, high blood pressure medication and sleep medication.  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  Patient is working in normal job without restrictions.      Barriers include:  None as reported by the patient.    Red flags:  None as reported by the patient.                        Objective:  Standing Alignment:              Knee:  Normal      Gait:    Gait Type:  Antalgic   Assistive Devices:  Brace  Deviations:  Knee:  Knee extension decr L and knee flexion decr L                                                      Knee Evaluation:  ROM:    AROM      Extension: Left: 0     Right:   Flexion: Left: 105   Right:  PROM    Hyperextension: Left: 3   Right:     Flexion: Left: 120   Right:       Strength:     Extension:  Left: 5-/5    Pain:+/-        Flexion:  Left: 5-/5    Pain:+/-        Quad Set Left: Fair and delayed    Pain:     Ligament Testing:    Varus 0:  Left:  Neg     Varus 30:  Left:  Neg    Valgus 0:  Left:  Neg    Valgus 30:  Left:  Neg      Anterior Drawer:  Left:  Gr I and Pos        Lachman's:  Left:  Gr II and Pos    Special Tests:     Meniscal right knee: not assessed.  Palpation:    Left knee tenderness present at:  Medial Joint Line and Patellar Medial    Edema:  Edema of the knee: Mild.      Functional Testing:            Proprioception:   Stork Balance Test: Left:  15 seconds  Right:   % of Uninvolved:           General     ROS    Assessment/Plan:    Patient is a 63 year old female with left side knee complaints.    Patient has the following significant findings with corresponding treatment plan.                Diagnosis 1:  Knee strain - unable to r/o ACL involvement  Pain -  self management, education and home program  Decreased ROM/flexibility - therapeutic exercise, therapeutic activity and home program  Decreased strength - therapeutic exercise, therapeutic activities and home program  Edema - self management/home program  Impaired gait - gait training and home program  Impaired muscle performance - neuro re-education and home program  Decreased function - therapeutic activities and home program  Instability - therapeutic exercise, neuro re-education  Therapy Evaluation Codes:   1) History comprised of:   Personal factors that impact the plan of care:      None.    Comorbidity factors that impact the plan of care are:      Asthma and High blood pressure.     Medications impacting care: Anti-depressant, Anti-inflammatory, High blood pressure and Sleep.  2) Examination of Body Systems comprised of:   Body structures and functions that impact the plan of care:       Knee.   Activity limitations that impact the plan of care are:      Squatting/kneeling, Stairs, Standing, Walking and Transitional Movements.  3) Clinical presentation characteristics are:   Stable/Uncomplicated.  4) Decision-Making    Low complexity using standardized patient assessment instrument and/or measureable assessment of functional outcome.  Cumulative Therapy Evaluation is: Low complexity.    Previous and current functional limitations:  (See Goal Flow Sheet for this information)    Short term and Long term goals: (See Goal Flow Sheet for this information)     Communication ability:  Patient appears to be able to clearly communicate and understand verbal and written communication and follow directions correctly.  Treatment Explanation - The following has been discussed with the patient:   RX ordered/plan of care  Anticipated outcomes  Possible risks and side effects  This patient would benefit from PT intervention to resume normal activities.   Rehab potential is good.    Frequency:  1 X week, once daily  Duration:  for 6 weeks  Discharge Plan:  Achieve all LTG.  Independent in home treatment program.  Reach maximal therapeutic benefit.    Please refer to the daily flowsheet for treatment today, total treatment time and time spent performing 1:1 timed codes.

## 2018-10-10 ENCOUNTER — THERAPY VISIT (OUTPATIENT)
Dept: PHYSICAL THERAPY | Facility: CLINIC | Age: 63
End: 2018-10-10
Payer: COMMERCIAL

## 2018-10-10 DIAGNOSIS — M25.562 LEFT KNEE PAIN: ICD-10-CM

## 2018-10-10 PROCEDURE — 97112 NEUROMUSCULAR REEDUCATION: CPT | Mod: GP

## 2018-10-10 PROCEDURE — 97110 THERAPEUTIC EXERCISES: CPT | Mod: GP

## 2018-10-10 NOTE — MR AVS SNAPSHOT
After Visit Summary   10/10/2018    Breonna Caruso    MRN: [id number]           Patient Information     Date Of Birth          1955        Visit Information        Provider Department      10/10/2018 4:40 PM Traci Joshua [facility]        Today's Diagnoses     Left knee pain           Follow-ups after your visit        Your next 10 appointments already scheduled     Oct 17, 2018  4:40 PM CDT   MAKENNA Extremity with Traci Joshua [facility] ([facility])    [address] [facility] [address] [phone]            Oct 24, 2018  4:40 PM CDT   [facility]remity with [facility]    [address] [facility] [address] [phone]              Who to contact     If you have questions or need follow up information about today's clinic visit or your schedule please contact [facility] directly at [phone].  Normal or non-critical lab and imaging results will be communicated to you by ToyTalkhart, letter or phone within 4 business days after the clinic has received the results. If you do not hear from us within 7 days, please contact the clinic through [employer] or phone. If you have a critical or abnormal lab result, we will notify you by phone as soon as possible.  Submit refill requests through Vantrix or call your pharmacy and they will forward the refill request to us. Please allow 3 business days for your refill to be completed.          Additional Information About Your Visit        [employer] Information     Vantrix gives you secure access to your electronic health record. If you see a primary care provider, you can also send messages to your care team and make appointments. If you have questions, please call your primary care clinic.  If you do not have a primary care provider, please call [phone] and they will assist you.         Care EveryWhere ID     This is your Care EveryWhere ID. This could be used by other organizations to access your [place] medical records  [id number]         Blood Pressure from Last 3 Encounters:   09/24/18 133/79   06/18/18 140/84   05/10/18 123/83    Weight from Last 3 Encounters:   09/24/18 65.9 kg (145 lb 3.2 oz)   06/18/18 67.9 kg (149 lb 11.2 oz)   05/10/18 69.6 kg (153 lb 6.4 oz)              We Performed the Following     Hot or Cold Packs Therapy     Neuromuscular Re-Education     Therapeutic Exercises        Primary Care Provider Office Phone # Fax #    Ramona Ann Aaseby-Aguilera, PA-C [phone] [address]  [facility] [address]        Equal Access to Services     VALERIY HENDRIX : Hadii alejandra conn hadasho Soomaali, waaxda luqadaha, qaybta kaalmada adeegyada, leslie rain . So [facility] [phone].    ATENCIÓN: Si habla español, tiene a lombardo disposición servicios gratuitos de asistencia lingüística. Kumar al [phone].    We comply with applicable federal civil rights laws and [place] laws. We do not discriminate on the basis of race, color, national origin, age, disability, sex, sexual orientation, or gender identity.            Thank you!     Thank you for choosing [facility]  for your care. Our goal is always to provide you with excellent care. Hearing back from our patients is one way we can continue to improve our services. Please take a few minutes to complete the written survey that you may receive in the mail after your visit with us. Thank you!             Your Updated Medication List - Protect others around you: Learn how to safely use, store and throw away your medicines at www.disposemymeds.org.          This list is accurate as of 10/10/18  6:11 PM.  Always use your most recent med list.                   Brand Name Dispense Instructions for use Diagnosis    albuterol 108 (90 Base) MCG/ACT inhaler    PROAIR  HFA/PROVENTIL HFA/VENTOLIN HFA    3 Inhaler    Inhale 2 puffs into the lungs every 6 hours as needed for shortness of breath / dyspnea or wheezing    Mild persistent asthma       benzonatate 200 MG capsule    TESSALON    21 capsule    Take 1 capsule (200 mg) by mouth 3 times daily as needed for cough    Cough, Acute suppurative otitis media of right ear without spontaneous rupture of tympanic membrane, recurrence not specified       buPROPion 200 MG 12 hr tablet    WELLBUTRIN SR    180 tablet    Take 1 tablet (200 mg) by mouth 2 times daily    Decreased libido       DULoxetine 20 MG EC capsule    CYMBALTA    90 capsule    TAKE ONE CAPSULE BY MOUTH  EVERY DAY    Major depressive disorder, recurrent episode, mild (H)       esomeprazole 20 MG CR capsule    nexIUM    30 capsule    Take 1 capsule (20 mg) by mouth every morning (before breakfast) Take 30-60 minutes before eating.    GERD (gastroesophageal reflux disease)       estrogens (conjugated) 0.3 MG tablet    PREMARIN    90 tablet    Take 1 tablet (0.3 mg) by mouth daily    Decreased libido       estrogens-methylTESTOSTERone 1.25-2.5 MG per tablet    ESTRATEST    90 tablet    Take 1 tablet by mouth daily    Decreased libido       fexofenadine 180 MG tablet    ALLEGRA    90 tablet    Take 1 tablet by mouth daily.    Allergies       fluticasone 50 MCG/ACT spray    FLONASE    48 g    SPRAY 2 SPRAYS INTO EACH   NOSTRIL DAILY.    Rhinitis, allergic       lisinopril 30 MG tablet    PRINIVIL,ZESTRIL    90 tablet    TAKE ONE TABLET BY MOUTH   EVERY DAY    HTN, goal below 140/90       order for DME     1 Package    Equipment being ordered: left knee brace    Knee injury, left, initial encounter       simvastatin 40 MG tablet    ZOCOR    90 tablet    TAKE ONE TABLET BY MOUTH   EVERY NIGHT AT BEDTIME    Hyperlipidemia LDL goal <160       SUMAtriptan 5 MG/ACT nasal spray    IMITREX    1 Inhaler    Spray 1 spray (5 mg) in nostril as needed for migraine    Intractable chronic  migraine without aura and without status migrainosus       traZODone 50 MG tablet    DESYREL    180 tablet    Take 1-2 tablets ( mg) by mouth nightly as needed for sleep    Persistent insomnia, Snoring       tretinoin 0.05 % cream    RETIN-A    45 g    Spread a pea size amount into affected area.  Use sunscreen SPF>20.    Acne

## 2018-11-20 NOTE — PROGRESS NOTES
Subjective:  HPI                    Objective:  System    Physical Exam    General     ROS    Assessment/Plan:    DISCHARGE REPORT    Progress reporting period is from 9/26/2018 to 10/10/2018.       SUBJECTIVE  Subjective changes noted by patient:  States her knee is feeling less painful.  She is able to walk and perform stairs with minimal pain.  She does have pain with planting and twisting and sitting in low postitions.      Changes in function:  Yes (See Goal flowsheet attached for changes in current functional level)  Adverse reaction to treatment or activity: None    OBJECTIVE  Changes noted in objective findings:  Yes,   Objective: Knee flex A-128, P-135, good quad set, slight pain with funtional knee ext.  SL balance X 30 seconds with fair control.       ASSESSMENT/PLAN  Updated problem list and treatment plan: Diagnosis 1:  L knee pain  Pain -  hot/cold therapy  Decreased ROM/flexibility - manual therapy and therapeutic exercise  Decreased strength - therapeutic exercise and therapeutic activities  STG/LTGs have been met or progress has been made towards goals:  Yes (See Goal flow sheet completed today.)  Assessment of Progress: The patient's condition is improving.  Self Management Plans:  Patient has been instructed in a home treatment program.    Breonna continues to require the following intervention to meet STG and LTG's:  PT intervention is no longer required to meet STG/LTG.      Recommendations:  This patient is ready to be discharged from therapy and continue their home treatment program.    Please refer to the daily flowsheet for treatment today, total treatment time and time spent performing 1:1 timed codes.

## 2018-11-21 PROBLEM — M25.562 LEFT KNEE PAIN: Status: RESOLVED | Noted: 2018-09-26 | Resolved: 2018-11-21

## 2018-11-25 ENCOUNTER — OFFICE VISIT (OUTPATIENT)
Dept: URGENT CARE | Facility: URGENT CARE | Age: 63
End: 2018-11-25
Payer: COMMERCIAL

## 2018-11-25 VITALS
SYSTOLIC BLOOD PRESSURE: 122 MMHG | HEART RATE: 84 BPM | DIASTOLIC BLOOD PRESSURE: 84 MMHG | TEMPERATURE: 98.8 F | OXYGEN SATURATION: 96 %

## 2018-11-25 DIAGNOSIS — R07.0 THROAT PAIN: Primary | ICD-10-CM

## 2018-11-25 LAB
DEPRECATED S PYO AG THROAT QL EIA: NORMAL
SPECIMEN SOURCE: NORMAL

## 2018-11-25 PROCEDURE — 99213 OFFICE O/P EST LOW 20 MIN: CPT | Performed by: PHYSICIAN ASSISTANT

## 2018-11-25 PROCEDURE — 87880 STREP A ASSAY W/OPTIC: CPT | Performed by: PHYSICIAN ASSISTANT

## 2018-11-25 PROCEDURE — 87081 CULTURE SCREEN ONLY: CPT | Performed by: PHYSICIAN ASSISTANT

## 2018-11-25 ASSESSMENT — ENCOUNTER SYMPTOMS
NAUSEA: 0
VOMITING: 0
FEVER: 0
CHILLS: 0
SORE THROAT: 1
WHEEZING: 0
COUGH: 1
SHORTNESS OF BREATH: 0
DIARRHEA: 0

## 2018-11-25 NOTE — PROGRESS NOTES
SUBJECTIVE:   Breonna Caruso is a 63 year old female presenting with a chief complaint of   Chief Complaint   Patient presents with     Urgent Care     Pharyngitis     Possible strep x3 days- sore throat, cough, abdominal pain       She is an established patient of [facility].    URI Adult    Onset of symptoms was yesterday.  Course of illness is same.    Severity mild  Current and Associated symptoms: sore throat and slight cough  Treatment measures tried include None tried.  Predisposing factors include None.  Denies f/c/n/v/d.      Review of Systems   Constitutional: Negative for chills and fever.   HENT: Positive for sore throat.    Respiratory: Positive for cough. Negative for shortness of breath and wheezing.    Gastrointestinal: Negative for diarrhea, nausea and vomiting.       Past Medical History:   Diagnosis Date     Acne 3/28/2011     Decreased libido 10/14/2013     Dyslipidemia 11/15/2010     Family history of coronary artery disease 1/18/2012     GERD (gastroesophageal reflux disease) 11/15/2010     Gestational diabetes 4/22/2011     HTN, goal below 140/90 9/29/2014     Hyperlipidemia LDL goal <160 2/23/2011     Impaired fasting glucose 3/28/2011     Low HDL (under 40) 1/28/2013     Migraine headache 4/22/2011     (Problem list name updated by automated process. Provider to review and confirm.)     Mild major depression (H) 12/22/2010     Mild persistent asthma 2/23/2011     Symptomatic states associated with artificial menopause 1/18/2012     Family History   Problem Relation Age of Onset     Lipids Mother      HEART DISEASE Mother      pacemaker     HEART DISEASE Father      heart attacks-multiple ones     Lipids Father      diabetes after stroke      Cerebrovascular Disease Father      HEART DISEASE Brother      3 total, one wtih open heart surgery at age of 55     Diabetes Brother      one brother with dm, other one with glucose intolerance     Breast Cancer No family hx of      Cancer - colorectal  No family hx of      Colon Cancer No family hx of      Current Outpatient Prescriptions   Medication Sig Dispense Refill     albuterol (PROAIR HFA/PROVENTIL HFA/VENTOLIN HFA) 108 (90 Base) MCG/ACT Inhaler Inhale 2 puffs into the lungs every 6 hours as needed for shortness of breath / dyspnea or wheezing 3 Inhaler 0     buPROPion (WELLBUTRIN SR) 200 MG 12 hr tablet Take 1 tablet (200 mg) by mouth 2 times daily 180 tablet 3     DULoxetine (CYMBALTA) 20 MG EC capsule TAKE ONE CAPSULE BY MOUTH  EVERY DAY 90 capsule 1     esomeprazole (NEXIUM) 20 MG capsule Take 1 capsule (20 mg) by mouth every morning (before breakfast) Take 30-60 minutes before eating. 30 capsule 0     estrogens, conjugated, (PREMARIN) 0.3 MG tablet Take 1 tablet (0.3 mg) by mouth daily 90 tablet 3     estrogens-methylTESTOSTERone (ESTRATEST) 1.25-2.5 MG per tablet Take 1 tablet by mouth daily 90 tablet 3     fexofenadine (ALLEGRA) 180 MG tablet Take 1 tablet by mouth daily. 90 tablet 3     fluticasone (FLONASE) 50 MCG/ACT spray SPRAY 2 SPRAYS INTO EACH   NOSTRIL DAILY. 48 g 1     lisinopril (PRINIVIL,ZESTRIL) 30 MG tablet TAKE ONE TABLET BY MOUTH   EVERY DAY 90 tablet 1     order for DME Equipment being ordered: left knee brace 1 Package 0     simvastatin (ZOCOR) 40 MG tablet TAKE ONE TABLET BY MOUTH   EVERY NIGHT AT BEDTIME 90 tablet 3     SUMAtriptan (IMITREX) 5 MG/ACT nasal spray Spray 1 spray (5 mg) in nostril as needed for migraine 1 Inhaler 12     traZODone (DESYREL) 50 MG tablet Take 1-2 tablets ( mg) by mouth nightly as needed for sleep 180 tablet 1     tretinoin (RETIN-A) 0.05 % cream Spread a pea size amount into affected area.  Use sunscreen SPF>20. 45 g 11     Social History   Substance Use Topics     Smoking status: Never Smoker     Smokeless tobacco: Never Used     Alcohol use Yes      Comment: some wine/beer       OBJECTIVE  /84 (BP Location: Right arm, Patient Position: Chair, Cuff Size: Adult Regular)  Pulse 84  Temp  98.8  F (37.1  C) (Oral)  SpO2 96%    Physical Exam   Constitutional: She appears well-developed and well-nourished. No distress.   HENT:   Head: Normocephalic and atraumatic.   Right Ear: Tympanic membrane normal.   Left Ear: Tympanic membrane normal.   Mouth/Throat: Oropharynx is clear and moist.   Eyes: Conjunctivae are normal.   Neck: Normal range of motion.   Cardiovascular: Regular rhythm and normal heart sounds.    Pulmonary/Chest: Effort normal and breath sounds normal. No respiratory distress. She has no wheezes. She has no rales.   Neurological: She is alert.   Skin: Skin is warm and dry.   Psychiatric: She has a normal mood and affect.       Labs:  Results for orders placed or performed in visit on 11/25/18 (from the past 24 hour(s))   Rapid strep screen   Result Value Ref Range    Specimen Description Throat     Rapid Strep A Screen       NEGATIVE: No Group A streptococcal antigen detected by immunoassay, await culture report.           ASSESSMENT:      ICD-10-CM    1. Throat pain R07.0 Rapid strep screen        Medical Decision Making:    Differential Diagnosis:  URI Adult/Peds:  Strep pharyngitis, Viral pharyngitis, Viral syndrome and Viral upper respiratory illness    Serious Comorbid Conditions:  Adult:  Asthma    PLAN:    Acute pharyngitis: Rapid strep is negative today.  Throat culture is pending.  Supportive care measures advised.  We will communicate any positive finding on the throat culture result.  Follow-up if any worsening symptoms.  Patient understands and agrees with the plan.      Followup:    If not improving or if condition worsens, follow up with your Primary Care Provider

## 2018-11-25 NOTE — MR AVS SNAPSHOT
After Visit Summary   11/25/2018    Breonna Caruso    MRN: [id number]           Patient Information     Date Of Birth          1955        Visit Information        Provider Department      11/25/2018 10:05 AM Cynthia Augustine PA-C [facility]        Today's Diagnoses     Throat pain    -  1       Follow-ups after your visit        Who to contact     If you have questions or need follow up information about today's clinic visit or your schedule please contact [facility] directly at [phone].  Normal or non-critical lab and imaging results will be communicated to you by [employer] Software[employer], letter or phone within 4 business days after the clinic has received the results. If you do not hear from us within 7 days, please contact the clinic through SecureAutht or phone. If you have a critical or abnormal lab result, we will notify you by phone as soon as possible.  Submit refill requests through [employer] or call your pharmacy and they will forward the refill request to us. Please allow 3 business days for your refill to be completed.          Additional Information About Your Visit        MyChart Information     [employer] gives you secure access to your electronic health record. If you see a primary care provider, you can also send messages to your care team and make appointments. If you have questions, please call your primary care clinic.  If you do not have a primary care provider, please call [phone] and they will assist you.        Care EveryWhere ID     This is your Care EveryWhere ID. This could be used by other organizations to access your [place] medical records  [id number]        Your Vitals Were     Pulse Temperature Pulse Oximetry             84 98.8  F (37.1  C) (Oral) 96%          Blood Pressure from Last 3 Encounters:   11/25/18 122/84   09/24/18 133/79   06/18/18 140/84    Weight from Last 3 Encounters:   09/24/18 145 lb 3.2 oz (65.9 kg)   06/18/18  149 lb 11.2 oz (67.9 kg)   05/10/18 153 lb 6.4 oz (69.6 kg)              We Performed the Following     Rapid strep screen        Primary Care Provider Office Phone # Fax #    Ramona Ann Aaseby-Aguilera, PA-C [phone] [address]  [facility] [address]        Equal Access to Services     [facility] Center[facility] : Hadii aad ku hadasho Soomaali, waaxda luqadaha, qaybta kaalmada adeegyada, waxay idiin hayaan adeeg kharash la'aan . So [facility] [phone].    ATENCIÓN: Si saniyala espdmitriy, tiene a lombardo disposición servicios gratuitos de asistencia lingüística. Johnnyame al [phone].    We comply with applicable federal civil rights laws and [place] laws. We do not discriminate on the basis of race, color, national origin, age, disability, sex, sexual orientation, or gender identity.            Thank you!     Thank you for choosing [facility]  for your care. Our goal is always to provide you with excellent care. Hearing back from our patients is one way we can continue to improve our services. Please take a few minutes to complete the written survey that you may receive in the mail after your visit with us. Thank you!             Your Updated Medication List - Protect others around you: Learn how to safely use, store and throw away your medicines at www.disposemymeds.org.          This list is accurate as of 11/25/18 10:31 AM.  Always use your most recent med list.                   Brand Name Dispense Instructions for use Diagnosis    albuterol 108 (90 Base) MCG/ACT inhaler    PROAIR HFA/PROVENTIL HFA/VENTOLIN HFA    3 Inhaler    Inhale 2 puffs into the lungs every 6 hours as needed for shortness of breath / dyspnea or wheezing    Mild persistent asthma       buPROPion 200 MG 12 hr tablet    WELLBUTRIN SR    180 tablet    Take 1 tablet (200 mg) by mouth 2 times daily    Decreased libido       DULoxetine 20 MG capsule    CYMBALTA    90 capsule    TAKE ONE CAPSULE BY MOUTH  EVERY DAY    Major  depressive disorder, recurrent episode, mild (H)       esomeprazole 20 MG CR capsule    nexIUM    30 capsule    Take 1 capsule (20 mg) by mouth every morning (before breakfast) Take 30-60 minutes before eating.    GERD (gastroesophageal reflux disease)       estrogens (conjugated) 0.3 MG tablet    PREMARIN    90 tablet    Take 1 tablet (0.3 mg) by mouth daily    Decreased libido       estrogens-methylTESTOSTERone 1.25-2.5 MG per tablet    ESTRATEST    90 tablet    Take 1 tablet by mouth daily    Decreased libido       fexofenadine 180 MG tablet    ALLEGRA    90 tablet    Take 1 tablet by mouth daily.    Allergies       fluticasone 50 MCG/ACT spray    FLONASE    48 g    SPRAY 2 SPRAYS INTO EACH   NOSTRIL DAILY.    Rhinitis, allergic       lisinopril 30 MG tablet    PRINIVIL/ZESTRIL    90 tablet    TAKE ONE TABLET BY MOUTH   EVERY DAY    HTN, goal below 140/90       order for DME     1 Package    Equipment being ordered: left knee brace    Knee injury, left, initial encounter       simvastatin 40 MG tablet    ZOCOR    90 tablet    TAKE ONE TABLET BY MOUTH   EVERY NIGHT AT BEDTIME    Hyperlipidemia LDL goal <160       SUMAtriptan 5 MG/ACT nasal spray    IMITREX    1 Inhaler    Spray 1 spray (5 mg) in nostril as needed for migraine    Intractable chronic migraine without aura and without status migrainosus       traZODone 50 MG tablet    DESYREL    180 tablet    Take 1-2 tablets ( mg) by mouth nightly as needed for sleep    Persistent insomnia, Snoring       tretinoin 0.05 % cream    RETIN-A    45 g    Spread a pea size amount into affected area.  Use sunscreen SPF>20.    Acne

## 2018-11-26 LAB
BACTERIA SPEC CULT: NORMAL
SPECIMEN SOURCE: NORMAL

## 2018-12-22 ENCOUNTER — OFFICE VISIT (OUTPATIENT)
Dept: URGENT CARE | Facility: URGENT CARE | Age: 63
End: 2018-12-22
Payer: COMMERCIAL

## 2018-12-22 VITALS
TEMPERATURE: 98.2 F | HEIGHT: 66 IN | WEIGHT: 145 LBS | HEART RATE: 83 BPM | RESPIRATION RATE: 18 BRPM | SYSTOLIC BLOOD PRESSURE: 120 MMHG | BODY MASS INDEX: 23.3 KG/M2 | DIASTOLIC BLOOD PRESSURE: 78 MMHG | OXYGEN SATURATION: 94 %

## 2018-12-22 DIAGNOSIS — J45.31 MILD PERSISTENT ASTHMA WITH ACUTE EXACERBATION: Primary | ICD-10-CM

## 2018-12-22 PROCEDURE — 99214 OFFICE O/P EST MOD 30 MIN: CPT | Performed by: FAMILY MEDICINE

## 2018-12-22 RX ORDER — DOXYCYCLINE 100 MG/1
100 CAPSULE ORAL 2 TIMES DAILY
Qty: 20 CAPSULE | Refills: 0 | Status: SHIPPED | OUTPATIENT
Start: 2018-12-22 | End: 2019-01-01

## 2018-12-22 RX ORDER — BENZONATATE 200 MG/1
200 CAPSULE ORAL 3 TIMES DAILY PRN
Qty: 30 CAPSULE | Refills: 0 | Status: SHIPPED | OUTPATIENT
Start: 2018-12-22 | End: 2018-12-29

## 2018-12-22 RX ORDER — PREDNISONE 20 MG/1
40 TABLET ORAL DAILY
Qty: 10 TABLET | Refills: 0 | Status: SHIPPED | OUTPATIENT
Start: 2018-12-22 | End: 2018-12-27

## 2018-12-22 ASSESSMENT — MIFFLIN-ST. JEOR: SCORE: 1216.53

## 2018-12-22 NOTE — PATIENT INSTRUCTIONS
"  Patient Education     Asthma (Adult)  Asthma is a disease where the medium and  small air passages within the lung go into spasm and restrict the flow of air. Inflammation and swelling of the airways cause further blockage. During an acute asthma attack, these factors cause trouble breathing, wheezing, cough and chest tightness.    An asthma attack can be triggered by many things. Common triggers include infections such as the common cold, bronchitis, and pneumonia. Irritants such as smoke or pollutants in the air, very cold air, emotional upset, and exercise can also trigger an attack. In many adults with asthma, allergies to dust, mold, pollen and animal dander can cause an asthma attack. Skipping doses of daily asthma medicine can also bring on an asthma attack.  Asthma can be controlled using the proper medicines prescribed by your healthcare provider and avoiding exposure to known triggers including allergens and irritants.  Home care    Take prescribed medicine exactly at the times advised. If you need medicine such as from a hand held inhaler or aerosol breathing machine more than every 4 hours, contact your healthcare provider or seek immediate medical attention. If prescribed an antibiotic or prednisone, take all of the medicine as prescribed, even if you are feeling better after a few days.    Don't smoke. Avoid being exposed to the smoke of others.    Some people with asthma have worsening of their symptoms when they take aspirin and non-steroidal or fever-reducing medicines like ibuprofen and naproxen. Talk to your healthcare provider if you think this may apply to you.  Follow-up care  Follow up with your healthcare provider, or as advised. Always bring all of your current medicines to any appointments with your healthcare provider. Also bring a complete list of medicines even those not taken for asthma. If you don't already have one, talk to your healthcare provider about developing your own \"Asthma " "Action Plan.\"  A pneumococcal (pneumonia) vaccine and yearly flu shot (every fall) are recommended. Ask your doctor about this.  When to seek medical advice  Call your healthcare provider right away if any of these occur:     Increased wheezing or shortness of breath    Need to use your inhalers more often than usual without relief    Fever of 100.4 F (38 C) or higher, or as directed by your healthcare provider    Coughing up lots of dark-colored or bloody sputum (mucus)    Chest pain with each breath    If you use a peak flow meter as part of an Asthma Action Plan, and you are still in the yellow zone (50% to 80%) 15 minutes after using inhaler medicine.  Call 911  Call 911 if any of the following occur    Trouble walking or talking because of shortness of breath    If you use a peak flow meter as part of an Asthma Action Plan and you are still in the red zone (less than 50%) 15 minutes after using inhaler medicine    Lips or fingernails turning gray or blue  Date Last Reviewed: 5/1/2017 2000-2018 [employer]. [address] [place] [address], [place] [address]. All rights reserved. This information is not intended as a substitute for professional medical care. Always follow your healthcare professional's instructions.           "

## 2018-12-23 NOTE — PROGRESS NOTES
Chief Complaint   Patient presents with     Urgent Care     Cough     Follow up from last visit and still not better, now losing voice as well, congestion lowet in chest      Breonna DEVENDRA Caruso is a 63 year old female who presents for shortness of breath with wheezing that started 1 months ago.     A previous diagnosis of asthma was made concerning this patient on the basis of   symptoms and response to medications.   Current symptoms include wheezing, productive cough with sputum described as green, dyspnea on exertion and chest tightness.   Precipitating factors are uri infections and pollens, animal dander and dust.      Asthma treatment that is used daily/ chronically throughout the year ( Advair inhaler)  Additional treatments to control this asthma episode  ( proair inhaler about 4 x per day )       Past Medical History:   Diagnosis Date     Acne 3/28/2011     Decreased libido 10/14/2013     Dyslipidemia 11/15/2010     Family history of coronary artery disease 1/18/2012     GERD (gastroesophageal reflux disease) 11/15/2010     Gestational diabetes 4/22/2011     HTN, goal below 140/90 9/29/2014     Hyperlipidemia LDL goal <160 2/23/2011     Impaired fasting glucose 3/28/2011     Low HDL (under 40) 1/28/2013     Migraine headache 4/22/2011     (Problem list name updated by automated process. Provider to review and confirm.)     Mild major depression (H) 12/22/2010     Mild persistent asthma 2/23/2011     Symptomatic states associated with artificial menopause 1/18/2012     Patient Active Problem List   Diagnosis     GERD (gastroesophageal reflux disease)     Mild persistent asthma     Hyperlipidemia LDL goal <160     Migraine headache     Advanced directives, counseling/discussion     Family history of coronary artery disease     HTN, goal below 140/90     Major depressive disorder, recurrent episode, mild (HCC)       ALLERGIES:  Ciprofloxacin; Codeine sulfate; Lorcet [hydrocodone-acetaminophen]; Penicillin g; and  Sulfa drugs      Current Outpatient Medications on File Prior to Visit:  albuterol (PROAIR HFA/PROVENTIL HFA/VENTOLIN HFA) 108 (90 Base) MCG/ACT Inhaler Inhale 2 puffs into the lungs every 6 hours as needed for shortness of breath / dyspnea or wheezing   buPROPion (WELLBUTRIN SR) 200 MG 12 hr tablet Take 1 tablet (200 mg) by mouth 2 times daily   DULoxetine (CYMBALTA) 20 MG EC capsule TAKE ONE CAPSULE BY MOUTH  EVERY DAY   esomeprazole (NEXIUM) 20 MG capsule Take 1 capsule (20 mg) by mouth every morning (before breakfast) Take 30-60 minutes before eating.   estrogens, conjugated, (PREMARIN) 0.3 MG tablet Take 1 tablet (0.3 mg) by mouth daily   estrogens-methylTESTOSTERone (ESTRATEST) 1.25-2.5 MG per tablet Take 1 tablet by mouth daily   fexofenadine (ALLEGRA) 180 MG tablet Take 1 tablet by mouth daily.   fluticasone (FLONASE) 50 MCG/ACT spray SPRAY 2 SPRAYS INTO EACH   NOSTRIL DAILY.   lisinopril (PRINIVIL,ZESTRIL) 30 MG tablet TAKE ONE TABLET BY MOUTH   EVERY DAY   order for DME Equipment being ordered: left knee brace   simvastatin (ZOCOR) 40 MG tablet TAKE ONE TABLET BY MOUTH   EVERY NIGHT AT BEDTIME   SUMAtriptan (IMITREX) 5 MG/ACT nasal spray Spray 1 spray (5 mg) in nostril as needed for migraine   traZODone (DESYREL) 50 MG tablet Take 1-2 tablets ( mg) by mouth nightly as needed for sleep   tretinoin (RETIN-A) 0.05 % cream Spread a pea size amount into affected area.  Use sunscreen SPF>20.     No current facility-administered medications on file prior to visit.     Social History     Tobacco Use     Smoking status: Never Smoker     Smokeless tobacco: Never Used   Substance Use Topics     Alcohol use: Yes     Comment: some wine/beer       Family History   Problem Relation Age of Onset     Lipids Mother      Heart Disease Mother         pacemaker     Heart Disease Father         heart attacks-multiple ones     Lipids Father         diabetes after stroke      Cerebrovascular Disease Father      Heart  "Disease Brother         3 total, one wtih open heart surgery at age of 55     Diabetes Brother         one brother with dm, other one with glucose intolerance     Breast Cancer No family hx of      Cancer - colorectal No family hx of      Colon Cancer No family hx of      ROS:  CONSTITUTIONAL:NEGATIVE for fever, chills,    INTEGUMENTARY/SKIN: NEGATIVE for worrisome rashes,   EYES: NEGATIVE for vision changes or irritation  ENT/MOUTH: NEGATIVE for ear, mouth and throat problems  GI: NEGATIVE for nausea, abdominal pain,        OBJECTIVE: Initial physical exam  /78   Pulse 83   Temp 98.2  F (36.8  C) (Oral)   Resp 18   Ht 1.664 m (5' 5.5\")   Wt 65.8 kg (145 lb)   SpO2 94%   BMI 23.76 kg/m     GENERAL: alert, moderate distress, cooperative  SKIN: skin is clear, no rashes noted  HEAD: The head is normocephalic.   EYES: conjunctivae and cornea normal.without erythema or discharge  EARS: The canals are clear, tympanic membranes normal with no erythema/effusion.  NOSE: Clear, no discharge or congestion: THROAT: moist mucous membranes, no erythema.  NECK: The neck is supple, no masses or significant adenopathy noted  LUNGS: clear to auscultation, no rales, rhonchi, wheezing or retractions  CV: regular rate and rhythm. S1 and S2 are normal. No murmurs.     ASSESSMENT:  Mild persistent asthma with acute exacerbation      - doxycycline hyclate (VIBRAMYCIN) 100 MG capsule; Take 1 capsule (100 mg) by mouth 2 times daily for 10 days  - benzonatate (TESSALON) 200 MG capsule; Take 1 capsule (200 mg) by mouth 3 times daily as needed for cough  - predniSONE (DELTASONE) 20 MG tablet; Take 40 mg by mouth daily for 5 days.       Medication: continue current asthma medication regimen unchanged-  advair and albuterol- does not need refill        Discussed medication dosage, usage, side effects, and goals of   treatment in detail.     Avoidance of precipitants.    Return if worsening shortness of breath.    Follow-up with " primary physician for chronic management of asthma symptoms    Patient Education: Instructed to return to urgent care or notify primary MD office of fever >101, blood in sputum, chest pain, dyspnea at rest, or other symptoms of concern to patient.

## 2019-01-01 ENCOUNTER — APPOINTMENT (OUTPATIENT)
Dept: CT IMAGING | Facility: CLINIC | Age: 64
End: 2019-01-01
Attending: EMERGENCY MEDICINE
Payer: COMMERCIAL

## 2019-01-01 ENCOUNTER — HOSPITAL ENCOUNTER (EMERGENCY)
Facility: CLINIC | Age: 64
Discharge: HOME | End: 2019-01-01
Attending: EMERGENCY MEDICINE | Admitting: EMERGENCY MEDICINE
Payer: COMMERCIAL

## 2019-01-01 VITALS
OXYGEN SATURATION: 95 % | SYSTOLIC BLOOD PRESSURE: 117 MMHG | DIASTOLIC BLOOD PRESSURE: 67 MMHG | TEMPERATURE: 97.8 F | RESPIRATION RATE: 18 BRPM | HEIGHT: 65 IN | WEIGHT: 141 LBS | BODY MASS INDEX: 23.49 KG/M2 | HEART RATE: 76 BPM

## 2019-01-01 DIAGNOSIS — D73.5 SPLENIC INFARCT: ICD-10-CM

## 2019-01-01 LAB
ALBUMIN SERPL-MCNC: 3.6 G/DL (ref 3.4–5)
ALBUMIN UR-MCNC: NEGATIVE MG/DL
ALP SERPL-CCNC: 53 U/L (ref 40–150)
ALT SERPL W P-5'-P-CCNC: 29 U/L (ref 0–50)
ANION GAP SERPL CALCULATED.3IONS-SCNC: 5 MMOL/L (ref 3–14)
APPEARANCE UR: CLEAR
AST SERPL W P-5'-P-CCNC: 16 U/L (ref 0–45)
BACTERIA #/AREA URNS HPF: ABNORMAL /HPF
BASOPHILS # BLD AUTO: 0 10E9/L (ref 0–0.2)
BASOPHILS NFR BLD AUTO: 0.4 %
BILIRUB DIRECT SERPL-MCNC: 0.1 MG/DL (ref 0–0.2)
BILIRUB SERPL-MCNC: 0.4 MG/DL (ref 0.2–1.3)
BILIRUB UR QL STRIP: NEGATIVE
BUN SERPL-MCNC: 19 MG/DL (ref 7–30)
CALCIUM SERPL-MCNC: 8.6 MG/DL (ref 8.5–10.1)
CHLORIDE SERPL-SCNC: 104 MMOL/L (ref 94–109)
CO2 SERPL-SCNC: 30 MMOL/L (ref 20–32)
COLOR UR AUTO: YELLOW
CREAT SERPL-MCNC: 0.86 MG/DL (ref 0.52–1.04)
DIFFERENTIAL METHOD BLD: NORMAL
EOSINOPHIL # BLD AUTO: 0 10E9/L (ref 0–0.7)
EOSINOPHIL NFR BLD AUTO: 0.4 %
ERYTHROCYTE [DISTWIDTH] IN BLOOD BY AUTOMATED COUNT: 12.4 % (ref 10–15)
GFR SERPL CREATININE-BSD FRML MDRD: 71 ML/MIN/{1.73_M2}
GLUCOSE SERPL-MCNC: 88 MG/DL (ref 70–99)
GLUCOSE UR STRIP-MCNC: NEGATIVE MG/DL
HCT VFR BLD AUTO: 42.7 % (ref 35–47)
HGB BLD-MCNC: 14 G/DL (ref 11.7–15.7)
HGB UR QL STRIP: NEGATIVE
IMM GRANULOCYTES # BLD: 0 10E9/L (ref 0–0.4)
IMM GRANULOCYTES NFR BLD: 0.5 %
KETONES UR STRIP-MCNC: NEGATIVE MG/DL
LDH SERPL L TO P-CCNC: 185 U/L (ref 81–234)
LEUKOCYTE ESTERASE UR QL STRIP: NEGATIVE
LYMPHOCYTES # BLD AUTO: 1.2 10E9/L (ref 0.8–5.3)
LYMPHOCYTES NFR BLD AUTO: 15.2 %
MCH RBC QN AUTO: 31 PG (ref 26.5–33)
MCHC RBC AUTO-ENTMCNC: 32.8 G/DL (ref 31.5–36.5)
MCV RBC AUTO: 95 FL (ref 78–100)
MONOCYTES # BLD AUTO: 0.6 10E9/L (ref 0–1.3)
MONOCYTES NFR BLD AUTO: 7 %
MUCOUS THREADS #/AREA URNS LPF: PRESENT /LPF
NEUTROPHILS # BLD AUTO: 6.2 10E9/L (ref 1.6–8.3)
NEUTROPHILS NFR BLD AUTO: 76.5 %
NITRATE UR QL: NEGATIVE
NRBC # BLD AUTO: 0 10*3/UL
NRBC BLD AUTO-RTO: 0 /100
PH UR STRIP: 7 PH (ref 5–7)
PLATELET # BLD AUTO: 244 10E9/L (ref 150–450)
POTASSIUM SERPL-SCNC: 3.8 MMOL/L (ref 3.4–5.3)
PROT SERPL-MCNC: 6.3 G/DL (ref 6.8–8.8)
RBC # BLD AUTO: 4.51 10E12/L (ref 3.8–5.2)
RBC #/AREA URNS AUTO: 1 /HPF (ref 0–2)
SODIUM SERPL-SCNC: 139 MMOL/L (ref 133–144)
SOURCE: ABNORMAL
SP GR UR STRIP: 1.02 (ref 1–1.03)
SQUAMOUS #/AREA URNS AUTO: 1 /HPF (ref 0–1)
TROPONIN I SERPL-MCNC: <0.015 UG/L (ref 0–0.04)
UROBILINOGEN UR STRIP-MCNC: 0 MG/DL (ref 0–2)
WBC # BLD AUTO: 8.2 10E9/L (ref 4–11)
WBC #/AREA URNS AUTO: 1 /HPF (ref 0–5)

## 2019-01-01 PROCEDURE — 25000128 H RX IP 250 OP 636: Performed by: EMERGENCY MEDICINE

## 2019-01-01 PROCEDURE — 99207 ZZC CDG-CODE CATEGORY CHANGED: CPT | Performed by: INTERNAL MEDICINE

## 2019-01-01 PROCEDURE — 85025 COMPLETE CBC W/AUTO DIFF WBC: CPT | Performed by: EMERGENCY MEDICINE

## 2019-01-01 PROCEDURE — 81001 URINALYSIS AUTO W/SCOPE: CPT | Performed by: EMERGENCY MEDICINE

## 2019-01-01 PROCEDURE — 84484 ASSAY OF TROPONIN QUANT: CPT | Performed by: EMERGENCY MEDICINE

## 2019-01-01 PROCEDURE — 93005 ELECTROCARDIOGRAM TRACING: CPT

## 2019-01-01 PROCEDURE — 80076 HEPATIC FUNCTION PANEL: CPT | Performed by: EMERGENCY MEDICINE

## 2019-01-01 PROCEDURE — 99204 OFFICE O/P NEW MOD 45 MIN: CPT | Performed by: INTERNAL MEDICINE

## 2019-01-01 PROCEDURE — 74177 CT ABD & PELVIS W/CONTRAST: CPT

## 2019-01-01 PROCEDURE — 99285 EMERGENCY DEPT VISIT HI MDM: CPT | Mod: 25

## 2019-01-01 PROCEDURE — 83615 LACTATE (LD) (LDH) ENZYME: CPT | Performed by: EMERGENCY MEDICINE

## 2019-01-01 PROCEDURE — 80048 BASIC METABOLIC PNL TOTAL CA: CPT | Performed by: EMERGENCY MEDICINE

## 2019-01-01 PROCEDURE — 71260 CT THORAX DX C+: CPT

## 2019-01-01 RX ORDER — IOPAMIDOL 755 MG/ML
500 INJECTION, SOLUTION INTRAVASCULAR ONCE
Status: COMPLETED | OUTPATIENT
Start: 2019-01-01 | End: 2019-01-01

## 2019-01-01 RX ADMIN — IOPAMIDOL 85 ML: 755 INJECTION, SOLUTION INTRAVENOUS at 13:57

## 2019-01-01 RX ADMIN — SODIUM CHLORIDE 97 ML: 9 INJECTION, SOLUTION INTRAVENOUS at 13:57

## 2019-01-01 ASSESSMENT — ENCOUNTER SYMPTOMS
VOMITING: 1
FEVER: 0
NAUSEA: 1
DIARRHEA: 0
HEMATURIA: 0
ABDOMINAL PAIN: 1
DYSURIA: 0
CONSTIPATION: 0

## 2019-01-01 ASSESSMENT — MIFFLIN-ST. JEOR: SCORE: 1190.45

## 2019-01-01 NOTE — ED PROVIDER NOTES
History     Chief Complaint:  Abdominal Pain    HPI   Breonna Caruso is a 63 year old female who presents to the emergency department for evaluation of abdominal pain. The patient reports left upper abdominal pain and left lower chest discomfort and shortness of breath. Her symptoms began this morning before she got out of bed. Patient reports nausea and one episode of vomiting which she reports was clear because she had not eaten anything yet. Her pain is constant and does not seem to improve with any interventions she has tried at home. Patient rates her pain as 7-8/10. She denies fever, diarrhea, hematuria, dysuria, diarrhea, constipation.     Allergies:  Ciprofloxacin  Codeine Sulfate  Lorcet [Hydrocodone-Acetaminophen]  Penicillin G  Sulfa Drugs     Medications:    albuterol (PROAIR HFA/PROVENTIL HFA/VENTOLIN HFA) 108 (90 Base) MCG/ACT Inhaler  buPROPion (WELLBUTRIN SR) 200 MG 12 hr tablet  doxycycline hyclate (VIBRAMYCIN) 100 MG capsule  DULoxetine (CYMBALTA) 20 MG EC capsule  esomeprazole (NEXIUM) 20 MG capsule  estrogens, conjugated, (PREMARIN) 0.3 MG tablet  estrogens-methylTESTOSTERone (ESTRATEST) 1.25-2.5 MG per tablet  fexofenadine (ALLEGRA) 180 MG tablet  fluticasone (FLONASE) 50 MCG/ACT spray  lisinopril (PRINIVIL,ZESTRIL) 30 MG tablet  simvastatin (ZOCOR) 40 MG tablet  SUMAtriptan (IMITREX) 5 MG/ACT nasal spray  traZODone (DESYREL) 50 MG tablet  tretinoin (RETIN-A) 0.05 % cream     Past Medical History:    Acne  Decreased libido   Dyslipidemia   GERD (gastroesophageal reflux disease)   Gestational diabetes  Hypertension   Hyperlipidemia  Impaired fasting glucose  Low HDL (under 40)  Migraine headache   Mild major depression (H)  Mild persistent asthma  Symptomatic states associated with artificial menopause    Past Surgical History:    Colonoscopy  Endometriosis  Repair ptosis bilateral  Bladder sling   Right shoulder surgery    Family History:    Mother- heart disease  Father- MI, diabetes, CV  "disease  Brother- MI, diabetes    Social History:  Marital Status:   [2]  Social History     Tobacco Use     Smoking status: Never Smoker     Smokeless tobacco: Never Used   Substance Use Topics     Alcohol use: Yes     Comment: some wine/beer     Drug use: No        Review of Systems   Constitutional: Negative for fever.   Cardiovascular: Positive for chest pain.   Gastrointestinal: Positive for abdominal pain, nausea and vomiting. Negative for constipation and diarrhea.   Genitourinary: Negative for dysuria and hematuria.   All other systems reviewed and are negative.        Physical Exam   First Vitals:  BP: 156/88  Pulse: 76  Temp: 97.8  F (36.6  C)  Resp: 18  Height: 165.1 cm (5' 5\")  Weight: 64 kg (141 lb)  SpO2: 95 %      Physical Exam  General: Patient is alert and cooperative.  HENT:  Normal nose, oropharynx. Moist oral mucosa.  Eyes: EOMI. Normal conjunctiva.  Neck:  Normal range of motion and appearance.   Cardiovascular:  Normal rate, regular rhythm and normal heart sounds.   Pulmonary/Chest:  Effort normal. No wheezing or crackles.  Abdominal: Soft. No distension or mass.  Tender LUQ.   Musculoskeletal: Normal range of motion. No edema or tenderness.   Neurological: oriented, normal strength, sensation, and coordination.   Skin: Warm and dry. No rash or bruising.   Psychiatric: Normal mood and affect. Normal behavior and judgement.      Emergency Department Course     ECG:  ECG taken at 1241, ECG read at 1245  Normal sinus rhythm  Minimal voltage criteria for LVH, may be normal variant  Normal ECG  Rate 73 bpm. NC interval 138 ms. QRS duration 76 ms. QT/QTc 400/440 ms. P-R-T axes 41 -15 30.    Imaging:  Radiology findings were communicated with the patient who voiced understanding of the findings.    CT Chest (PE) Abdomen Pelvis w Contrast  Preliminary Result  IMPRESSION:  1. No evidence for pulmonary embolism or thoracic/abdominal aortic  aneurysm or dissection.  2. Interval development of a " bandlike area of decreased contrast  enhancement in the mid spleen and a smaller focus of decreased density  in the anterior spleen. These may represent regions of splenic infarct  of uncertain exact age but new since 5/21/2014.  3. Distal colonic diverticulosis.  Readings are preliminary at time of note     Laboratory:  Laboratory findings were communicated with the patient who voiced understanding of the findings.    UA: few bacteria (A), mucous present (A)  Troponin (Collected 1227): <0.015   CBC: WBC 8.2, HGB 14,   BMP: o/w WNL (Creatinine 0.86)    Interventions:  NS 1L IV Bolus     Emergency Department Course:  Nursing notes and vitals reviewed.  I performed an exam of the patient as documented above.   Spoke with Dr. Muir who felt that admission to hospitalist service was reasonable course of action.  I discussed the treatment plan with the patient. They expressed understanding of this plan and consented to admission. The patient will be admitted to a monitored bed for further evaluation and treatment.     I personally reviewed the imaging and lab results with the Patient and answered all related questions prior to admission for observation.  Impression & Plan      Medical Decision Making:  Afebrile 63-year-old female arrives with complaints of acute left upper quadrant abdominal pain, chest pain and shortness of breath.  She is hemodynamically stable but uncomfortable appearing initially with a tender left upper quadrant of the abdomen.  Workup has included a CT scan of the chest abdomen and pelvis which identifies abnormalities of the spleen which are felt by radiology to represent regions of splenic infarct of uncertain age.  There are no other acute abnormalities described in her laboratory tests including a CBC, CMP, and troponin are normal.  She has a negative urinalysis and an EKG depicting a sinus rhythm with no ischemic changes.  Etiology and significance of the CT findings is undetermined  but there is no other radiographic explanation for her symptoms which do correlate to this area.  I conferred with our hospitalist colleagues who have consulted on patient and discussed with hematology.  Given now resolution of symptoms and normal examination/vitals, they have suggested discontinuing estrogen and beginning Eliquis, felt to be appropriate for early outpatient follow up for eventual hypercoagulable work up.   Diagnosis:    ICD-10-CM    1. 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 Ask your doctor about these medicines      Dose / Directions   albuterol 108 (90 Base) MCG/ACT inhaler  Commonly known as:  PROAIR HFA/PROVENTIL HFA/VENTOLIN HFA  Used for:  Mild persistent asthma      Dose:  2 puff  Inhale 2 puffs into the lungs every 6 hours as needed for shortness of breath / dyspnea or wheezing  Quantity:  3 Inhaler  Refills:  0     buPROPion 200 MG 12 hr tablet  Commonly known as:  WELLBUTRIN SR  Used for:  Decreased libido      Dose:  200 mg  Take 1 tablet (200 mg) by mouth 2 times daily  Quantity:  180 tablet  Refills:  3     doxycycline hyclate 100 MG capsule  Commonly known as:  VIBRAMYCIN  Used for:  Mild persistent asthma with acute exacerbation      Dose:  100 mg  Take 1 capsule (100 mg) by mouth 2 times daily for 10 days  Quantity:  20 capsule  Refills:  0     DULoxetine 20 MG capsule  Commonly known as:  CYMBALTA  Used for:  Major depressive disorder, recurrent episode, mild (H)      TAKE ONE CAPSULE BY MOUTH  EVERY DAY  Quantity:  90 capsule  Refills:  1     esomeprazole 20 MG DR capsule  Commonly known as:  nexIUM  Used for:  GERD (gastroesophageal reflux disease)      Dose:  20 mg  Take 1 capsule (20 mg) by mouth every morning (before breakfast) Take 30-60 minutes before eating.  Quantity:  30 capsule  Refills:  0     estrogen conj 0.3 MG tablet  Commonly known as:  PREMARIN  Used for:  Decreased  libido      Dose:  0.3 mg  Take 1 tablet (0.3 mg) by mouth daily  Quantity:  90 tablet  Refills:  3     estrogens-methylTESTOSTERone 1.25-2.5 MG per tablet  Commonly known as:  ESTRATEST  Used for:  Decreased libido      Dose:  1 tablet  Take 1 tablet by mouth daily  Quantity:  90 tablet  Refills:  3     fexofenadine 180 MG tablet  Commonly known as:  ALLEGRA  Used for:  Allergies      Dose:  180 mg  Take 1 tablet by mouth daily.  Quantity:  90 tablet  Refills:  3     fluticasone 50 MCG/ACT nasal spray  Commonly known as:  FLONASE  Used for:  Rhinitis, allergic      SPRAY 2 SPRAYS INTO EACH   NOSTRIL DAILY.  Quantity:  48 g  Refills:  1     lisinopril 30 MG tablet  Commonly known as:  PRINIVIL/ZESTRIL  Used for:  HTN, goal below 140/90      TAKE ONE TABLET BY MOUTH   EVERY DAY  Quantity:  90 tablet  Refills:  1     simvastatin 40 MG tablet  Commonly known as:  ZOCOR  Used for:  Hyperlipidemia LDL goal <160      TAKE ONE TABLET BY MOUTH   EVERY NIGHT AT BEDTIME  Quantity:  90 tablet  Refills:  3     SUMAtriptan 5 MG/ACT nasal spray  Commonly known as:  IMITREX  Used for:  Intractable chronic migraine without aura and without status migrainosus      Dose:  1 spray  Spray 1 spray (5 mg) in nostril as needed for migraine  Quantity:  1 Inhaler  Refills:  12     traZODone 50 MG tablet  Commonly known as:  DESYREL  Used for:  Persistent insomnia, Snoring      Dose:   mg  Take 1-2 tablets ( mg) by mouth nightly as needed for sleep  Quantity:  180 tablet  Refills:  1     tretinoin 0.05 % external cream  Commonly known as:  RETIN-A  Used for:  Acne      Spread a pea size amount into affected area.  Use sunscreen SPF>20.  Quantity:  45 g  Refills:  11        CONTINUE these medicines which have NOT CHANGED      Dose / Directions   order for DME  Used for:  Knee injury, left, initial encounter      Equipment being ordered: left knee brace  Quantity:  1 Package  Refills:  0            Scribe Disclosure:  Deshaun LARIOS  Agatha, am serving as a scribe at 1:13 PM on 1/1/2019 to document services personally performed by Lv Nguyen MD, based on my observations and the provider's statements to me.   [facility] EMERGENCY DEPARTMENT       Lv Nguyen MD  01/02/19 [id number]

## 2019-01-01 NOTE — CONSULTS
[facility] Internal Medicine ED Consultation    Breonna Caruso MRN# [id number]   Age: 63 year old YOB: 1955   Date of Admission: 1/1/2019     Reason for consult: I was asked to provide a one-time consult on this patient       Requesting physician Lv Nguyen MD       Level of consult: One-time consult to assist in determining a diagnosis and to recommend an appropriate treatment plan           Assessment and Plan:   Assessment:   Breonna Caruso is a 63 year old woman with PMH notable for estrogen treatment for post-menopausal symptoms who came to the ED today with complaint of abrupt onset of Left Upper Quadrant abd pain. On ROS she has no significant added positive findings other than quite non-specific drenching night sweats in the past 2 weeks.  She also notes that she has had about 8 lbs weight loss which has come with some degree of dieting.    Dx:  LUQ pain with poss Splenic Infarct identified on CT Chest.  Pt is on estrogen for post-menopausal symptoms and this is likely the cause of suspected thrombophilia. I spoke with Dr. Rock from [facility] and he agreed, noting that pt could be managed as an outpatientt, stopping Estrogen and starting Xarelto at 20 mg daily (leaving out the initial higher dose for PE management).       Plan:   1.  OK to discharge home.   2.  Stop Estrogens.   3.  Start Xarelto 20 mg daily, disp 1 month.   4.  Follow up with PMD ASAP and consider referral to [facility].              Chief Complaint:   LUQ pain.     History is obtained from the patient, ED physician and EMR.     Ms. Caruso reports that she was feeling completely fine yesterday when she went to bed.  She did not do anything unusual but when she awakened this morning she had left upper quadrant abdominal pain.  This initially was moderate discomfort but became quite intense and with that she had an episode of apparently nonbloody vomiting.    When she first arrived in the emergency department, the  "patient reported persistent and constant left upper quadrant pain was rated 7-8/10.  I was asked to see the patient for possible admission.    At the time of my evaluation in the emergency department, the patient denies any discomfort at all.  I spoke with Dr. Nguyen as well who was not completely clear about whether admission was appropriate.    After my evaluation, I spoke with Dr. Heath Rock from [facility] in regard to this patient and my assessment.  As noted above, he concurred that in the absence of ongoing symptoms, the patient could be discharged home.  He also agreed with cessation of estrogen but added that it would be reasonable for the patient to be put on a \"low-dose\" of Xarelto in an effort to mitigate risk of thromboembolism from residual clot that we have not appreciated.      I note that the patient does endorse some drenching sweats over the last couple of weeks.  She is unable to tell me whether this is similar to her previous perimenopausal symptoms or if it is due to having too many covers on her bed.  Her , who is present at the bedside in the emergency department, indicates that the patient does seem to bile on excessive amounts of blankets.  Patient is not sure why but she does feel that she is sweating excessively in the night times.  She also has lost about 8 pounds in the course of the last 8-10 months.  She states that she has been dieting by replacing one meal per day with a diet supplement.    Otherwise, patient denies any change in exercise tolerance or activity level.  Specifically she denies bloody nose, hematemesis, melena.  I did discuss with her the potential risks of Xarelto in the presence of her  they both seem to understand.          Past Medical History:     Past Medical History:   Diagnosis Date     Decreased libido 10/14/2013     Dyslipidemia 11/15/2010     Family history of coronary artery disease 1/18/2012     GERD (gastroesophageal reflux " disease) 11/15/2010     Gestational diabetes 4/22/2011     HTN, goal below 140/90 9/29/2014     Hyperlipidemia LDL goal <160 2/23/2011     Impaired fasting glucose 3/28/2011     Low HDL (under 40) 1/28/2013     Migraine headache 4/22/2011     Mild major depression (H) 12/22/2010     Symptomatic states associated with artificial menopause 1/18/2012             Past Surgical History:     Past Surgical History:   Procedure Laterality Date     COLONOSCOPY N/A 7/10/2015    Procedure: COLONOSCOPY;  Surgeon: Saul Aguilar MD;  Location: [facility]     HYSTERECTOMY VAGINAL  1994    endometriosis     REPAIR PTOSIS BILATERAL  1/4/2013    Procedure: REPAIR PTOSIS BILATERAL;  BILATERAL UPPER LID MECHANICAL PTOSIS REPAIR;  Surgeon: Derek Allred MD;  Location:  [facility]     SLING BLADDER SUSPENSION WITH FASCIA KAYLYN  2004    bladder sling      SURGICAL HISTORY OF -   2003    rt shoulder surgery              Social History:     Social History     Tobacco Use     Smoking status: Never Smoker     Smokeless tobacco: Never Used   Substance Use Topics     Alcohol use: Yes     Comment: some wine/beer             Family History:     Family History   Problem Relation Age of Onset     Lipids Mother      Heart Disease Mother         pacemaker     Heart Disease Father         heart attacks-multiple ones     Lipids Father         diabetes after stroke      Cerebrovascular Disease Father      Heart Disease Brother         3 total, one wtih open heart surgery at age of 55     Diabetes Brother         one brother with dm, other one with glucose intolerance     Breast Cancer No family hx of      Cancer - colorectal No family hx of      Colon Cancer No family hx of      Family history notable for hx VTE. Mothe with rhythm disturbance of unclear character.          Allergies:     Allergies   Allergen Reactions     Ciprofloxacin Swelling     Facial swelling     Codeine Sulfate      rash     Lorcet [Hydrocodone-Acetaminophen]      Penicillin G       Rash, hives     Sulfa Drugs      Rash, hives             Medications:   Bupropion 12-hour tablet 200 mg  Esomeprazole 20 mg daily  Conjugated estrogen 0.3 mg daily  Lisinopril 30 mg daily  Simvastatin 40 mg daily          Review of Systems:   A comprehensive review of systems was performed and found to be negative except as described in this note         Physical Exam:   Vitals were reviewed  Temp: 97.8  F (36.6  C) Temp src: Oral BP: 149/89 Pulse: 69   Resp: 18 SpO2: 94 % O2 Device: None (Room air)    Constitutional: Awake, alert, cooperative, no apparent distress, and appears stated age.  Eyes: Lids and lashes normal, pupils equal, round and reactive to light, extra ocular muscles intact, sclera clear, conjunctiva normal.  ENT: Normocephalic, without obvious abnormality, atraumatic, oral pharynx with moist mucus membranes, tonsils without erythema or exudates, gums normal and good dentition.  Neck: Supple, symmetrical, trachea midline, no adenopathy, thyroid symmetric, not enlarged and no tenderness, skin normal.  Hematologic / Lymphatic: No cervical lymphadenopathy and no supraclavicular lymphadenopathy.  Back: Symmetric, no curvature, spinous processes are non-tender on palpation, paraspinous muscles are non-tender on palpation, no costal vertebral tenderness.  Lungs: No increased work of breathing, good air exchange, clear to auscultation bilaterally, no crackles or wheezing.  Cardiovascular: Regular rate and rhythm, normal S1 and S2, no S3 or S4, and no murmur noted.  Abdomen: No scars, normal bowel sounds, soft, non-distended, non-tender, no masses palpated, no hepatosplenomegaly.  Musculoskeletal: No redness, warmth, or swelling of the joints.  Motor strength is 5 out of 5 all extremities bilaterally.  Tone is normal.  Neurologic: Awake, alert, oriented to name, place and time.  Cranial nerves II-XII are grossly intact.    Neuropsychiatric: Normal affect, mood, orientation, memory and insight.  Skin: No  rashes, erythema, pallor, petechia or purpura.          Data:     Results for orders placed or performed during the hospital encounter of 01/01/19 (from the past 24 hour(s))   CBC with platelets differential   Result Value Ref Range    WBC 8.2 4.0 - 11.0 10e9/L    RBC Count 4.51 3.8 - 5.2 10e12/L    Hemoglobin 14.0 11.7 - 15.7 g/dL    Hematocrit 42.7 35.0 - 47.0 %    MCV 95 78 - 100 fl    MCH 31.0 26.5 - 33.0 pg    MCHC 32.8 31.5 - 36.5 g/dL    RDW 12.4 10.0 - 15.0 %    Platelet Count 244 150 - 450 10e9/L    Diff Method Automated Method     % Neutrophils 76.5 %    % Lymphocytes 15.2 %    % Monocytes 7.0 %    % Eosinophils 0.4 %    % Basophils 0.4 %    % Immature Granulocytes 0.5 %    Nucleated RBCs 0 0 /100    Absolute Neutrophil 6.2 1.6 - 8.3 10e9/L    Absolute Lymphocytes 1.2 0.8 - 5.3 10e9/L    Absolute Monocytes 0.6 0.0 - 1.3 10e9/L    Absolute Eosinophils 0.0 0.0 - 0.7 10e9/L    Absolute Basophils 0.0 0.0 - 0.2 10e9/L    Abs Immature Granulocytes 0.0 0 - 0.4 10e9/L    Absolute Nucleated RBC 0.0    Basic metabolic panel   Result Value Ref Range    Sodium 139 133 - 144 mmol/L    Potassium 3.8 3.4 - 5.3 mmol/L    Chloride 104 94 - 109 mmol/L    Carbon Dioxide 30 20 - 32 mmol/L    Anion Gap 5 3 - 14 mmol/L    Glucose 88 70 - 99 mg/dL    Urea Nitrogen 19 7 - 30 mg/dL    Creatinine 0.86 0.52 - 1.04 mg/dL    GFR Estimate 71 >60 mL/min/[1.73_m2]    GFR Estimate If Black 82 >60 mL/min/[1.73_m2]    Calcium 8.6 8.5 - 10.1 mg/dL   Troponin I   Result Value Ref Range    Troponin I ES <0.015 0.000 - 0.045 ug/L   Hepatic panel   Result Value Ref Range    Bilirubin Direct 0.1 0.0 - 0.2 mg/dL    Bilirubin Total 0.4 0.2 - 1.3 mg/dL    Albumin 3.6 3.4 - 5.0 g/dL    Protein Total 6.3 (L) 6.8 - 8.8 g/dL    Alkaline Phosphatase 53 40 - 150 U/L    ALT 29 0 - 50 U/L    AST 16 0 - 45 U/L   Lactate Dehydrogenase   Result Value Ref Range    Lactate Dehydrogenase 185 81 - 234 U/L   EKG 12-lead, tracing only   Result Value Ref Range     Interpretation ECG Click View Image link to view waveform and result    UA with Microscopic   Result Value Ref Range    Color Urine Yellow     Appearance Urine Clear     Glucose Urine Negative NEG^Negative mg/dL    Bilirubin Urine Negative NEG^Negative    Ketones Urine Negative NEG^Negative mg/dL    Specific Gravity Urine 1.017 1.003 - 1.035    Blood Urine Negative NEG^Negative    pH Urine 7.0 5.0 - 7.0 pH    Protein Albumin Urine Negative NEG^Negative mg/dL    Urobilinogen mg/dL 0.0 0.0 - 2.0 mg/dL    Nitrite Urine Negative NEG^Negative    Leukocyte Esterase Urine Negative NEG^Negative    Source Midstream Urine     WBC Urine 1 0 - 5 /HPF    RBC Urine 1 0 - 2 /HPF    Bacteria Urine Few (A) NEG^Negative /HPF    Squamous Epithelial /HPF Urine 1 0 - 1 /HPF    Mucous Urine Present (A) NEG^Negative /LPF   CT Chest (PE) Abdomen Pelvis w Contrast    Narrative    CT CHEST PE ABDOMEN AND PELVIS WITH CONTRAST 1/1/2019 2:27 PM    HISTORY: Unexplained chest and left upper abdominal pain with  shortness of breath. History of thoracoabdominal aortic aneurysm.    TECHNIQUE: Helical axial scans from the lung apices through pubic  symphysis with 58 mL Isovue-370 IV contrast. Radiation dose for this  scan was reduced using automated exposure control, adjustment of the  mA and/or kV according to patient size, or iterative reconstruction  technique.    COMPARISON: CT abdomen and pelvis 6/2/2017.    FINDINGS:    Chest: There is no CT evidence for pulmonary embolism or other acute  vascular abnormality of the chest. Calcified subcarinal lymph nodes  indicating old granulomatous disease are seen. Mild vascular  calcifications. Mediastinal and hilar structures are otherwise  unremarkable. In particular, there is no evidence for thoracic aortic  aneurysm or dissection. There is a 2.2 cm lung cyst or focus of  bronchiectasis in the left lower lobe medially (image 96 of series 5).  This was present and appears unchanged since the prior CT  abdomen exam  dating back to 5/21/2014. The lungs are otherwise unremarkable  bilaterally. 8 mm sclerotic focus in the T10 vertebral body is  unchanged since the prior abdomen CT of 6/2/2017 and is likely a  benign bone island.    Abdomen and pelvis: The liver shows a subcentimeter lucency in the  medial segment of the left lobe which is unchanged since prior  contrast-enhanced exam of 5/21/2014 and is considered a benign cyst.  The liver is otherwise unremarkable. Enumerable calcifications  scattered in the spleen consistent with old granulomatous disease.  There is a bandlike area of decreased contrast enhancement in the  midportion of the spleen which is new since the prior  contrast-enhanced studies. This may represent an area of splenic  infarction (image 21 of series 9). A smaller lucency in the anterior  spleen is also new and not specific but potentially representing a  small area of infarction. The pancreas and bilateral adrenal glands  are unremarkable. Small benign cyst upper pole left kidney and mid  pole right kidney. The bowel and mesentery in the upper abdomen are  unremarkable. Abdominal aorta is unremarkable.    Scans through the pelvis show distal descending and sigmoid colon  diverticulosis with no evidence for diverticulitis. No free fluid.  There is a retrocecal appendix without appendicitis (image 54 of  series 9). No uterus consistent with marked uterine atrophy or prior  hysterectomy. 1 cm sclerotic focus in the L2 vertebral body without  change consistent with a bone island.      Impression    IMPRESSION:  1. No evidence for pulmonary embolism or thoracic/abdominal aortic  aneurysm or dissection.  2. Interval development of a bandlike area of decreased contrast  enhancement in the mid spleen and a smaller focus of decreased density  in the anterior spleen. These may represent regions of splenic infarct  of uncertain exact age but new since 5/21/2014.  3. Distal colonic  diverticulosis.    DONALD DIAZ MD     EKG results:   Performed on admission        Normal sinus rhythm, normal axis, no acute ischemic ST segment or T wave changes     All imaging studies reviewed by me.     Attestation:  I have reviewed today's vital signs, notes, medications, labs and imaging.  Total time: 45 minutes    Alexander Pinto MD

## 2019-01-01 NOTE — ED NOTES
"[facility]  ED Nurse Handoff Report    Breonna Caruso is a 63 year old female   ED Chief complaint: Abdominal Pain  . ED Diagnosis:   Final diagnoses:   Splenic infarct     Allergies:   Allergies   Allergen Reactions     Ciprofloxacin Swelling     Facial swelling     Codeine Sulfate      rash     Lorcet [Hydrocodone-Acetaminophen]      Penicillin G      Rash, hives     Sulfa Drugs      Rash, hives       Code Status: not on file  Activity level - Baseline/Home:  Independent. Activity Level - Current:   Independent. Lift room needed: No. Bariatric: No   Needed: No   Isolation: No. Infection: Not Applicable.     Vital Signs:   Vitals:    01/01/19 1211 01/01/19 1215 01/01/19 1230 01/01/19 1345   BP: 156/88 152/87 149/89    Pulse: 76 83 69    Resp: 18      Temp: 97.8  F (36.6  C)      TempSrc: Oral      SpO2: 95% 96% 94% 94%   Weight: 64 kg (141 lb)      Height: 1.651 m (5' 5\")          Cardiac Rhythm:  ,      Pain level: 0-10 Pain Scale: 7  Patient confused: No. Patient Falls Risk: No.   Elimination Status: Has voided   Patient Report - Initial Complaint: Abd pain. Focused Assessment:  Breonna Caruso is a 63 year old female who presents to the emergency department for evaluation of abdominal pain. The patient reports left upper abdominal pain and left lower chest discomfort and shortness of breath. Her symptoms began this morning before she got out of bed. Patient reports nausea and one episode of vomiting which she reports was clear because she had not eaten anything yet. Her pain is constant and does not seem to improve with any interventions she has tried at home. Patient rates her pain as 7-8/10. She denies fever, diarrhea, hematuria, dysuria, diarrhea, constipation.          Tests Performed:   Labs Ordered and Resulted from Time of ED Arrival Up to the Time of Departure from the ED   ROUTINE UA WITH MICROSCOPIC - Abnormal; Notable for the following components:       Result Value    " Bacteria Urine Few (*)     Mucous Urine Present (*)     All other components within normal limits   CBC WITH PLATELETS DIFFERENTIAL   BASIC METABOLIC PANEL   TROPONIN I   HEPATIC PANEL   LACTATE DEHYDROGENASE   PERIPHERAL IV CATHETER     CT Chest (PE) Abdomen Pelvis w Contrast   Preliminary Result   IMPRESSION:   1. No evidence for pulmonary embolism or thoracic/abdominal aortic   aneurysm or dissection.   2. Interval development of a bandlike area of decreased contrast   enhancement in the mid spleen and a smaller focus of decreased density   in the anterior spleen. These may represent regions of splenic infarct   of uncertain exact age but new since 5/21/2014.   3. Distal colonic diverticulosis.             Treatments provided: See MAR  Family Comments: at bedside  OBS brochure/video discussed/provided to patient:  N/A  ED Medications:   Medications   0.9% sodium chloride BOLUS (0 mLs Intravenous Stopped 1/1/19 4468)   iopamidol (ISOVUE-370) solution 500 mL (85 mLs Intravenous Given 1/1/19 3397)     Drips infusing:  No  For the majority of the shift, the patient's behavior Green. Interventions performed were rounding.     Severe Sepsis OR Septic Shock Diagnosis Present: No      ED Nurse Name/Phone Number: Delfina Campo,   3:51 PM

## 2019-01-01 NOTE — ED AVS SNAPSHOT
[facility] Emergency Department  [address]  [facility] [address]  Phone:  [phone]  Fax:  [phone]                                    Breonna Caruso   MRN: [id number]    Department:  [facility] Emergency Department   Date of Visit:  1/1/2019           After Visit Summary Signature Page    I have received my discharge instructions, and my questions have been answered. I have discussed any challenges I see with this plan with the nurse or doctor.    ..........................................................................................................................................  Patient/Patient Representative Signature      ..........................................................................................................................................  Patient Representative Print Name and Relationship to Patient    ..................................................               ................................................  Date                                   Time    ..........................................................................................................................................  Reviewed by Signature/Title    ...................................................              ..............................................  Date                                               Time          22EPIC Rev 08/18

## 2019-01-01 NOTE — DISCHARGE INSTRUCTIONS
Discharge Instructions  Abdominal Pain    Abdominal pain can be caused by many things. Your evaluation today does not show the exact cause for your pain. Your doctor today has decided that it is unlikely your pain is due to a life threatening problem, or a problem requiring surgery or hospital admission. Sometimes those problems cannot be found right away, so it is very important that you follow up as directed.  Sometimes only the changes which occur over time allow the cause of your pain to be found.    Return to the Emergency Department for a recheck in 8-12 hours if your pain continues.  If your pain gets worse, changes in location, or feels different, return to the Emergency Department right away.    ADULTS:  Return to the Emergency Department right away if:    You get an oral temperature above 102oF or as directed by your doctor.  You have blood in your stools (bright red or black, tarry stools).  You keep throwing up or can?t drink liquids.  You see blood when you throw up.  You can?t have a bowel movement or you can?t pass gas.  Your stomach gets bloated or bigger.  Your skin or the whites of your eyes look yellow.  You faint.  You have bloody, frequent or painful urination.  You have new symptoms or anything that worries you.    CHILDREN:  Return to the Emergency Department right away if your child has any of the above-listed symptoms or the following:    Pushes your hand away or screams/cries when his/her belly is touched.  You notice your child is very fussy or weak.  Your child is very tired and is too tired to eat or drink.  Your child is dehydrated.  Signs of dehydration can be:  Your infant has had no wet diapers in 4-5 hours.  Your older child has not passed urine in 6-8 hours.  Your infant or child starts to have dry mouth and lips, or no saliva or tears.    PREGNANT WOMEN:  Return to the Emergency Department right away if you have any of the above-listed symptoms or the following:    You have  bleeding, leaking fluid or passing tissue from the vagina.  You have worse pain or cramping, or pain in your shoulder or back.  You have vomiting that will not stop.  You have painful or bloody urination.  You have a temperature of 100oF or more.  Your baby is not moving as much as usual.  You faint.  You get a bad headache with or without eye problems and abdominal pain.  You have a convulsion or seizure.  You have unusual discharge from your vagina and abdominal pain.    Abdominal pain is pretty common during pregnancy.  Your pain may or may not be related to your pregnancy. You should follow-up closely with your OB doctor so they can evaluate you and your baby.  Until you follow-up with your regular doctor, do the following:     Avoid sex and do not put anything in your vagina.  Drink clear fluids.  Only take medications approved by your doctor.    MORE INFORMATION:    Appendicitis:  A possible cause of abdominal pain in any person who still has their appendix is acute appendicitis. Appendicitis is often hard to diagnose.  Testing does not always rule out early appendicitis or other causes of abdominal pain. Close follow-up with your doctor and re-evaluations may be needed to figure out the reason for your abdominal pain.    Follow-up:  It is very important that you make an appointment with your clinic and go to the appointment.  If you do not follow-up with your primary doctor, it may result in missing an important development which could result in permanent injury or disability and/or lasting pain.  If there is any problem keeping your appointment, call your doctor or return to the Emergency Department.    Medications:  Take your medications as directed by your doctor today.  Before using over-the-counter medications, ask your doctor and make sure to take the medications as directed.  If you have any questions about medications, ask your doctor.    Diet:  Resume your normal diet as much as possible, but do not  "eat fried, fatty or spicy foods while you have pain.  Do not drink alcohol or have caffeine.  Do not smoke tobacco.    Probiotics: If you have been given an antibiotic, you may want to also take a probiotic pill or eat yogurt with live cultures. Probiotics have \"good bacteria\" to help your intestines stay healthy. Studies have shown that probiotics help prevent diarrhea and other intestine problems (including C. diff infection) when you take antibiotics. You can buy these without a prescription in the pharmacy section of the store.     If you were given a prescription for medicine here today, be sure to read all of the information (including the package insert) that comes with your prescription.  This will include important information about the medicine, its side effects, and any warnings that you need to know about.  The pharmacist who fills the prescription can provide more information and answer questions you may have about the medicine.  If you have questions or concerns that the pharmacist cannot address, please call or return to the Emergency Department.         Opioid Medication Information    Pain medications are among the most commonly prescribed medicines, so we are including this information for all our patients. If you did not receive pain medication or get a prescription for pain medicine, you can ignore it.     You may have been given a prescription for an opioid (narcotic) pain medicine and/or have received a pain medicine while here in the Emergency Department. These medicines can make you drowsy or impaired. You must not drive, operate dangerous equipment, or engage in any other dangerous activities while taking these medications. If you drive while taking these medications, you could be arrested for DUI, or driving under the influence. Do not drink any alcohol while you are taking these medications.     Opioid pain medications can cause addiction. If you have a history of chemical dependency of " any type, you are at a higher risk of becoming addicted to pain medications.  Only take these prescribed medications to treat your pain when all other options have been tried. Take it for as short a time and as few doses as possible. Store your pain pills in a secure place, as they are frequently stolen and provide a dangerous opportunity for children or visitors in your house to start abusing these powerful medications. We will not replace any lost or stolen medicine.  As soon as your pain is better, you should flush all your remaining medication.     Many prescription pain medications contain Tylenol  (acetaminophen), including Vicodin , Tylenol #3 , Norco , Lortab , and Percocet .  You should not take any extra pills of Tylenol  if you are using these prescription medications or you can get very sick.  Do not ever take more than 3000 mg of acetaminophen in any 24 hour period.    All opioids tend to cause constipation. Drink plenty of water and eat foods that have a lot of fiber, such as fruits, vegetables, prune juice, apple juice and high fiber cereal.  Take a laxative if you don?t move your bowels at least every other day. Miralax , Milk of Magnesia, Colace , or Senna  can be used to keep you regular.      Remember that you can always come back to the Emergency Department if you are not able to see your regular doctor in the amount of time listed above, if you get any new symptoms, or if there is anything that worries you.

## 2019-01-02 LAB — INTERPRETATION ECG - MUSE: NORMAL

## 2019-01-04 ENCOUNTER — OFFICE VISIT (OUTPATIENT)
Dept: FAMILY MEDICINE | Facility: CLINIC | Age: 64
End: 2019-01-04
Payer: COMMERCIAL

## 2019-01-04 VITALS
OXYGEN SATURATION: 94 % | BODY MASS INDEX: 24.22 KG/M2 | HEIGHT: 65 IN | SYSTOLIC BLOOD PRESSURE: 121 MMHG | TEMPERATURE: 98.1 F | DIASTOLIC BLOOD PRESSURE: 70 MMHG | WEIGHT: 145.4 LBS | HEART RATE: 81 BPM

## 2019-01-04 DIAGNOSIS — D73.5 INFARCTION OF SPLEEN: Primary | ICD-10-CM

## 2019-01-04 PROCEDURE — 99214 OFFICE O/P EST MOD 30 MIN: CPT | Performed by: PHYSICIAN ASSISTANT

## 2019-01-04 ASSESSMENT — ANXIETY QUESTIONNAIRES
5. BEING SO RESTLESS THAT IT IS HARD TO SIT STILL: NOT AT ALL
2. NOT BEING ABLE TO STOP OR CONTROL WORRYING: NOT AT ALL
7. FEELING AFRAID AS IF SOMETHING AWFUL MIGHT HAPPEN: NOT AT ALL
3. WORRYING TOO MUCH ABOUT DIFFERENT THINGS: NOT AT ALL
IF YOU CHECKED OFF ANY PROBLEMS ON THIS QUESTIONNAIRE, HOW DIFFICULT HAVE THESE PROBLEMS MADE IT FOR YOU TO DO YOUR WORK, TAKE CARE OF THINGS AT HOME, OR GET ALONG WITH OTHER PEOPLE: NOT DIFFICULT AT ALL
1. FEELING NERVOUS, ANXIOUS, OR ON EDGE: NOT AT ALL
GAD7 TOTAL SCORE: 0
6. BECOMING EASILY ANNOYED OR IRRITABLE: NOT AT ALL

## 2019-01-04 ASSESSMENT — PATIENT HEALTH QUESTIONNAIRE - PHQ9
SUM OF ALL RESPONSES TO PHQ QUESTIONS 1-9: 1
5. POOR APPETITE OR OVEREATING: NOT AT ALL

## 2019-01-04 ASSESSMENT — MIFFLIN-ST. JEOR: SCORE: 1210.41

## 2019-01-04 NOTE — PROGRESS NOTES
SUBJECTIVE:   Breonna Caruso is a 63 year old female who presents to clinic today for the following health issues:    Developed LUQ pain that radiated to back and nauseated.  Seen in ED on 1/1/19     Had a CT : Abdomen and pelvis: The liver shows a subcentimeter lucency in the  medial segment of the left lobe which is unchanged since prior  contrast-enhanced exam of 5/21/2014 and is considered a benign cyst.  The liver is otherwise unremarkable. Enumerable calcifications  scattered in the spleen consistent with old granulomatous disease.  There is a bandlike area of decreased contrast enhancement in the  midportion of the spleen which is new since the prior  contrast-enhanced studies. This may represent an area of splenic  infarction (image 21 of series 9). A smaller lucency in the anterior  spleen is also new and not specific but potentially representing a  small area of infarction. The pancreas and bilateral adrenal glands  are unremarkable. Small benign cyst upper pole left kidney and mid  pole right kidney. The bowel and mesentery in the upper abdomen are  unremarkable. Abdominal aorta is unremarkable.      Pain has resolved for the most part. Just went away    ED/UC Followup:    Facility:  [facility]   Date of visit: Jan 1st  Reason for visit: splenic infarct -   Current Status: better             Problem list and histories reviewed & adjusted, as indicated.  Additional history: as documented    Current Outpatient Medications   Medication Sig Dispense Refill     albuterol (PROAIR HFA/PROVENTIL HFA/VENTOLIN HFA) 108 (90 Base) MCG/ACT Inhaler Inhale 2 puffs into the lungs every 6 hours as needed for shortness of breath / dyspnea or wheezing 3 Inhaler 0     buPROPion (WELLBUTRIN SR) 200 MG 12 hr tablet Take 1 tablet (200 mg) by mouth 2 times daily 180 tablet 3     DULoxetine (CYMBALTA) 20 MG EC capsule TAKE ONE CAPSULE BY MOUTH  EVERY DAY 90 capsule 1     esomeprazole (NEXIUM) 20 MG capsule Take 1 capsule  (20 mg) by mouth every morning (before breakfast) Take 30-60 minutes before eating. 30 capsule 0     estrogens, conjugated, (PREMARIN) 0.3 MG tablet Take 1 tablet (0.3 mg) by mouth daily 90 tablet 3     fluticasone (FLONASE) 50 MCG/ACT spray SPRAY 2 SPRAYS INTO EACH   NOSTRIL DAILY. 48 g 1     lisinopril (PRINIVIL,ZESTRIL) 30 MG tablet TAKE ONE TABLET BY MOUTH   EVERY DAY 90 tablet 1     rivaroxaban ANTICOAGULANT (XARELTO) 20 MG TABS tablet Take 1 tablet (20 mg) by mouth daily (with dinner) 30 tablet 0     simvastatin (ZOCOR) 40 MG tablet TAKE ONE TABLET BY MOUTH   EVERY NIGHT AT BEDTIME 90 tablet 3     SUMAtriptan (IMITREX) 5 MG/ACT nasal spray Spray 1 spray (5 mg) in nostril as needed for migraine 1 Inhaler 12     traZODone (DESYREL) 50 MG tablet Take 1-2 tablets ( mg) by mouth nightly as needed for sleep 180 tablet 1     estrogens-methylTESTOSTERone (ESTRATEST) 1.25-2.5 MG per tablet Take 1 tablet by mouth daily (Patient not taking: Reported on 1/4/2019) 90 tablet 3     fexofenadine (ALLEGRA) 180 MG tablet Take 1 tablet by mouth daily. (Patient not taking: Reported on 1/4/2019) 90 tablet 3     tretinoin (RETIN-A) 0.05 % cream Spread a pea size amount into affected area.  Use sunscreen SPF>20. (Patient not taking: Reported on 1/4/2019) 45 g 11     Recent Labs   Lab Test 01/01/19  1227 04/24/18  0908 06/01/17  1458 05/18/17  0906 01/23/16  0900  03/28/11  0825   A1C  --   --   --   --   --   --  5.6   LDL  --  89  --  104* 107*   < > 97   HDL  --  38*  --  46* 40*   < > 39*   TRIG  --  84  --  98 104   < > 121   ALT 29 29 24 71* 28   < > 36   CR 0.86 0.63 0.71 0.71 0.79   < > 0.82   GFRESTIMATED 71 >90 83 83 74   < > 72   GFRESTBLACK 82 >90 >90   GFR Calc   >90   GFR Calc   90   < > 87   POTASSIUM 3.8 4.2 4.2 4.1 4.0   < > 4.0   TSH  --  1.14  --  1.47 1.21   < > 1.03    < > = values in this interval not displayed.      BP Readings from Last 3 Encounters:   01/04/19 121/70  "  01/01/19 117/67   12/22/18 120/78    Wt Readings from Last 3 Encounters:   01/04/19 66 kg (145 lb 6.4 oz)   01/01/19 64 kg (141 lb)   12/22/18 65.8 kg (145 lb)                    Reviewed and updated as needed this visit by clinical staff       Reviewed and updated as needed this visit by Provider         ROS:  Constitutional, HEENT, cardiovascular, pulmonary, gi and gu systems are negative, except as otherwise noted.    OBJECTIVE:                                                    /70 (BP Location: Right arm, Patient Position: Chair, Cuff Size: Adult Regular)   Pulse 81   Temp 98.1  F (36.7  C) (Oral)   Ht 1.651 m (5' 5\")   Wt 66 kg (145 lb 6.4 oz)   SpO2 94%   BMI 24.20 kg/m     Body mass index is 24.2 kg/m .  GENERAL APPEARANCE: healthy, alert and no distress  HENT: ear canals and TM's normal and nose and mouth without ulcers or lesions  RESP: lungs clear to auscultation - no rales, rhonchi or wheezes  CV: regular rates and rhythm, normal S1 S2, no S3 or S4 and no murmur, click or rub  LYMPHATICS: no cervical adenopathy  ABDOMEN: soft, nontender, without hepatosplenomegaly or masses and bowel sounds normal  MS: extremities normal- no gross deformities noted         ASSESSMENT/PLAN:                                                    1. Infarction of spleen  Although pain has resolved, Well refer to hematology for further workup. .    - ONC/HEME ADULT REFERRAL          Ramona Ann Aaseby-Aguilera, PA-C  [facility]    "

## 2019-01-04 NOTE — PATIENT INSTRUCTIONS
(D73.5) Infarction of spleen  (primary encounter diagnosis)  Comment:   Plan: ONC/HEME ADULT REFERRAL            ? Etiology  Advised follow-up with hematology.

## 2019-01-05 ASSESSMENT — ANXIETY QUESTIONNAIRES: GAD7 TOTAL SCORE: 0

## 2019-01-18 ENCOUNTER — TRANSFERRED RECORDS (OUTPATIENT)
Dept: HEALTH INFORMATION MANAGEMENT | Facility: CLINIC | Age: 64
End: 2019-01-18

## 2019-01-28 DIAGNOSIS — D73.5 SPLENIC INFARCT: Primary | ICD-10-CM

## 2019-01-28 NOTE — TELEPHONE ENCOUNTER
Requested Prescriptions   Pending Prescriptions Disp Refills     rivaroxaban ANTICOAGULANT (XARELTO) 20 MG TABS tablet 30 tablet 0     Sig: Take 1 tablet (20 mg) by mouth daily (with dinner)    Direct Oral Anticoagulant Agents Failed - 1/28/2019 11:35 AM       Failed - Creatinine Clearance greater than 50 ml/min on file in past 3 mos    No lab results found.         Passed - Normal Platelets on file in past 12 months    Recent Labs   Lab Test 01/01/19  1227                 Passed - Medication is active on med list       Passed - Serum creatinine less than or equal to 1.4 on file in past 3 mos    Recent Labs   Lab Test 01/01/19  1227   CR 0.86            Passed - Patient is 18 years of age or older       Passed - No active pregnancy on record       Passed - No positive pregnancy test within past 12 months       Passed - Recent (6 mo) or future (30 days) visit within the authorizing provider's specialty        Last Written Prescription Date:  01/01/19  Last Fill Quantity: 30,  # refills: 0   Last office visit: 1/4/2019 with prescribing provider:  Aaseby-Aguilera, Ramona Ann     Future Office Visit:

## 2019-01-28 NOTE — TELEPHONE ENCOUNTER
Estimated Creatinine Clearance: 68.9 mL/min (based on SCr of 0.86 mg/dL).  Routing refill request to provider for review/approval because:  Labs out of range:  CR  Poonam Taylor RN, BSN    Last Written Prescription Date:  1/1/19  Last Fill Quantity: 30,  # refills: 0   Last office visit: 1/4/2019 with prescribing provider:  Lula   Future Office Visit:    Requested Prescriptions   Pending Prescriptions Disp Refills     rivaroxaban ANTICOAGULANT (XARELTO) 20 MG TABS tablet 30 tablet 0     Sig: Take 1 tablet (20 mg) by mouth daily (with dinner)    Direct Oral Anticoagulant Agents Failed - 1/28/2019 11:35 AM       Failed - Creatinine Clearance greater than 50 ml/min on file in past 3 mos    No lab results found.         Passed - Normal Platelets on file in past 12 months    Recent Labs   Lab Test 01/01/19  1227                 Passed - Medication is active on med list       Passed - Serum creatinine less than or equal to 1.4 on file in past 3 mos    Recent Labs   Lab Test 01/01/19  1227   CR 0.86            Passed - Patient is 18 years of age or older       Passed - No active pregnancy on record       Passed - No positive pregnancy test within past 12 months       Passed - Recent (6 mo) or future (30 days) visit within the authorizing provider's specialty

## 2019-03-03 DIAGNOSIS — J30.9 RHINITIS, ALLERGIC: ICD-10-CM

## 2019-03-04 RX ORDER — FLUTICASONE PROPIONATE 50 MCG
SPRAY, SUSPENSION (ML) NASAL
Qty: 48 G | Refills: 1 | Status: SHIPPED | OUTPATIENT
Start: 2019-03-04 | End: 2019-09-20

## 2019-03-04 NOTE — TELEPHONE ENCOUNTER
"Requested Prescriptions   Pending Prescriptions Disp Refills     fluticasone (FLONASE) 50 MCG/ACT nasal spray [Pharmacy Med Name: FLUTICASONE  SPR [id number]] 48 g 1    Last Written Prescription Date:  09/14/2018  Last Fill Quantity: 1 bottle,  # refills: 1   Last office visit: 1/4/2019 with prescribing provider:  01/04/2019   Future Office Visit:     Sig: SPRAY 2 SPRAYS INTO EACH   NOSTRIL DAILY.    Inhaled Steroids Protocol Passed - 3/3/2019  7:07 PM       Passed - Patient is age 12 or older       Passed - Asthma control assessment score within normal limits in last 6 months    Please review ACT score.          Passed - Medication is active on med list       Passed - Recent (6 mo) or future (30 days) visit within the authorizing provider's specialty    Patient had office visit in the last 6 months or has a visit in the next 30 days with authorizing provider or within the authorizing provider's specialty.  See \"Patient Info\" tab in inbasket, or \"Choose Columns\" in Meds & Orders section of the refill encounter.            Wili June XRT  "

## 2019-03-04 NOTE — TELEPHONE ENCOUNTER
Prescription approved per [facility] Refill Protocol.  Poonam Taylor RN, BSN

## 2019-03-25 DIAGNOSIS — D73.5 SPLENIC INFARCT: ICD-10-CM

## 2019-03-25 NOTE — TELEPHONE ENCOUNTER
Routing refill request to provider for review/approval because:  Labs not current:  CR    Estimated Creatinine Clearance: 68 mL/min (based on SCr of 0.86 mg/dL).  Serum creatinine: 0.86 mg/dL 01/01/19 1227  Estimated creatinine clearance: 68 mL/min

## 2019-03-25 NOTE — TELEPHONE ENCOUNTER
Requested Prescriptions   Pending Prescriptions Disp Refills     XARELTO 20 MG TABS tablet [Pharmacy Med Name: XARELTO 20 MG TABLET] 30 tablet 1    Last Written Prescription Date:  01/28/2019  Last Fill Quantity: 30 tablet,  # refills: 1   Last office visit: 1/4/2019 with prescribing provider:  01/04/2019   Future Office Visit:     Sig: TAKE 1 TABLET (20 MG) BY MOUTH DAILY (WITH DINNER)    Direct Oral Anticoagulant Agents Failed - 3/25/2019  1:53 AM       Failed - Creatinine Clearance greater than 50 ml/min on file in past 3 mos    No lab results found.         Passed - Normal Platelets on file in past 12 months    Recent Labs   Lab Test 01/01/19  1227                 Passed - Medication is active on med list       Passed - Serum creatinine less than or equal to 1.4 on file in past 3 mos    Recent Labs   Lab Test 01/01/19  1227   CR 0.86            Passed - Patient is 18 years of age or older       Passed - No active pregnancy on record       Passed - No positive pregnancy test within past 12 months       Passed - Recent (6 mo) or future (30 days) visit within the authorizing provider's specialty        Wili GILL

## 2019-03-27 RX ORDER — RIVAROXABAN 20 MG/1
TABLET, FILM COATED ORAL
Qty: 30 TABLET | Refills: 1 | Status: SHIPPED | OUTPATIENT
Start: 2019-03-27 | End: 2019-08-06

## 2019-04-22 ENCOUNTER — TRANSFERRED RECORDS (OUTPATIENT)
Dept: HEALTH INFORMATION MANAGEMENT | Facility: CLINIC | Age: 64
End: 2019-04-22

## 2019-05-29 DIAGNOSIS — R68.82 DECREASED LIBIDO: ICD-10-CM

## 2019-05-29 NOTE — TELEPHONE ENCOUNTER
"Requested Prescriptions   Pending Prescriptions Disp Refills     buPROPion (WELLBUTRIN SR) 200 MG 12 hr tablet [Pharmacy Med Name: BUPROPION HCL  MG TABLET] 180 tablet 3     Sig: TAKE 1 TABLET BY MOUTH TWICE A DAY     Last Written Prescription Date:  04/24/2018  Last Fill Quantity: 180 tablet,  # refills: 3   Last office visit: 1/4/2019 with prescribing provider:  01/04/2019   Future Office Visit:          SSRIs Protocol Passed - 5/29/2019  2:26 AM        Passed - Recent (12 mo) or future (30 days) visit within the authorizing provider's specialty     Patient had office visit in the last 12 months or has a visit in the next 30 days with authorizing provider or within the authorizing provider's specialty.  See \"Patient Info\" tab in inbasket, or \"Choose Columns\" in Meds & Orders section of the refill encounter.              Passed - Medication is Bupropion     If the medication is Bupropion (Wellbutrin), and the patient is taking for smoking cessation; OK to refill.          Passed - Medication is active on med list        Passed - Patient is age 18 or older        Passed - No active pregnancy on record        Passed - No positive pregnancy test in last 12 months          Wili CHAVEZT  "

## 2019-05-29 NOTE — TELEPHONE ENCOUNTER
Routing refill request to provider for review/approval because:  Drug not on the [facility] refill protocol for dx attached (decreased libido)  Poonam Taylor RN, BSN

## 2019-05-30 DIAGNOSIS — I10 HTN, GOAL BELOW 140/90: ICD-10-CM

## 2019-05-30 DIAGNOSIS — F33.0 MAJOR DEPRESSIVE DISORDER, RECURRENT EPISODE, MILD (H): ICD-10-CM

## 2019-05-30 RX ORDER — LISINOPRIL 30 MG/1
TABLET ORAL
Qty: 90 TABLET | Refills: 0 | Status: SHIPPED | OUTPATIENT
Start: 2019-05-30 | End: 2019-08-06

## 2019-05-30 RX ORDER — DULOXETIN HYDROCHLORIDE 20 MG/1
CAPSULE, DELAYED RELEASE ORAL
Qty: 90 CAPSULE | Refills: 0 | Status: SHIPPED | OUTPATIENT
Start: 2019-05-30 | End: 2019-08-06

## 2019-05-30 RX ORDER — BUPROPION HYDROCHLORIDE 200 MG/1
TABLET, EXTENDED RELEASE ORAL
Qty: 180 TABLET | Refills: 3 | Status: SHIPPED | OUTPATIENT
Start: 2019-05-30 | End: 2020-07-20

## 2019-05-30 NOTE — TELEPHONE ENCOUNTER
Prescription approved per [facility] Refill Protocol.  Poonam Taylor RN, BSN

## 2019-05-30 NOTE — TELEPHONE ENCOUNTER
"Requested Prescriptions   Pending Prescriptions Disp Refills     lisinopril (PRINIVIL/ZESTRIL) 30 MG tablet [Pharmacy Med Name: LISINOPRIL 30 MG TABLET] 90 tablet 2     Sig: TAKE 1 TABLET BY MOUTH EVERY DAY     Last Written Prescription Date:  09/04/2018  Last Fill Quantity: ,  # refills: 90 tablet   Last office visit: 1/4/2019 with prescribing provider:  101/04/2019   Future Office Visit:          ACE Inhibitors (Including Combos) Protocol Passed - 5/30/2019  1:44 AM        Passed - Blood pressure under 140/90 in past 12 months     BP Readings from Last 3 Encounters:   01/04/19 121/70   01/01/19 117/67   12/22/18 120/78                 Passed - Recent (12 mo) or future (30 days) visit within the authorizing provider's specialty     Patient had office visit in the last 12 months or has a visit in the next 30 days with authorizing provider or within the authorizing provider's specialty.  See \"Patient Info\" tab in inbasket, or \"Choose Columns\" in Meds & Orders section of the refill encounter.              Passed - Medication is active on med list        Passed - Patient is age 18 or older        Passed - No active pregnancy on record        Passed - Normal serum creatinine on file in past 12 months     Recent Labs   Lab Test 01/01/19  1227   CR 0.86             Passed - Normal serum potassium on file in past 12 months     Recent Labs   Lab Test 01/01/19  1227   POTASSIUM 3.8             Passed - No positive pregnancy test within past 12 months            DULoxetine (CYMBALTA) 20 MG capsule [Pharmacy Med Name: DULOXETINE HCL DR 20 MG CAP] 90 capsule 2     Sig: TAKE ONE CAPSULE BY MOUTH ONCE DAILY     Last Written Prescription Date:  09/04/2018  Last Fill Quantity: 90 capsule,  # refills: 1   Last office visit: 1/4/2019 with prescribing provider:  01/04/2019   Future Office Visit:          Serotonin-Norepinephrine Reuptake Inhibitors  Passed - 5/30/2019  1:44 AM        Passed - Blood pressure under 140/90 in past 12 " "months     BP Readings from Last 3 Encounters:   01/04/19 121/70   01/01/19 117/67   12/22/18 120/78                 Passed - PHQ-9 score of less than 5 in past 6 months     Please review last PHQ-9 score.           Passed - Medication is active on med list        Passed - Patient is age 18 or older        Passed - No active pregnancy on record        Passed - No positive pregnancy test in past 12 months        Passed - Recent (6 mo) or future (30 days) visit within the authorizing provider's specialty     Patient had office visit in the last 6 months or has a visit in the next 30 days with authorizing provider or within the authorizing provider's specialty.  See \"Patient Info\" tab in inbasket, or \"Choose Columns\" in Meds & Orders section of the refill encounter.              Wili June XRT  "

## 2019-06-04 ENCOUNTER — HOSPITAL ENCOUNTER (OUTPATIENT)
Dept: MAMMOGRAPHY | Facility: CLINIC | Age: 64
End: 2019-06-04
Attending: PHYSICIAN ASSISTANT
Payer: COMMERCIAL

## 2019-06-04 ENCOUNTER — HOSPITAL ENCOUNTER (OUTPATIENT)
Dept: ULTRASOUND IMAGING | Facility: CLINIC | Age: 64
Discharge: HOME | End: 2019-06-04
Attending: INTERNAL MEDICINE | Admitting: INTERNAL MEDICINE
Payer: COMMERCIAL

## 2019-06-04 DIAGNOSIS — D73.5 SPLENIC INFARCTION: ICD-10-CM

## 2019-06-04 DIAGNOSIS — Z12.31 SCREENING MAMMOGRAM, ENCOUNTER FOR: ICD-10-CM

## 2019-06-04 DIAGNOSIS — R10.9 ABDOMINAL PAIN, UNSPECIFIED ABDOMINAL LOCATION: ICD-10-CM

## 2019-06-04 PROCEDURE — 76700 US EXAM ABDOM COMPLETE: CPT

## 2019-06-04 PROCEDURE — 77063 BREAST TOMOSYNTHESIS BI: CPT

## 2019-06-20 DIAGNOSIS — R06.83 SNORING: ICD-10-CM

## 2019-06-20 DIAGNOSIS — G47.00 PERSISTENT INSOMNIA: ICD-10-CM

## 2019-06-20 RX ORDER — TRAZODONE HYDROCHLORIDE 50 MG/1
TABLET, FILM COATED ORAL
Qty: 180 TABLET | Refills: 0 | Status: SHIPPED | OUTPATIENT
Start: 2019-06-20 | End: 2019-08-06

## 2019-06-20 NOTE — TELEPHONE ENCOUNTER
"Requested Prescriptions   Pending Prescriptions Disp Refills     traZODone (DESYREL) 50 MG tablet [Pharmacy Med Name: TRAZODONE 50 MG TABLET] 180 tablet 1     Sig: TAKE 1-2 TABLETS BY MOUTH EVERY NIGHT AS NEEDED FOR SLEEP     Last Written Prescription Date:  04/24/2018  Last Fill Quantity: 180 tablet,  # refills: 1   Last office visit: 1/4/2019 with prescribing provider:  01/04/2019   Future Office Visit:          Serotonin Modulators Passed - 6/20/2019  1:51 AM        Passed - Recent (12 mo) or future (30 days) visit within the authorizing provider's specialty     Patient had office visit in the last 12 months or has a visit in the next 30 days with authorizing provider or within the authorizing provider's specialty.  See \"Patient Info\" tab in inbasket, or \"Choose Columns\" in Meds & Orders section of the refill encounter.              Passed - Medication is active on med list        Passed - Patient is age 18 or older        Passed - No active pregnancy on record        Passed - No positive pregnancy test in past 12 months        Wili GILL  "

## 2019-06-20 NOTE — TELEPHONE ENCOUNTER
Prescription approved per [facility] Refill Protocol.  For one time     Delfina Tovar RN

## 2019-08-04 DIAGNOSIS — E78.5 HYPERLIPIDEMIA LDL GOAL <160: ICD-10-CM

## 2019-08-04 NOTE — TELEPHONE ENCOUNTER
"Requested Prescriptions   Pending Prescriptions Disp Refills     simvastatin (ZOCOR) 40 MG tablet [Pharmacy Med Name: SIMVASTATIN 40 MG TABLET]  Last Written Prescription Date:  7/2/2019  Last Fill Quantity: 30 tablet,  # refills: 0   Last office visit: 1/4/2019 with prescribing provider:  Aaseby-Aguilera   Future Office Visit:   Next 5 appointments (look out 90 days)    Aug 06, 2019  8:30 AM CDT  PHYSICAL with Ramona Ann Aaseby-Aguilera, PA-C  [facility] ([facility] [address] [facility] [address] [phone]          30 tablet 0     Sig: TAKE 1 TABLET (40 MG) BY MOUTH AT BEDTIME NEED OFFICE VISIT PRIOR TO NEXT REFILL       Statins Protocol Failed - 8/4/2019  8:55 AM        Failed - LDL on file in past 12 months     Recent Labs   Lab Test 04/24/18  0908   LDL 89             Passed - No abnormal creatine kinase in past 12 months     No lab results found.             Passed - Recent (12 mo) or future (30 days) visit within the authorizing provider's specialty     Patient had office visit in the last 12 months or has a visit in the next 30 days with authorizing provider or within the authorizing provider's specialty.  See \"Patient Info\" tab in inbasket, or \"Choose Columns\" in Meds & Orders section of the refill encounter.              Passed - Medication is active on med list        Passed - Patient is age 18 or older        Passed - No active pregnancy on record        Passed - No positive pregnancy test in past 12 months          "

## 2019-08-06 ENCOUNTER — OFFICE VISIT (OUTPATIENT)
Dept: FAMILY MEDICINE | Facility: CLINIC | Age: 64
End: 2019-08-06
Payer: COMMERCIAL

## 2019-08-06 VITALS
DIASTOLIC BLOOD PRESSURE: 70 MMHG | RESPIRATION RATE: 16 BRPM | SYSTOLIC BLOOD PRESSURE: 110 MMHG | BODY MASS INDEX: 24.32 KG/M2 | WEIGHT: 146 LBS | TEMPERATURE: 97.7 F | HEIGHT: 65 IN

## 2019-08-06 DIAGNOSIS — Z00.00 ENCOUNTER FOR MEDICARE ANNUAL WELLNESS EXAM: Primary | ICD-10-CM

## 2019-08-06 DIAGNOSIS — Z78.0 POST-MENOPAUSAL: ICD-10-CM

## 2019-08-06 DIAGNOSIS — Z11.4 SCREENING FOR HIV (HUMAN IMMUNODEFICIENCY VIRUS): ICD-10-CM

## 2019-08-06 DIAGNOSIS — Z13.0 SCREENING FOR BLOOD DISEASE: ICD-10-CM

## 2019-08-06 DIAGNOSIS — Z13.220 SCREENING, LIPID: ICD-10-CM

## 2019-08-06 DIAGNOSIS — N95.1 VAGINAL DRYNESS, MENOPAUSAL: ICD-10-CM

## 2019-08-06 DIAGNOSIS — I10 HTN, GOAL BELOW 140/90: ICD-10-CM

## 2019-08-06 DIAGNOSIS — F33.0 MAJOR DEPRESSIVE DISORDER, RECURRENT EPISODE, MILD (H): ICD-10-CM

## 2019-08-06 DIAGNOSIS — R06.83 SNORING: ICD-10-CM

## 2019-08-06 DIAGNOSIS — Z13.1 SCREENING FOR DIABETES MELLITUS: ICD-10-CM

## 2019-08-06 DIAGNOSIS — G47.00 PERSISTENT INSOMNIA: ICD-10-CM

## 2019-08-06 DIAGNOSIS — E78.5 HYPERLIPIDEMIA LDL GOAL <160: ICD-10-CM

## 2019-08-06 PROCEDURE — 99397 PER PM REEVAL EST PAT 65+ YR: CPT | Performed by: PHYSICIAN ASSISTANT

## 2019-08-06 RX ORDER — LISINOPRIL 30 MG/1
30 TABLET ORAL DAILY
Qty: 90 TABLET | Refills: 3 | Status: SHIPPED | OUTPATIENT
Start: 2019-08-06 | End: 2020-09-30

## 2019-08-06 RX ORDER — GLYCERIN/MIN OIL/POLYCARBOPHIL
GEL WITH APPLICATOR (GRAM) VAGINAL
Qty: 35 G | Refills: 1 | Status: SHIPPED | OUTPATIENT
Start: 2019-08-06 | End: 2020-09-17

## 2019-08-06 RX ORDER — LIFITEGRAST 50 MG/ML
SOLUTION/ DROPS OPHTHALMIC
Refills: 3 | COMMUNITY
Start: 2019-01-31 | End: 2020-09-17

## 2019-08-06 RX ORDER — SIMVASTATIN 40 MG
TABLET ORAL
Qty: 90 TABLET | Refills: 3 | Status: SHIPPED | OUTPATIENT
Start: 2019-08-06 | End: 2020-08-13

## 2019-08-06 RX ORDER — TRAZODONE HYDROCHLORIDE 50 MG/1
TABLET, FILM COATED ORAL
Qty: 180 TABLET | Refills: 1 | Status: SHIPPED | OUTPATIENT
Start: 2019-08-06 | End: 2020-10-12 | Stop reason: SINTOL

## 2019-08-06 RX ORDER — DULOXETIN HYDROCHLORIDE 20 MG/1
20 CAPSULE, DELAYED RELEASE ORAL DAILY
Qty: 90 CAPSULE | Refills: 3 | Status: SHIPPED | OUTPATIENT
Start: 2019-08-06 | End: 2020-10-12

## 2019-08-06 ASSESSMENT — ENCOUNTER SYMPTOMS
HEMATURIA: 0
HEADACHES: 0
PALPITATIONS: 0
HEMATOCHEZIA: 0
FREQUENCY: 0
FEVER: 0
EYE PAIN: 0
NERVOUS/ANXIOUS: 1
ABDOMINAL PAIN: 0
WEAKNESS: 0
CONSTIPATION: 0
COUGH: 0
ARTHRALGIAS: 1
MYALGIAS: 0
BREAST MASS: 0
SHORTNESS OF BREATH: 0
PARESTHESIAS: 0
HEARTBURN: 0
SORE THROAT: 0
CHILLS: 0
DIARRHEA: 0
JOINT SWELLING: 0
DYSURIA: 0
NAUSEA: 0
DIZZINESS: 0

## 2019-08-06 ASSESSMENT — ANXIETY QUESTIONNAIRES
6. BECOMING EASILY ANNOYED OR IRRITABLE: NOT AT ALL
GAD7 TOTAL SCORE: 2
3. WORRYING TOO MUCH ABOUT DIFFERENT THINGS: SEVERAL DAYS
IF YOU CHECKED OFF ANY PROBLEMS ON THIS QUESTIONNAIRE, HOW DIFFICULT HAVE THESE PROBLEMS MADE IT FOR YOU TO DO YOUR WORK, TAKE CARE OF THINGS AT HOME, OR GET ALONG WITH OTHER PEOPLE: NOT DIFFICULT AT ALL
5. BEING SO RESTLESS THAT IT IS HARD TO SIT STILL: NOT AT ALL
1. FEELING NERVOUS, ANXIOUS, OR ON EDGE: NOT AT ALL
2. NOT BEING ABLE TO STOP OR CONTROL WORRYING: NOT AT ALL
7. FEELING AFRAID AS IF SOMETHING AWFUL MIGHT HAPPEN: SEVERAL DAYS

## 2019-08-06 ASSESSMENT — PATIENT HEALTH QUESTIONNAIRE - PHQ9
5. POOR APPETITE OR OVEREATING: NOT AT ALL
SUM OF ALL RESPONSES TO PHQ QUESTIONS 1-9: 0

## 2019-08-06 ASSESSMENT — MIFFLIN-ST. JEOR: SCORE: 1208.13

## 2019-08-06 ASSESSMENT — ACTIVITIES OF DAILY LIVING (ADL): CURRENT_FUNCTION: NO ASSISTANCE NEEDED

## 2019-08-06 NOTE — PROGRESS NOTES
"SUBJECTIVE:   Breonna Caruso is a 64 year old female who presents for Preventive Visit.      Are you in the first 12 months of your Medicare coverage?  Not on medicare     Healthy Habits:     In general, how would you rate your overall health?  Good    Frequency of exercise:  None    Do you usually eat at least 4 servings of fruit and vegetables a day, include whole grains    & fiber and avoid regularly eating high fat or \"junk\" foods?  Yes    Taking medications regularly:  Yes    Medication side effects:  None    Ability to successfully perform activities of daily living:  No assistance needed    Home Safety:  No safety concerns identified    Hearing Impairment:  No hearing concerns    In the past 6 months, have you been bothered by leaking of urine? Yes    In general, how would you rate your overall mental or emotional health?  Good      PHQ-2 Total Score: 0    Additional concerns today:  No    Do you feel safe in your environment? Yes    Do you have a Health Care Directive? No: Advance care planning reviewed with patient; information given to patient to review.      Fall risk       Cognitive Screening   1) Repeat 3 items (Leader, Season, Table)    2) Clock draw: NORMAL  3) 3 item recall: Recalls 3 objects  Results: 3 items recalled: COGNITIVE IMPAIRMENT LESS LIKELY    Mini-CogTM Copyright S Abraham. Licensed by the author for use in [facility]; reprinted with permission (soob@.[email or web address]). All rights reserved.      Do you have sleep apnea, excessive snoring or daytime drowsiness?: no    Reviewed and updated as needed this visit by clinical staff  Tobacco  Allergies  Meds  Med Hx  Surg Hx  Fam Hx  Soc Hx        Reviewed and updated as needed this visit by Provider        Social History     Tobacco Use     Smoking status: Never Smoker     Smokeless tobacco: Never Used   Substance Use Topics     Alcohol use: Yes     Comment: some wine/beer         Alcohol Use 8/6/2019   Prescreen: >3 drinks/day or >7 " drinks/week? No   Prescreen: >3 drinks/day or >7 drinks/week? -           No concerns     Current providers sharing in care for this patient include:   Patient Care Team:  Aaseby-Aguilera, Ramona Ann, PA-C as PCP - General (Physician Assistant)  Aaseby-Aguilera, Ramona Ann, PA-C as Assigned PCP    The following health maintenance items are reviewed in Epic and correct as of today:  Health Maintenance   Topic Date Due     HIT-6  1955     HIV SCREENING  03/19/1969     ZOSTER IMMUNIZATION (1 of 2) 03/19/2004     ASTHMA ACTION PLAN  05/26/2016     DEXA  05/14/2017     FALL RISK ASSESSMENT  03/19/2019     ASTHMA CONTROL TEST  03/24/2019     MEDICARE ANNUAL WELLNESS VISIT  04/24/2019     PHQ-9  07/04/2019     INFLUENZA VACCINE (1) 09/01/2019     MAMMO SCREENING  06/04/2021     ADVANCE CARE PLANNING  11/21/2021     DTAP/TDAP/TD IMMUNIZATION (2 - Td) 01/06/2022     LIPID  04/24/2023     COLONOSCOPY  07/10/2025     HEPATITIS C SCREENING  Completed     DEPRESSION ACTION PLAN  Completed     MIGRAINE ACTION PLAN  Completed     IPV IMMUNIZATION  Aged Out     MENINGITIS IMMUNIZATION  Aged Out     BP Readings from Last 3 Encounters:   08/06/19 110/70   01/04/19 121/70   01/01/19 117/67    Wt Readings from Last 3 Encounters:   08/06/19 66.2 kg (146 lb)   01/04/19 66 kg (145 lb 6.4 oz)   01/01/19 64 kg (141 lb)                  Allergies   Allergen Reactions     Ciprofloxacin Swelling     Facial swelling     Codeine Sulfate      rash     Lorcet [Hydrocodone-Acetaminophen]      Penicillin G      Rash, hives     Sulfa Drugs      Rash, hives     Recent Labs   Lab Test 01/01/19  1227 04/24/18  0908 06/01/17  1458 05/18/17  0906 01/23/16  0900   LDL  --  89  --  104* 107*   HDL  --  38*  --  46* 40*   TRIG  --  84  --  98 104   ALT 29 29 24 71* 28   CR 0.86 0.63 0.71 0.71 0.79   GFRESTIMATED 71 >90 83 83 74   GFRESTBLACK 82 >90 >90   GFR Calc   >90   GFR Calc   90   POTASSIUM 3.8 4.2 4.2 4.1 4.0  "  TSH  --  1.14  --  1.47 1.21          Review of Systems   Constitutional: Negative for chills and fever.   HENT: Negative for congestion, ear pain, hearing loss and sore throat.    Eyes: Negative for pain and visual disturbance.   Respiratory: Negative for cough and shortness of breath.    Cardiovascular: Negative for chest pain, palpitations and peripheral edema.   Gastrointestinal: Negative for abdominal pain, constipation, diarrhea, heartburn, hematochezia and nausea.   Breasts:  Negative for tenderness, breast mass and discharge.   Genitourinary: Negative for dysuria, frequency, genital sores, hematuria, pelvic pain, urgency, vaginal bleeding and vaginal discharge.   Musculoskeletal: Positive for arthralgias. Negative for joint swelling and myalgias.   Skin: Negative for rash.   Neurological: Negative for dizziness, weakness, headaches and paresthesias.   Psychiatric/Behavioral: Negative for mood changes. The patient is nervous/anxious.          OBJECTIVE:   /70 (BP Location: Right arm, Patient Position: Chair, Cuff Size: Adult Regular)   Temp 97.7  F (36.5  C) (Oral)   Resp 16   Ht 1.651 m (5' 5\")   Wt 66.2 kg (146 lb)   Breastfeeding? No   BMI 24.30 kg/m   Estimated body mass index is 24.3 kg/m  as calculated from the following:    Height as of this encounter: 1.651 m (5' 5\").    Weight as of this encounter: 66.2 kg (146 lb).  Physical Exam  GENERAL APPEARANCE: healthy, alert and no distress  EYES: Eyes grossly normal to inspection, PERRL and conjunctivae and sclerae normal  HENT: ear canals and TM's normal, nose and mouth without ulcers or lesions, oropharynx clear and oral mucous membranes moist  NECK: no adenopathy, no asymmetry, masses, or scars and thyroid normal to palpation  RESP: lungs clear to auscultation - no rales, rhonchi or wheezes  BREAST: normal without masses, tenderness or nipple discharge and no palpable axillary masses or adenopathy  CV: regular rate and rhythm, normal S1 S2, " no S3 or S4, no murmur, click or rub, no peripheral edema and peripheral pulses strong  ABDOMEN: soft, nontender, no hepatosplenomegaly, no masses and bowel sounds normal  MS: no musculoskeletal defects are noted and gait is age appropriate without ataxia  SKIN: no suspicious lesions or rashes  NEURO: Normal strength and tone, sensory exam grossly normal, mentation intact and speech normal  PSYCH: mentation appears normal and affect normal/bright    Diagnostic Test Results:  Labs reviewed in Epic    ASSESSMENT / PLAN:   1. Encounter for Medicare annual wellness exam      2. Screening for blood disease    - CBC with platelets    3. Screening for diabetes mellitus    - Comprehensive metabolic panel    4. Screening, lipid    - Lipid panel reflex to direct LDL Fasting    5. Screening for HIV (human immunodeficiency virus)    - HIV Antigen Antibody Combo    6. Post-menopausal    - DX Hip/Pelvis/Spine; Future    7. Major depressive disorder, recurrent episode, mild (H)    - DULoxetine (CYMBALTA) 20 MG capsule; Take 1 capsule (20 mg) by mouth daily  Dispense: 90 capsule; Refill: 3    8. HTN, goal below 140/90  Stable   - lisinopril (PRINIVIL/ZESTRIL) 30 MG tablet; Take 1 tablet (30 mg) by mouth daily  Dispense: 90 tablet; Refill: 3    9. Hyperlipidemia LDL goal <160  Stable   - simvastatin (ZOCOR) 40 MG tablet; TAKE ONE TABLET BY MOUTH   EVERY NIGHT AT BEDTIME  Dispense: 90 tablet; Refill: 3    10. Persistent insomnia  Stable   - traZODone (DESYREL) 50 MG tablet; TAKE 1-2 TABLETS BY MOUTH EVERY NIGHT AS NEEDED FOR SLEEP  Dispense: 180 tablet; Refill: 1    11. Snoring  Stable   - traZODone (DESYREL) 50 MG tablet; TAKE 1-2 TABLETS BY MOUTH EVERY NIGHT AS NEEDED FOR SLEEP  Dispense: 180 tablet; Refill: 1    12. Vaginal dryness, menopausal    - Vaginal Lubricant (REPLENS) GEL; Apply small amount to vagina as needed  Dispense: 35 g; Refill: 1    End of Life Planning:  Patient currently has an advanced directive: Yes.   "Practitioner is supportive of decision.    COUNSELING:  Reviewed preventive health counseling, as reflected in patient instructions       Regular exercise       Healthy diet/nutrition       Vision screening       Hearing screening       Aspirin Prophylaxsis    Estimated body mass index is 24.3 kg/m  as calculated from the following:    Height as of this encounter: 1.651 m (5' 5\").    Weight as of this encounter: 66.2 kg (146 lb).         reports that she has never smoked. She has never used smokeless tobacco.      Appropriate preventive services were discussed with this patient, including applicable screening as appropriate for cardiovascular disease, diabetes, osteopenia/osteoporosis, and glaucoma.  As appropriate for age/gender, discussed screening for colorectal cancer, prostate cancer, breast cancer, and cervical cancer. Checklist reviewing preventive services available has been given to the patient.    Reviewed patients plan of care and provided an AVS. The Basic Care Plan (routine screening as documented in Health Maintenance) for Breonna meets the Care Plan requirement. This Care Plan has been established and reviewed with the Patient.    Counseling Resources:  ATP IV Guidelines  Pooled Cohorts Equation Calculator  Breast Cancer Risk Calculator  FRAX Risk Assessment  ICSI Preventive Guidelines  Dietary Guidelines for Americans, 2010  [employer]'s MyPlate  ASA Prophylaxis  Lung CA Screening    Ramona Ann Aaseby-Aguilera, PA-C  [facility]    Identified Health Risks:  "

## 2019-08-06 NOTE — PATIENT INSTRUCTIONS
Patient Education   Personalized Prevention Plan  You are due for the preventive services outlined below.  Your care team is available to assist you in scheduling these services.  If you have already completed any of these items, please share that information with your care team to update in your medical record.  Health Maintenance Due   Topic Date Due     Headache Impact Test Assessment  1955     HIV Screening  03/19/1969     Zoster (Shingles) Vaccine (1 of 2) 03/19/2004     Asthma Action Plan - yearly  05/26/2016     Osteoporosis Screening  05/14/2017     FALL RISK ASSESSMENT  03/19/2019     Asthma Control Test  03/24/2019     Annual Wellness Visit  04/24/2019     Depression Assessment  07/04/2019

## 2019-08-07 DIAGNOSIS — Z13.1 SCREENING FOR DIABETES MELLITUS: ICD-10-CM

## 2019-08-07 DIAGNOSIS — Z13.0 SCREENING FOR BLOOD DISEASE: ICD-10-CM

## 2019-08-07 DIAGNOSIS — Z13.220 SCREENING, LIPID: ICD-10-CM

## 2019-08-07 DIAGNOSIS — Z11.4 SCREENING FOR HIV (HUMAN IMMUNODEFICIENCY VIRUS): ICD-10-CM

## 2019-08-07 LAB
ALBUMIN SERPL-MCNC: 3.9 G/DL (ref 3.4–5)
ALP SERPL-CCNC: 68 U/L (ref 40–150)
ALT SERPL W P-5'-P-CCNC: 61 U/L (ref 0–50)
ANION GAP SERPL CALCULATED.3IONS-SCNC: 7 MMOL/L (ref 3–14)
AST SERPL W P-5'-P-CCNC: 24 U/L (ref 0–45)
BILIRUB SERPL-MCNC: 0.3 MG/DL (ref 0.2–1.3)
BUN SERPL-MCNC: 26 MG/DL (ref 7–30)
CALCIUM SERPL-MCNC: 9.1 MG/DL (ref 8.5–10.1)
CHLORIDE SERPL-SCNC: 103 MMOL/L (ref 94–109)
CHOLEST SERPL-MCNC: 194 MG/DL
CO2 SERPL-SCNC: 31 MMOL/L (ref 20–32)
CREAT SERPL-MCNC: 0.72 MG/DL (ref 0.52–1.04)
ERYTHROCYTE [DISTWIDTH] IN BLOOD BY AUTOMATED COUNT: 13 % (ref 10–15)
GFR SERPL CREATININE-BSD FRML MDRD: 88 ML/MIN/{1.73_M2}
GLUCOSE SERPL-MCNC: 87 MG/DL (ref 70–99)
HCT VFR BLD AUTO: 41 % (ref 35–47)
HDLC SERPL-MCNC: 39 MG/DL
HGB BLD-MCNC: 13.5 G/DL (ref 11.7–15.7)
HIV 1+2 AB+HIV1 P24 AG SERPL QL IA: NONREACTIVE
LDLC SERPL CALC-MCNC: 101 MG/DL
MCH RBC QN AUTO: 31.5 PG (ref 26.5–33)
MCHC RBC AUTO-ENTMCNC: 32.9 G/DL (ref 31.5–36.5)
MCV RBC AUTO: 96 FL (ref 78–100)
NONHDLC SERPL-MCNC: 155 MG/DL
PLATELET # BLD AUTO: 235 10E9/L (ref 150–450)
POTASSIUM SERPL-SCNC: 4.2 MMOL/L (ref 3.4–5.3)
PROT SERPL-MCNC: 7.1 G/DL (ref 6.8–8.8)
RBC # BLD AUTO: 4.29 10E12/L (ref 3.8–5.2)
SODIUM SERPL-SCNC: 141 MMOL/L (ref 133–144)
TRIGL SERPL-MCNC: 268 MG/DL
WBC # BLD AUTO: 5.1 10E9/L (ref 4–11)

## 2019-08-07 PROCEDURE — 87389 HIV-1 AG W/HIV-1&-2 AB AG IA: CPT | Performed by: PHYSICIAN ASSISTANT

## 2019-08-07 PROCEDURE — 80053 COMPREHEN METABOLIC PANEL: CPT | Performed by: PHYSICIAN ASSISTANT

## 2019-08-07 PROCEDURE — 80061 LIPID PANEL: CPT | Performed by: PHYSICIAN ASSISTANT

## 2019-08-07 PROCEDURE — 36415 COLL VENOUS BLD VENIPUNCTURE: CPT | Performed by: PHYSICIAN ASSISTANT

## 2019-08-07 PROCEDURE — 85027 COMPLETE CBC AUTOMATED: CPT | Performed by: PHYSICIAN ASSISTANT

## 2019-08-07 RX ORDER — SIMVASTATIN 40 MG
40 TABLET ORAL AT BEDTIME
Qty: 30 TABLET | Refills: 0 | OUTPATIENT
Start: 2019-08-07

## 2019-08-07 ASSESSMENT — ANXIETY QUESTIONNAIRES: GAD7 TOTAL SCORE: 2

## 2019-08-07 ASSESSMENT — ASTHMA QUESTIONNAIRES: ACT_TOTALSCORE: 25

## 2019-09-16 ENCOUNTER — OFFICE VISIT (OUTPATIENT)
Dept: URGENT CARE | Facility: URGENT CARE | Age: 64
End: 2019-09-16
Payer: COMMERCIAL

## 2019-09-16 VITALS
DIASTOLIC BLOOD PRESSURE: 80 MMHG | HEIGHT: 66 IN | OXYGEN SATURATION: 95 % | WEIGHT: 145.5 LBS | HEART RATE: 78 BPM | SYSTOLIC BLOOD PRESSURE: 150 MMHG | TEMPERATURE: 98.3 F | BODY MASS INDEX: 23.38 KG/M2 | RESPIRATION RATE: 14 BRPM

## 2019-09-16 DIAGNOSIS — R30.0 DYSURIA: ICD-10-CM

## 2019-09-16 DIAGNOSIS — I10 HTN, GOAL BELOW 140/90: ICD-10-CM

## 2019-09-16 DIAGNOSIS — R82.90 NONSPECIFIC FINDING ON EXAMINATION OF URINE: ICD-10-CM

## 2019-09-16 DIAGNOSIS — N30.01 ACUTE CYSTITIS WITH HEMATURIA: Primary | ICD-10-CM

## 2019-09-16 LAB

## 2019-09-16 PROCEDURE — 81001 URINALYSIS AUTO W/SCOPE: CPT | Performed by: FAMILY MEDICINE

## 2019-09-16 PROCEDURE — 87086 URINE CULTURE/COLONY COUNT: CPT | Performed by: FAMILY MEDICINE

## 2019-09-16 PROCEDURE — 99213 OFFICE O/P EST LOW 20 MIN: CPT | Performed by: FAMILY MEDICINE

## 2019-09-16 PROCEDURE — 87088 URINE BACTERIA CULTURE: CPT | Performed by: FAMILY MEDICINE

## 2019-09-16 PROCEDURE — 87186 SC STD MICRODIL/AGAR DIL: CPT | Performed by: FAMILY MEDICINE

## 2019-09-16 RX ORDER — NITROFURANTOIN 25; 75 MG/1; MG/1
100 CAPSULE ORAL 2 TIMES DAILY
Qty: 10 CAPSULE | Refills: 0 | Status: SHIPPED | OUTPATIENT
Start: 2019-09-16 | End: 2019-12-26

## 2019-09-16 ASSESSMENT — PAIN SCALES - GENERAL: PAINLEVEL: MODERATE PAIN (4)

## 2019-09-16 ASSESSMENT — MIFFLIN-ST. JEOR: SCORE: 1213.79

## 2019-09-17 NOTE — PROGRESS NOTES
Subjective:   Breonna Caruso is a 64 year old female who presents for   Chief Complaint   Patient presents with     Urgent Care     Urinary Problem     dysuria, hematuria, slight x 2d for dysuria, hematuria started today     Last UTI was several years ago. 2 days of pain with urination with some blood in urine.  Thinks she may be dehydrated as she doesn't drink much fluids usually.   Without fevers/chills/flank discomfort.     Denies H/A or visual changes. Adherent to BP meds. Stressed out with recent lay off and also trying to sell their home.     Patient Active Problem List    Diagnosis Date Noted     Major depressive disorder, recurrent episode, mild (HCC) 01/21/2016     Priority: Medium     HTN, goal below 140/90 09/29/2014     Priority: Medium     Family history of coronary artery disease 01/18/2012     Priority: Medium     Advanced directives, counseling/discussion 10/24/2011     Priority: Medium     Advance Directive Problem List Overview:   Name Relationship Phone    Primary Health Care Agent            Alternative Health Care Agent          Patient states has Advance Directive and will bring in a copy to clinic. 10/24/2011          Migraine headache 04/22/2011     Priority: Medium     (Problem list name updated by automated process. Provider to review and confirm.)       Mild persistent asthma 02/23/2011     Priority: Medium     Hyperlipidemia LDL goal <160 02/23/2011     Priority: Medium     GERD (gastroesophageal reflux disease) 11/15/2010     Priority: Medium       Current Outpatient Medications   Medication     albuterol (PROAIR HFA/PROVENTIL HFA/VENTOLIN HFA) 108 (90 Base) MCG/ACT Inhaler     buPROPion (WELLBUTRIN SR) 200 MG 12 hr tablet     DULoxetine (CYMBALTA) 20 MG capsule     esomeprazole (NEXIUM) 20 MG capsule     fexofenadine (ALLEGRA) 180 MG tablet     fluticasone (FLONASE) 50 MCG/ACT nasal spray     lisinopril (PRINIVIL/ZESTRIL) 30 MG tablet     nitroFURantoin macrocrystal-monohydrate  "(MACROBID) 100 MG capsule     simvastatin (ZOCOR) 40 MG tablet     SUMAtriptan (IMITREX) 5 MG/ACT nasal spray     traZODone (DESYREL) 50 MG tablet     tretinoin (RETIN-A) 0.05 % cream     Vaginal Lubricant (REPLENS) GEL     XIIDRA 5 % opthalmic solution     No current facility-administered medications for this visit.        ROS:  As above per HPI    Objective:   BP (!) 150/80 (BP Location: Right arm, Patient Position: Sitting, Cuff Size: Adult Regular)   Pulse 78   Temp 98.3  F (36.8  C) (Oral)   Resp 14   Ht 1.664 m (5' 5.5\")   Wt 66 kg (145 lb 8 oz)   LMP  (LMP Unknown)   SpO2 95%   Breastfeeding? No   BMI 23.84 kg/m  , Body mass index is 23.84 kg/m .  Gen:  NAD, well-nourished, sitting in chair comfortably  HEENT: EOMI, sclera anicteric, Head normocephalic, ; nares patent; moist mucous membranes  Neck: trachea midline, no thyromegaly  CV:  Hemodynamically stable  Pulm:  no increased work of breathing   Extrem: no cyanosis, edema or clubbing  Skin: no obvious rashes or abnormalities  Psych: Euthymic, linear thoughts, normal rate of speech    Results for orders placed or performed in visit on 09/16/19   *UA reflex to Microscopic and Culture ([facility] (except [place] and [place])   Result Value Ref Range    Color Urine Yellow     Appearance Urine Clear     Glucose Urine Negative NEG^Negative mg/dL    Bilirubin Urine Negative NEG^Negative    Ketones Urine Negative NEG^Negative mg/dL    Specific Gravity Urine 1.010 1.003 - 1.035    Blood Urine Moderate (A) NEG^Negative    pH Urine 5.0 5.0 - 7.0 pH    Protein Albumin Urine Negative NEG^Negative mg/dL    Urobilinogen Urine 0.2 0.2 - 1.0 EU/dL    Nitrite Urine Negative NEG^Negative    Leukocyte Esterase Urine Moderate (A) NEG^Negative    Source Midstream Urine    Urine Microscopic   Result Value Ref Range    WBC Urine 10-25 (A) OTO5^0 - 5 /HPF    RBC Urine 5-10 (A) OTO2^O - 2 /HPF    Squamous Epithelial /LPF Urine Many (A) FEW^Few /LPF    " Bacteria Urine Moderate (A) NEG^Negative /HPF       Assessment & Plan:   Breonna Caruso, 64 year old female who presents with:    Acute cystitis with hematuria  No signs of pyelonephritis at this time  - nitroFURantoin macrocrystal-monohydrate (MACROBID) 100 MG capsule  Dispense: 10 capsule; Refill: 0  - *UA reflex to Microscopic and Culture ([place] [facility] [place] [facility] (except [place] and [place])  - Urine Microscopic  - Urine Culture Aerobic Bacterial      Jean-Paul Cabrera MD   [place] [facility]    The use of Dragon/[employer] dictation services may have been used to construct the content in this note; any grammatical or spelling errors are non-intentional. Please contact the author of this note directly if you are in need of any clarification.

## 2019-09-17 NOTE — PATIENT INSTRUCTIONS
Take medication macrobid twice a day for 5 days    Symptoms should improve within the next 2-3 days. If no improvement, call or return for recommendation    Drink 4-6 glasses of water a day    If you develop flank pain, fevers, nausea/vomiting call immediately for assistance

## 2019-09-18 LAB
BACTERIA SPEC CULT: ABNORMAL
SPECIMEN SOURCE: ABNORMAL

## 2019-09-19 ENCOUNTER — TELEPHONE (OUTPATIENT)
Dept: URGENT CARE | Facility: URGENT CARE | Age: 64
End: 2019-09-19

## 2019-09-19 DIAGNOSIS — N30.01 ACUTE CYSTITIS WITH HEMATURIA: Primary | ICD-10-CM

## 2019-09-19 RX ORDER — CEFDINIR 300 MG/1
300 CAPSULE ORAL 2 TIMES DAILY
Qty: 14 CAPSULE | Refills: 0 | Status: SHIPPED | OUTPATIENT
Start: 2019-09-19 | End: 2019-12-26

## 2019-09-19 NOTE — TELEPHONE ENCOUNTER
----- Message from Katiuska Chaparro MA sent at 9/19/2019  3:01 PM CDT -----  No notes recorded by provider

## 2019-09-20 DIAGNOSIS — J30.9 RHINITIS, ALLERGIC: ICD-10-CM

## 2019-09-20 RX ORDER — FLUTICASONE PROPIONATE 50 MCG
SPRAY, SUSPENSION (ML) NASAL
Qty: 48 G | Refills: 3 | Status: SHIPPED | OUTPATIENT
Start: 2019-09-20 | End: 2019-12-30

## 2019-09-20 NOTE — TELEPHONE ENCOUNTER
Prescription approved per [facility] Refill Protocol.  Poonam Taylor RN, BSN

## 2019-09-20 NOTE — TELEPHONE ENCOUNTER
"Requested Prescriptions   Pending Prescriptions Disp Refills     fluticasone (FLONASE) 50 MCG/ACT nasal spray [Pharmacy Med Name: FLUTICASONE  SPR [id number]] 48 g 1     Sig: SPRAY 2 SPRAYS INTO EACH   NOSTRIL DAILY.     Last Written Prescription Date:  03/04/2019  Last Fill Quantity: 1 bottle,  # refills: 1   Last office visit: 8/6/2019 with prescribing provider:  08/16/2019   Future Office Visit:          Inhaled Steroids Protocol Passed - 9/20/2019  1:07 PM        Passed - Patient is age 12 or older        Passed - Asthma control assessment score within normal limits in last 6 months     Please review ACT score.           Passed - Medication is active on med list        Passed - Recent (6 mo) or future (30 days) visit within the authorizing provider's specialty     Patient had office visit in the last 6 months or has a visit in the next 30 days with authorizing provider or within the authorizing provider's specialty.  See \"Patient Info\" tab in inbasket, or \"Choose Columns\" in Meds & Orders section of the refill encounter.            Wili June XRT  "

## 2019-09-25 ENCOUNTER — ANCILLARY PROCEDURE (OUTPATIENT)
Dept: BONE DENSITY | Facility: CLINIC | Age: 64
End: 2019-09-25
Attending: PHYSICIAN ASSISTANT
Payer: COMMERCIAL

## 2019-09-25 DIAGNOSIS — Z78.0 POST-MENOPAUSAL: ICD-10-CM

## 2019-12-26 ENCOUNTER — ANCILLARY PROCEDURE (OUTPATIENT)
Dept: CT IMAGING | Facility: CLINIC | Age: 64
End: 2019-12-26
Attending: NURSE PRACTITIONER
Payer: COMMERCIAL

## 2019-12-26 ENCOUNTER — TELEPHONE (OUTPATIENT)
Dept: FAMILY MEDICINE | Facility: CLINIC | Age: 64
End: 2019-12-26

## 2019-12-26 ENCOUNTER — OFFICE VISIT (OUTPATIENT)
Dept: FAMILY MEDICINE | Facility: CLINIC | Age: 64
End: 2019-12-26
Payer: COMMERCIAL

## 2019-12-26 VITALS
TEMPERATURE: 97.5 F | DIASTOLIC BLOOD PRESSURE: 84 MMHG | OXYGEN SATURATION: 97 % | WEIGHT: 142 LBS | RESPIRATION RATE: 20 BRPM | HEIGHT: 66 IN | BODY MASS INDEX: 22.82 KG/M2 | HEART RATE: 81 BPM | SYSTOLIC BLOOD PRESSURE: 122 MMHG

## 2019-12-26 DIAGNOSIS — R10.11 RUQ ABDOMINAL PAIN: ICD-10-CM

## 2019-12-26 DIAGNOSIS — Z23 NEED FOR PNEUMOCOCCAL VACCINATION: ICD-10-CM

## 2019-12-26 DIAGNOSIS — R10.11 RUQ ABDOMINAL PAIN: Primary | ICD-10-CM

## 2019-12-26 DIAGNOSIS — K59.00 CONSTIPATION, UNSPECIFIED CONSTIPATION TYPE: ICD-10-CM

## 2019-12-26 LAB
ALBUMIN SERPL-MCNC: 4.1 G/DL (ref 3.4–5)
ALBUMIN UR-MCNC: NEGATIVE MG/DL
ALP SERPL-CCNC: 72 U/L (ref 40–150)
ALT SERPL W P-5'-P-CCNC: 27 U/L (ref 0–50)
ANION GAP SERPL CALCULATED.3IONS-SCNC: 4 MMOL/L (ref 3–14)
APPEARANCE UR: ABNORMAL
AST SERPL W P-5'-P-CCNC: 12 U/L (ref 0–45)
BACTERIA #/AREA URNS HPF: ABNORMAL /HPF
BASOPHILS # BLD AUTO: 0 10E9/L (ref 0–0.2)
BASOPHILS NFR BLD AUTO: 0.2 %
BILIRUB SERPL-MCNC: 0.3 MG/DL (ref 0.2–1.3)
BILIRUB UR QL STRIP: NEGATIVE
BUN SERPL-MCNC: 15 MG/DL (ref 7–30)
CALCIUM SERPL-MCNC: 9.6 MG/DL (ref 8.5–10.1)
CHLORIDE SERPL-SCNC: 105 MMOL/L (ref 94–109)
CO2 SERPL-SCNC: 32 MMOL/L (ref 20–32)
COLOR UR AUTO: YELLOW
CREAT SERPL-MCNC: 0.65 MG/DL (ref 0.52–1.04)
DIFFERENTIAL METHOD BLD: NORMAL
EOSINOPHIL # BLD AUTO: 0 10E9/L (ref 0–0.7)
EOSINOPHIL NFR BLD AUTO: 0.9 %
ERYTHROCYTE [DISTWIDTH] IN BLOOD BY AUTOMATED COUNT: 13 % (ref 10–15)
GFR SERPL CREATININE-BSD FRML MDRD: >90 ML/MIN/{1.73_M2}
GGT SERPL-CCNC: 11 U/L (ref 0–40)
GLUCOSE SERPL-MCNC: 89 MG/DL (ref 70–99)
GLUCOSE UR STRIP-MCNC: NEGATIVE MG/DL
HCT VFR BLD AUTO: 43 % (ref 35–47)
HGB BLD-MCNC: 14.3 G/DL (ref 11.7–15.7)
HGB UR QL STRIP: NEGATIVE
KETONES UR STRIP-MCNC: NEGATIVE MG/DL
LEUKOCYTE ESTERASE UR QL STRIP: ABNORMAL
LYMPHOCYTES # BLD AUTO: 1.2 10E9/L (ref 0.8–5.3)
LYMPHOCYTES NFR BLD AUTO: 26.7 %
MCH RBC QN AUTO: 32.2 PG (ref 26.5–33)
MCHC RBC AUTO-ENTMCNC: 33.3 G/DL (ref 31.5–36.5)
MCV RBC AUTO: 97 FL (ref 78–100)
MONOCYTES # BLD AUTO: 0.4 10E9/L (ref 0–1.3)
MONOCYTES NFR BLD AUTO: 8.7 %
MUCOUS THREADS #/AREA URNS LPF: PRESENT /LPF
NEUTROPHILS # BLD AUTO: 2.9 10E9/L (ref 1.6–8.3)
NEUTROPHILS NFR BLD AUTO: 63.5 %
NITRATE UR QL: NEGATIVE
NON-SQ EPI CELLS #/AREA URNS LPF: ABNORMAL /LPF
PH UR STRIP: 5 PH (ref 5–7)
PLATELET # BLD AUTO: 262 10E9/L (ref 150–450)
POTASSIUM SERPL-SCNC: 4.1 MMOL/L (ref 3.4–5.3)
PROT SERPL-MCNC: 7.1 G/DL (ref 6.8–8.8)
RBC # BLD AUTO: 4.44 10E12/L (ref 3.8–5.2)
RBC #/AREA URNS AUTO: ABNORMAL /HPF
SODIUM SERPL-SCNC: 141 MMOL/L (ref 133–144)
SOURCE: ABNORMAL
SP GR UR STRIP: >1.03 (ref 1–1.03)
UROBILINOGEN UR STRIP-ACNC: 0.2 EU/DL (ref 0.2–1)
WBC # BLD AUTO: 4.5 10E9/L (ref 4–11)
WBC #/AREA URNS AUTO: ABNORMAL /HPF

## 2019-12-26 PROCEDURE — 36415 COLL VENOUS BLD VENIPUNCTURE: CPT | Performed by: NURSE PRACTITIONER

## 2019-12-26 PROCEDURE — 82977 ASSAY OF GGT: CPT | Performed by: NURSE PRACTITIONER

## 2019-12-26 PROCEDURE — G0009 ADMIN PNEUMOCOCCAL VACCINE: HCPCS | Performed by: NURSE PRACTITIONER

## 2019-12-26 PROCEDURE — 80053 COMPREHEN METABOLIC PANEL: CPT | Performed by: NURSE PRACTITIONER

## 2019-12-26 PROCEDURE — 85025 COMPLETE CBC W/AUTO DIFF WBC: CPT | Performed by: NURSE PRACTITIONER

## 2019-12-26 PROCEDURE — 99214 OFFICE O/P EST MOD 30 MIN: CPT | Mod: 25 | Performed by: NURSE PRACTITIONER

## 2019-12-26 PROCEDURE — 90670 PCV13 VACCINE IM: CPT | Performed by: NURSE PRACTITIONER

## 2019-12-26 PROCEDURE — 87086 URINE CULTURE/COLONY COUNT: CPT | Performed by: NURSE PRACTITIONER

## 2019-12-26 PROCEDURE — 81001 URINALYSIS AUTO W/SCOPE: CPT | Performed by: NURSE PRACTITIONER

## 2019-12-26 PROCEDURE — 74177 CT ABD & PELVIS W/CONTRAST: CPT | Mod: TC

## 2019-12-26 RX ORDER — RIVAROXABAN 20 MG/1
TABLET, FILM COATED ORAL
Refills: 1 | COMMUNITY
Start: 2019-02-23 | End: 2019-12-26

## 2019-12-26 RX ORDER — IOPAMIDOL 755 MG/ML
200 INJECTION, SOLUTION INTRAVASCULAR ONCE
Status: COMPLETED | OUTPATIENT
Start: 2019-12-26 | End: 2019-12-26

## 2019-12-26 RX ORDER — DOCUSATE SODIUM 100 MG/1
100 CAPSULE, LIQUID FILLED ORAL 2 TIMES DAILY
Qty: 60 CAPSULE | Refills: 1 | Status: SHIPPED | OUTPATIENT
Start: 2019-12-26

## 2019-12-26 RX ADMIN — IOPAMIDOL 69 ML: 755 INJECTION, SOLUTION INTRAVASCULAR at 15:03

## 2019-12-26 RX ADMIN — Medication 67 ML: at 15:03

## 2019-12-26 ASSESSMENT — PAIN SCALES - GENERAL: PAINLEVEL: SEVERE PAIN (7)

## 2019-12-26 ASSESSMENT — MIFFLIN-ST. JEOR: SCORE: 1197.92

## 2019-12-26 NOTE — NURSING NOTE
Prior to immunization administration, verified patients identity using patient s name and date of birth. Please see Immunization Activity for additional information.     Screening Questionnaire for Adult Immunization    Are you sick today?   No   Do you have allergies to medications, food, a vaccine component or latex?   No   Have you ever had a serious reaction after receiving a vaccination?   No   Do you have a long-term health problem with heart, lung, kidney, or metabolic disease (e.g., diabetes), asthma, a blood disorder, no spleen, complement component deficiency, a cochlear implant, or a spinal fluid leak?  Are you on long-term aspirin therapy?   No   Do you have cancer, leukemia, HIV/AIDS, or any other immune system problem?   No   Do you have a parent, brother, or sister with an immune system problem?   No   In the past 3 months, have you taken medications that affect  your immune system, such as prednisone, other steroids, or anticancer drugs; drugs for the treatment of rheumatoid arthritis, Crohn s disease, or psoriasis; or have you had radiation treatments?   No   Have you had a seizure, or a brain or other nervous system problem?   No   During the past year, have you received a transfusion of blood or blood    products, or been given immune (gamma) globulin or antiviral drug?   No   For women: Are you pregnant or is there a chance you could become       pregnant during the next month?   No   Have you received any vaccinations in the past 4 weeks?   No     Immunization questionnaire answers were all negative.      Patient instructed to remain in clinic for 15 minutes afterwards, and to report any adverse reaction to me immediately.       Screening performed by Jenny Arzate MA on 12/26/2019 at 1:00 PM.

## 2019-12-26 NOTE — PATIENT INSTRUCTIONS
At [facility], we strive to deliver an exceptional experience to you, every time we see you.  If you receive a survey in the mail, please send us back your thoughts. We really do value your feedback.    Based on your medical history, these are the current health maintenance/preventive care services that you are due for (some may have been done at this visit.)  Health Maintenance Due   Topic Date Due     HIT-6  1955     ZOSTER IMMUNIZATION (1 of 2) 03/19/2004     ASTHMA ACTION PLAN  05/26/2016     PNEUMOCOCCAL IMMUNIZATION 65+ HIGH/HIGHEST RISK (1 of 2 - PCV13) 03/19/2019         Suggested websites for health information:  Www.[employer].org : Up to date and easily searchable information on multiple topics.  Www.medlineplus.gov : medication info, interactive tutorials, watch real surgeries online  Www.familydoctor.org : good info from the Academy of Family Physicians  Www.cdc.gov : public health info, travel advisories, epidemics (H1N1)  Www.aap.org : children's health info, normal development, vaccinations  Www.health.[facility].[place].us : [place] dept of health, public health issues in [place], N1N1    Your care team:                            Family Medicine Internal Medicine   MD Juan Moran MD Shantel Branch-Fleming, MD Katya Georgiev PA-C Nam Ho, MD Pediatrics   KALEE Rangel, YUSUF Medellin APRMD Beverley Downing CNP, MD Deborah Mielke, MD Kim Thein, APRN [facility]      Clinic hours: Monday - Thursday 7 am-7 pm; Fridays 7 am-5 pm.   Urgent care: Monday - Friday 11 am-9 pm; Saturday and Sunday 9 am-5 pm.  Pharmacy : Monday -Thursday 8 am-8 pm; Friday 8 am-6 pm; Saturday and Sunday 9 am-5 pm.     Clinic: [phone]   Pharmacy: [phone]

## 2019-12-26 NOTE — TELEPHONE ENCOUNTER
Please notify patient that her CT scan was normal.  However, CT scan can miss gallstones sometimes.  I would like her to do an abdominal ultrasound in addition.  Please have her call [phone] to schedule.  Thank you,  MARCELINA Bradley CNP

## 2019-12-26 NOTE — PROGRESS NOTES
"Subjective     Breonna Caruso is a 64 year old female who presents to clinic today for the following health issues:    HPI   New Patient/Transfer of Care        ABDOMINAL and FLANK   PAIN     Onset: 2-3 months    Description:   Character:varies - currently dull ache but has been throbbing or sharp pain  Location: right flank  Radiation: Back, right    Intensity: severe    Progression of Symptoms:  Intermittent, worsening x3 weeks    Accompanying Signs & Symptoms:  Fever/Chills?: YES- mild fever , chills - a few weeks ago, none now  Gas/Bloating: YES  Nausea: YES  Vomitting: YES- few times  Diarrhea?: YES  Constipation:YES- chronically.  Worse in the last few days  Dysuria or Hematuria: In 9/2019 seen in [facility] for UTI but not since   History:   Trauma: no   Previous similar pain: Hx diverticulitis pain but this pain is in a different area  Previous tests done: none    Precipitating factors:   Does the pain change with:     Food: YES- worse     BM: YES pelvic    Urination: YES pelvic    Alleviating factors:  none    Therapies Tried and outcome: Bernardo aspirin for body pain    LMP:  not applicable   When pain comes on patient describes abdomen as tender and bloated all over but also has pain to her RUQ which is more intense after eating.  Also describes lower pelvic pain when urinated and/or having a bowel movement.  Pain is significant enough to \"stop her in her tracks\" when walking if it comes on.    Has been worsening for the last few weeks.    Has history  Of hysterectomy- due to endometriosis- 1989  Appetite has been lower  Drinks only one glass of water a day    Patient Active Problem List   Diagnosis     GERD (gastroesophageal reflux disease)     Mild persistent asthma     Hyperlipidemia LDL goal <160     Migraine headache     Advanced directives, counseling/discussion     Family history of coronary artery disease     HTN, goal below 140/90     Major depressive disorder, recurrent episode, mild (HCC)     Past " Surgical History:   Procedure Laterality Date     COLONOSCOPY N/A 7/10/2015    Procedure: COLONOSCOPY;  Surgeon: Saul Aguilar MD;  Location: [facility]     HYSTERECTOMY VAGINAL  1994    endometriosis     REPAIR PTOSIS BILATERAL  1/4/2013    Procedure: REPAIR PTOSIS BILATERAL;  BILATERAL UPPER LID MECHANICAL PTOSIS REPAIR;  Surgeon: Derek Allred MD;  Location:  [facility]     SLING BLADDER SUSPENSION WITH FASCIA KAYLYN  2004    bladder sling      SURGICAL HISTORY OF -   2003    rt shoulder surgery        Social History     Tobacco Use     Smoking status: Never Smoker     Smokeless tobacco: Never Used   Substance Use Topics     Alcohol use: Yes     Comment: some wine/beer     Family History   Problem Relation Age of Onset     Lipids Mother      Heart Disease Mother         pacemaker     Heart Disease Father         heart attacks-multiple ones     Lipids Father         diabetes after stroke      Cerebrovascular Disease Father      Heart Disease Brother         3 total, one wtih open heart surgery at age of 55     Diabetes Brother         one brother with dm, other one with glucose intolerance     Breast Cancer No family hx of      Cancer - colorectal No family hx of      Colon Cancer No family hx of          Current Outpatient Medications   Medication Sig Dispense Refill     albuterol (PROAIR HFA/PROVENTIL HFA/VENTOLIN HFA) 108 (90 Base) MCG/ACT Inhaler Inhale 2 puffs into the lungs every 6 hours as needed for shortness of breath / dyspnea or wheezing 3 Inhaler 0     buPROPion (WELLBUTRIN SR) 200 MG 12 hr tablet TAKE 1 TABLET BY MOUTH TWICE A  tablet 3     docusate sodium (COLACE) 100 MG capsule Take 1 capsule (100 mg) by mouth 2 times daily 60 capsule 1     DULoxetine (CYMBALTA) 20 MG capsule Take 1 capsule (20 mg) by mouth daily 90 capsule 3     esomeprazole (NEXIUM) 20 MG capsule Take 1 capsule (20 mg) by mouth every morning (before breakfast) Take 30-60 minutes before eating. 30 capsule 0     fexofenadine  "(ALLEGRA) 180 MG tablet Take 1 tablet by mouth daily. 90 tablet 3     fluticasone (FLONASE) 50 MCG/ACT nasal spray SPRAY 2 SPRAYS INTO EACH   NOSTRIL DAILY. 48 g 3     lisinopril (PRINIVIL/ZESTRIL) 30 MG tablet Take 1 tablet (30 mg) by mouth daily 90 tablet 3     simvastatin (ZOCOR) 40 MG tablet TAKE ONE TABLET BY MOUTH   EVERY NIGHT AT BEDTIME 90 tablet 3     SUMAtriptan (IMITREX) 5 MG/ACT nasal spray Spray 1 spray (5 mg) in nostril as needed for migraine 1 Inhaler 12     traZODone (DESYREL) 50 MG tablet TAKE 1-2 TABLETS BY MOUTH EVERY NIGHT AS NEEDED FOR SLEEP 180 tablet 1     tretinoin (RETIN-A) 0.05 % cream Spread a pea size amount into affected area.  Use sunscreen SPF>20. 45 g 11     Vaginal Lubricant (REPLENS) GEL Apply small amount to vagina as needed 35 g 1     XIIDRA 5 % opthalmic solution INSTILL 1 DROP INTO BOTH EYES TWICE A DAY 12 HOURS APART  3     BP Readings from Last 3 Encounters:   12/26/19 122/84   09/16/19 (!) 150/80   08/06/19 110/70    Wt Readings from Last 3 Encounters:   12/26/19 64.4 kg (142 lb)   09/16/19 66 kg (145 lb 8 oz)   08/06/19 66.2 kg (146 lb)                      Reviewed and updated as needed this visit by Provider         Review of Systems   ROS COMP: Constitutional, HEENT, cardiovascular, pulmonary, gi and gu systems are negative, except as otherwise noted.      Objective    /84 (BP Location: Left arm, Patient Position: Chair, Cuff Size: Adult Regular)   Pulse 81   Temp 97.5  F (36.4  C) (Oral)   Resp 20   Ht 1.664 m (5' 5.5\")   Wt 64.4 kg (142 lb)   LMP  (LMP Unknown)   SpO2 97%   BMI 23.27 kg/m    Body mass index is 23.27 kg/m .  Physical Exam   GENERAL: healthy, alert and no distress  EYES: Eyes grossly normal to inspection, PERRL and conjunctivae and sclerae normal  HENT: ear canals and TM's normal, nose and mouth without ulcers or lesions  NECK: no adenopathy, no asymmetry, masses, or scars and thyroid normal to palpation  RESP: lungs clear to auscultation - " no rales, rhonchi or wheezes  CV: regular rate and rhythm, normal S1 S2, no S3 or S4, no murmur, click or rub, no peripheral edema and peripheral pulses strong  ABDOMEN: tenderness RUQ and suprapubic (R>L), no organomegaly or masses and bowel sounds normal  MS: no gross musculoskeletal defects noted, no edema  BACK: no CVA tenderness, no paralumbar tenderness    Diagnostic Test Results:  Results for orders placed or performed in visit on 12/26/19 (from the past 24 hour(s))   CBC with platelets and differential   Result Value Ref Range    WBC 4.5 4.0 - 11.0 10e9/L    RBC Count 4.44 3.8 - 5.2 10e12/L    Hemoglobin 14.3 11.7 - 15.7 g/dL    Hematocrit 43.0 35.0 - 47.0 %    MCV 97 78 - 100 fl    MCH 32.2 26.5 - 33.0 pg    MCHC 33.3 31.5 - 36.5 g/dL    RDW 13.0 10.0 - 15.0 %    Platelet Count 262 150 - 450 10e9/L    % Neutrophils 63.5 %    % Lymphocytes 26.7 %    % Monocytes 8.7 %    % Eosinophils 0.9 %    % Basophils 0.2 %    Absolute Neutrophil 2.9 1.6 - 8.3 10e9/L    Absolute Lymphocytes 1.2 0.8 - 5.3 10e9/L    Absolute Monocytes 0.4 0.0 - 1.3 10e9/L    Absolute Eosinophils 0.0 0.0 - 0.7 10e9/L    Absolute Basophils 0.0 0.0 - 0.2 10e9/L    Diff Method Automated Method    *UA reflex to Microscopic and Culture ([place] and [facility] (except [place] and [place])   Result Value Ref Range    Color Urine Yellow     Appearance Urine Slightly Cloudy     Glucose Urine Negative NEG^Negative mg/dL    Bilirubin Urine Negative NEG^Negative    Ketones Urine Negative NEG^Negative mg/dL    Specific Gravity Urine >1.030 1.003 - 1.035    Blood Urine Negative NEG^Negative    pH Urine 5.0 5.0 - 7.0 pH    Protein Albumin Urine Negative NEG^Negative mg/dL    Urobilinogen Urine 0.2 0.2 - 1.0 EU/dL    Nitrite Urine Negative NEG^Negative    Leukocyte Esterase Urine Small (A) NEG^Negative    Source Midstream Urine    Urine Microscopic   Result Value Ref Range    WBC Urine 5-10 (A) OTO5^0 - 5 /HPF    RBC Urine O - 2 OTO2^O - 2 /HPF     Squamous Epithelial /LPF Urine Few FEW^Few /LPF    Bacteria Urine Moderate (A) NEG^Negative /HPF    Mucous Urine Present (A) NEG^Negative /LPF           Assessment & Plan     1. RUQ abdominal pain  Suspicious for gallstones.  Will check labs and CT.  - CBC with platelets and differential  - Comprehensive metabolic panel (BMP + Alb, Alk Phos, ALT, AST, Total. Bili, TP)  - GGT  - CT Abdomen Pelvis w Contrast; Future  - *UA reflex to Microscopic and Culture ([place] and [facility] (except [place] and [place])  - Urine Microscopic  - Urine Culture Aerobic Bacterial    2. Constipation, unspecified constipation type  Advised increasing water intake, fiber intake and starting below.   - docusate sodium (COLACE) 100 MG capsule; Take 1 capsule (100 mg) by mouth 2 times daily  Dispense: 60 capsule; Refill: 1    3. Need for pneumococcal vaccination  - PCV13, IM (6+ WK) - [id number]       Patient Instructions     At [facility], we strive to deliver an exceptional experience to you, every time we see you.  If you receive a survey in the mail, please send us back your thoughts. We really do value your feedback.    Based on your medical history, these are the current health maintenance/preventive care services that you are due for (some may have been done at this visit.)  Health Maintenance Due   Topic Date Due     HIT-6  1955     ZOSTER IMMUNIZATION (1 of 2) 03/19/2004     ASTHMA ACTION PLAN  05/26/2016     PNEUMOCOCCAL IMMUNIZATION 65+ HIGH/HIGHEST RISK (1 of 2 - PCV13) 03/19/2019         Suggested websites for health information:  Www.[employer].org : Up to date and easily searchable information on multiple topics.  Www.medlineplus.gov : medication info, interactive tutorials, watch real surgeries online  Www.familydoctor.org : good info from the Academy of Family Physicians  Www.cdc.gov : public health info, travel advisories, epidemics (H1N1)  Www.aap.org : children's health info, normal  development, vaccinations  Www.health.[facility].[place].us : [place] dept of health, public health issues in [place], N1N1    Your care team:                            Family Medicine Internal Medicine   MD Juan Moran MD Shantel Branch-Fleming, MD Katya Georgiev PA-C Nam Ho, MD Pediatrics   KALEE Rangel, MD Beverley Jones CNP, MD Deborah Mielke, MD Kim Thein, APRN CNP      Clinic hours: Monday - Thursday 7 am-7 pm; Fridays 7 am-5 pm.   Urgent care: Monday - Friday 11 am-9 pm; Saturday and Sunday 9 am-5 pm.  Pharmacy : Monday -Thursday 8 am-8 pm; Friday 8 am-6 pm; Saturday and Sunday 9 am-5 pm.     Clinic: [phone]   Pharmacy: [phone]          No follow-ups on file.    MARCELINA Bradley CNP  [facility] [address]

## 2019-12-27 ENCOUNTER — TELEPHONE (OUTPATIENT)
Dept: FAMILY MEDICINE | Facility: CLINIC | Age: 64
End: 2019-12-27

## 2019-12-27 LAB
BACTERIA SPEC CULT: NORMAL
SPECIMEN SOURCE: NORMAL

## 2019-12-27 NOTE — TELEPHONE ENCOUNTER
----- Message from MARCELINA Briones CNP sent at 12/26/2019  4:37 PM CST -----  Please notify patient that her CT scan was normal.  However, CT scan can miss gallstones sometimes.  I would like her to do an abdominal ultrasound in addition.  Please have her call [phone] to schedule.  Thank you,  MARCELINA Bradley CNP

## 2019-12-30 ENCOUNTER — TELEPHONE (OUTPATIENT)
Dept: FAMILY MEDICINE | Facility: CLINIC | Age: 64
End: 2019-12-30

## 2019-12-30 ENCOUNTER — ANCILLARY PROCEDURE (OUTPATIENT)
Dept: ULTRASOUND IMAGING | Facility: CLINIC | Age: 64
End: 2019-12-30
Attending: NURSE PRACTITIONER
Payer: COMMERCIAL

## 2019-12-30 DIAGNOSIS — R10.11 RUQ ABDOMINAL PAIN: ICD-10-CM

## 2019-12-30 DIAGNOSIS — J30.9 RHINITIS, ALLERGIC: ICD-10-CM

## 2019-12-30 PROCEDURE — 76705 ECHO EXAM OF ABDOMEN: CPT

## 2019-12-30 RX ORDER — FLUTICASONE PROPIONATE 50 MCG
SPRAY, SUSPENSION (ML) NASAL
Qty: 48 G | Refills: 9 | Status: SHIPPED | OUTPATIENT
Start: 2019-12-30 | End: 2020-10-12

## 2019-12-30 NOTE — TELEPHONE ENCOUNTER
This writer attempted to contact patient on 12/30/19      Reason for call lab results and left message.      If patient calls back:   2nd floor [facility] Team (MA/TC) called. Inform patient that someone from the team will contact them, document that pt called and route to care team.         Mya Rodriguez MA

## 2019-12-31 NOTE — TELEPHONE ENCOUNTER
----- Message from MARCELINA Briones CNP sent at 12/30/2019  1:05 PM CST -----  Please call patient.  Her abdominal ultrasound is normal.  No sign of gallbladder issues.  There has not been any reason on CT to explain her pain.  Please have her continue to work on constipation as this could be the issue.  If pain persists, have her reach out and I'll place a GI referral. Thanks!  Melinda

## 2020-01-24 DIAGNOSIS — R68.82 DECREASED LIBIDO: ICD-10-CM

## 2020-01-24 RX ORDER — BUPROPION HYDROCHLORIDE 200 MG/1
TABLET, EXTENDED RELEASE ORAL
Qty: 180 TABLET | Refills: 1 | OUTPATIENT
Start: 2020-01-24

## 2020-06-07 DIAGNOSIS — I10 HTN, GOAL BELOW 140/90: ICD-10-CM

## 2020-06-08 RX ORDER — LISINOPRIL 30 MG/1
TABLET ORAL
Qty: 90 TABLET | Refills: 1 | OUTPATIENT
Start: 2020-06-08

## 2020-07-20 DIAGNOSIS — R68.82 DECREASED LIBIDO: ICD-10-CM

## 2020-07-20 RX ORDER — BUPROPION HYDROCHLORIDE 200 MG/1
TABLET, EXTENDED RELEASE ORAL
Qty: 180 TABLET | Refills: 0 | Status: SHIPPED | OUTPATIENT
Start: 2020-07-20 | End: 2020-10-12

## 2020-07-28 ENCOUNTER — ANCILLARY PROCEDURE (OUTPATIENT)
Dept: GENERAL RADIOLOGY | Facility: CLINIC | Age: 65
End: 2020-07-28
Attending: NURSE PRACTITIONER
Payer: COMMERCIAL

## 2020-07-28 ENCOUNTER — OFFICE VISIT (OUTPATIENT)
Dept: URGENT CARE | Facility: URGENT CARE | Age: 65
End: 2020-07-28
Payer: COMMERCIAL

## 2020-07-28 VITALS
OXYGEN SATURATION: 95 % | WEIGHT: 146 LBS | HEART RATE: 78 BPM | TEMPERATURE: 97.5 F | RESPIRATION RATE: 16 BRPM | SYSTOLIC BLOOD PRESSURE: 133 MMHG | BODY MASS INDEX: 23.93 KG/M2 | DIASTOLIC BLOOD PRESSURE: 80 MMHG

## 2020-07-28 DIAGNOSIS — N30.00 ACUTE CYSTITIS WITHOUT HEMATURIA: Primary | ICD-10-CM

## 2020-07-28 DIAGNOSIS — R82.90 NONSPECIFIC FINDING ON EXAMINATION OF URINE: ICD-10-CM

## 2020-07-28 DIAGNOSIS — J06.9 VIRAL UPPER RESPIRATORY TRACT INFECTION: ICD-10-CM

## 2020-07-28 DIAGNOSIS — R10.30 LOWER ABDOMINAL PAIN: ICD-10-CM

## 2020-07-28 LAB
ALBUMIN UR-MCNC: NEGATIVE MG/DL
APPEARANCE UR: CLEAR
BACTERIA #/AREA URNS HPF: ABNORMAL /HPF
BASOPHILS # BLD AUTO: 0 10E9/L (ref 0–0.2)
BASOPHILS NFR BLD AUTO: 0.2 %
BILIRUB UR QL STRIP: NEGATIVE
COLOR UR AUTO: YELLOW
DIFFERENTIAL METHOD BLD: NORMAL
EOSINOPHIL # BLD AUTO: 0 10E9/L (ref 0–0.7)
EOSINOPHIL NFR BLD AUTO: 0.4 %
ERYTHROCYTE [DISTWIDTH] IN BLOOD BY AUTOMATED COUNT: 12.4 % (ref 10–15)
GLUCOSE UR STRIP-MCNC: NEGATIVE MG/DL
HCT VFR BLD AUTO: 44.9 % (ref 35–47)
HGB BLD-MCNC: 14.5 G/DL (ref 11.7–15.7)
HGB UR QL STRIP: NEGATIVE
KETONES UR STRIP-MCNC: NEGATIVE MG/DL
LEUKOCYTE ESTERASE UR QL STRIP: ABNORMAL
LYMPHOCYTES # BLD AUTO: 1.2 10E9/L (ref 0.8–5.3)
LYMPHOCYTES NFR BLD AUTO: 14.6 %
MCH RBC QN AUTO: 30.6 PG (ref 26.5–33)
MCHC RBC AUTO-ENTMCNC: 32.3 G/DL (ref 31.5–36.5)
MCV RBC AUTO: 95 FL (ref 78–100)
MONOCYTES # BLD AUTO: 0.6 10E9/L (ref 0–1.3)
MONOCYTES NFR BLD AUTO: 7.5 %
NEUTROPHILS # BLD AUTO: 6.2 10E9/L (ref 1.6–8.3)
NEUTROPHILS NFR BLD AUTO: 77.3 %
NITRATE UR QL: NEGATIVE
NON-SQ EPI CELLS #/AREA URNS LPF: ABNORMAL /LPF
PH UR STRIP: 5.5 PH (ref 5–7)
PLATELET # BLD AUTO: 254 10E9/L (ref 150–450)
RBC # BLD AUTO: 4.74 10E12/L (ref 3.8–5.2)
RBC #/AREA URNS AUTO: ABNORMAL /HPF
SOURCE: ABNORMAL
SP GR UR STRIP: 1.02 (ref 1–1.03)
UROBILINOGEN UR STRIP-ACNC: 0.2 EU/DL (ref 0.2–1)
WBC # BLD AUTO: 8 10E9/L (ref 4–11)
WBC #/AREA URNS AUTO: ABNORMAL /HPF

## 2020-07-28 PROCEDURE — 74019 RADEX ABDOMEN 2 VIEWS: CPT

## 2020-07-28 PROCEDURE — 99214 OFFICE O/P EST MOD 30 MIN: CPT | Performed by: NURSE PRACTITIONER

## 2020-07-28 PROCEDURE — 81001 URINALYSIS AUTO W/SCOPE: CPT | Performed by: NURSE PRACTITIONER

## 2020-07-28 PROCEDURE — 87086 URINE CULTURE/COLONY COUNT: CPT | Performed by: NURSE PRACTITIONER

## 2020-07-28 PROCEDURE — 36415 COLL VENOUS BLD VENIPUNCTURE: CPT | Performed by: NURSE PRACTITIONER

## 2020-07-28 PROCEDURE — 85025 COMPLETE CBC W/AUTO DIFF WBC: CPT | Performed by: NURSE PRACTITIONER

## 2020-07-28 PROCEDURE — 80053 COMPREHEN METABOLIC PANEL: CPT | Performed by: NURSE PRACTITIONER

## 2020-07-28 RX ORDER — NITROFURANTOIN 25; 75 MG/1; MG/1
100 CAPSULE ORAL 2 TIMES DAILY
Qty: 10 CAPSULE | Refills: 0 | Status: SHIPPED | OUTPATIENT
Start: 2020-07-28 | End: 2020-08-02

## 2020-07-28 RX ORDER — MULTIPLE VITAMINS W/ MINERALS TAB 9MG-400MCG
1 TAB ORAL DAILY
COMMUNITY

## 2020-07-28 ASSESSMENT — ENCOUNTER SYMPTOMS
CHILLS: 0
NAUSEA: 0
DIARRHEA: 0
RHINORRHEA: 0
COUGH: 1
SHORTNESS OF BREATH: 0
SORE THROAT: 0
VOMITING: 0
CONSTIPATION: 1
DYSURIA: 1
ABDOMINAL PAIN: 1
FEVER: 0
HEADACHES: 0

## 2020-07-28 ASSESSMENT — PAIN SCALES - GENERAL: PAINLEVEL: EXTREME PAIN (8)

## 2020-07-28 NOTE — PATIENT INSTRUCTIONS
"  Patient Education     Bladder Infection, Female (Adult)    Urine is normally doesn't have any bacteria in it. But bacteria can get into the urinary tract from the skin around the rectum. Or they can travel in the blood from elsewhere in the body. Once they are in your urinary tract, they can cause infection in the urethra (urethritis), the bladder (cystitis), or the kidneys (pyelonephritis).  The most common place for an infection is in the bladder. This is called a bladder infection. This is one of the most common infections in women. Most bladder infections are easily treated. They are not serious unless the infection spreads to the kidney.  The phrases \"bladder infection,\" \"UTI,\" and \"cystitis\" are often used to describe the same thing. But they are not always the same. Cystitis is an inflammation of the bladder. The most common cause of cystitis is an infection.  Symptoms  The infection causes inflammation in the urethra and bladder. This causes many of the symptoms. The most common symptoms of a bladder infection are:    Pain or burning when urinating    Having to urinate more often than usual    Urgent need to urinate    Only a small amount of urine comes out    Blood in urine    Abdominal discomfort. This is usually in the lower abdomen above the pubic bone.    Cloudy urine    Strong- or bad-smelling urine    Unable to urinate (urinary retention)    Unable to hold urine in (urinary incontinence)    Fever    Loss of appetite    Confusion (in older adults)  Causes  Bladder infections are not contagious. You can't get one from someone else, from a toilet seat, or from sharing a bath.  The most common cause of bladder infections is bacteria from the bowels. The bacteria get onto the skin around the opening of the urethra. From there, they can get into the urine and travel up to the bladder, causing inflammation and infection. This usually happens because of:    Wiping improperly after urinating. Always wipe " from front to back.    Bowel incontinence    Pregnancy    Procedures such as having a catheter inserted    Older age    Not emptying your bladder. This can allow bacteria a chance to grow in your urine.    Dehydration    Constipation    Sex    Use of a diaphragm for birth control   Treatment  Bladder infections are diagnosed by a urine test. They are treated with antibiotics and usually clear up quickly without complications. Treatment helps prevent a more serious kidney infection.  Medicines  Medicines can help in the treatment of a bladder infection:    Take antibiotics until they are used up, even if you feel better. It is important to finish them to make sure the infection has cleared.    You can use acetaminophen or ibuprofen for pain, fever, or discomfort, unless another medicine was prescribed. If you have chronic liver or kidney disease, talk with your healthcare provider before using these medicines. Also talk with your provider if you've ever had a stomach ulcer or gastrointestinal bleeding, or are taking blood-thinner medicines.    If you are given phenazopydridine to reduce burning with urination, it will cause your urine to become a bright orange color. This can stain clothing.  Care and prevention  These self-care steps can help prevent future infections:    Drink plenty of fluids to prevent dehydration and flush out your bladder. Do this unless you must restrict fluids for other health reasons, or your doctor told you not to.    Proper cleaning after going to the bathroom is important. Wipe from front to back after using the toilet to prevent the spread of bacteria.    Urinate more often. Don't try to hold urine in for a long time.    Wear loose-fitting clothes and cotton underwear. Avoid tight-fitting pants.    Improve your diet and prevent constipation. Eat more fresh fruit and vegetables, and fiber, and less junk and fatty foods.    Avoid sex until your symptoms are gone.    Avoid caffeine,  alcohol, and spicy foods. These can irritate your bladder.    Urinate right after intercourse to flush out your bladder.    If you use birth control pills and have frequent bladder infections, discuss it with your doctor.  Follow-up care  Call your healthcare provider if all symptoms are not gone after 3 days of treatment. This is especially important if you have repeat infections.  If a culture was done, you will be told if your treatment needs to be changed. If directed, you can call to find out the results.  If X-rays were done, you will be told if the results will affect your treatment.  Call 911  Call 911 if any of the following occur:    Trouble breathing    Hard to wake up or confusion    Fainting or loss of consciousness    Rapid heart rate  When to seek medical advice  Call your healthcare provider right away if any of these occur:    Fever of 100.4 F (38.0 C) or higher, or as directed by your healthcare provider    Symptoms are not better by the third day of treatment    Back or belly (abdominal) pain that gets worse    Repeated vomiting, or unable to keep medicine down    Weakness or dizziness    Vaginal discharge    Pain, redness, or swelling in the outer vaginal area (labia)  Date Last Reviewed: 10/1/2016    [id number] [employer]. [address] [place] [address], [place] [address]. All rights reserved. This information is not intended as a substitute for professional medical care. Always follow your healthcare professional's instructions.

## 2020-07-28 NOTE — PROGRESS NOTES
SUBJECTIVE:   Breonna Caruso is a 65 year old female presenting with a chief complaint of   Chief Complaint   Patient presents with     Abdominal Pain     On and off for months, hurts to walk.       She is an established patient of [facility].    Abdominal Pain    Location: lower abdomen   Radiation: None.    Pain character: aching,   Severity: 8 on a scale of 1-10.    Duration: 1 month(s) on and off  Course of Illness: slowly progressive.  Exacerbated by: eating some foods with seeds, lettuce  Relieved by: nothing.  Associated Symptoms: constipation.  Female : Not applicable  Surgical History: JESSENIA     Review of Systems   Constitutional: Negative for chills and fever.   HENT: Negative for congestion, ear pain, rhinorrhea and sore throat.    Respiratory: Positive for cough. Negative for shortness of breath.    Gastrointestinal: Positive for abdominal pain and constipation. Negative for diarrhea, nausea and vomiting.   Genitourinary: Positive for dysuria.   Neurological: Negative for headaches.   All other systems reviewed and are negative.      Past Medical History:   Diagnosis Date     Acne 3/28/2011     Decreased libido 10/14/2013     Dyslipidemia 11/15/2010     Family history of coronary artery disease 1/18/2012     GERD (gastroesophageal reflux disease) 11/15/2010     Gestational diabetes 4/22/2011     HTN, goal below 140/90 9/29/2014     Hyperlipidemia LDL goal <160 2/23/2011     Impaired fasting glucose 3/28/2011     Low HDL (under 40) 1/28/2013     Migraine headache 4/22/2011     (Problem list name updated by automated process. Provider to review and confirm.)     Mild major depression (H) 12/22/2010     Mild persistent asthma 2/23/2011     Symptomatic states associated with artificial menopause 1/18/2012     Family History   Problem Relation Age of Onset     Lipids Mother      Heart Disease Mother         pacemaker     Heart Disease Father         heart attacks-multiple ones     Lipids Father          diabetes after stroke      Cerebrovascular Disease Father      Heart Disease Brother         3 total, one [facility] open heart surgery at age of 55     Diabetes Brother         one brother with dm, other one with glucose intolerance     Breast Cancer No family hx of      Cancer - colorectal No family hx of      Colon Cancer No family hx of      Current Outpatient Medications   Medication Sig Dispense Refill     albuterol (PROAIR HFA/PROVENTIL HFA/VENTOLIN HFA) 108 (90 Base) MCG/ACT Inhaler Inhale 2 puffs into the lungs every 6 hours as needed for shortness of breath / dyspnea or wheezing 3 Inhaler 0     buPROPion (WELLBUTRIN SR) 200 MG 12 hr tablet TAKE 1 TABLET BY MOUTH TWICE A  tablet 0     docusate sodium (COLACE) 100 MG capsule Take 1 capsule (100 mg) by mouth 2 times daily 60 capsule 1     DULoxetine (CYMBALTA) 20 MG capsule Take 1 capsule (20 mg) by mouth daily 90 capsule 3     esomeprazole (NEXIUM) 20 MG capsule Take 1 capsule (20 mg) by mouth every morning (before breakfast) Take 30-60 minutes before eating. 30 capsule 0     fexofenadine (ALLEGRA) 180 MG tablet Take 1 tablet by mouth daily. 90 tablet 3     fluticasone (FLONASE) 50 MCG/ACT nasal spray SPRAY 2 SPRAYS INTO EACH   NOSTRIL DAILY. 48 g 9     lisinopril (PRINIVIL/ZESTRIL) 30 MG tablet Take 1 tablet (30 mg) by mouth daily 90 tablet 3     multivitamin w/minerals (MULTI-VITAMIN) tablet Take 1 tablet by mouth daily       nitroFURantoin macrocrystal-monohydrate (MACROBID) 100 MG capsule Take 1 capsule (100 mg) by mouth 2 times daily for 5 days 10 capsule 0     simvastatin (ZOCOR) 40 MG tablet TAKE ONE TABLET BY MOUTH   EVERY NIGHT AT BEDTIME 90 tablet 3     SUMAtriptan (IMITREX) 5 MG/ACT nasal spray Spray 1 spray (5 mg) in nostril as needed for migraine 1 Inhaler 12     tretinoin (RETIN-A) 0.05 % cream Spread a pea size amount into affected area.  Use sunscreen SPF>20. 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  Covered for possible HSE, although initial CSF PCR negative  Repeat CSF only 2 WBCs, many RBCs  Clinically, pt improved- alert, answers appropriately, remains afebrile; no further Sz    Plan:  Continue empiric ACV IV- tolerating without side effects  Await further CSF results from today's specimen  Possible Bx next wk as noted  D/w pt and family at bedside AS NEEDED FOR SLEEP (Patient not taking: Reported on 7/28/2020) 180 tablet 1     Vaginal Lubricant (REPLENS) GEL Apply small amount to vagina as needed (Patient not taking: Reported on 7/28/2020) 35 g 1     XIIDRA 5 % opthalmic solution INSTILL 1 DROP INTO BOTH EYES TWICE A DAY 12 HOURS APART  3     Social History     Tobacco Use     Smoking status: Never Smoker     Smokeless tobacco: Never Used   Substance Use Topics     Alcohol use: Yes     Comment: some wine/beer       OBJECTIVE  /80 (BP Location: Left arm, Patient Position: Sitting, Cuff Size: Adult Regular)   Pulse 78   Temp 97.5  F (36.4  C) (Tympanic)   Resp 16   Wt 66.2 kg (146 lb)   LMP  (LMP Unknown)   SpO2 95%   BMI 23.93 kg/m      Physical Exam  Vitals signs and nursing note reviewed.   Constitutional:       General: She is not in acute distress.     Appearance: She is well-developed. She is not diaphoretic.   HENT:      Head: Normocephalic and atraumatic.      Right Ear: Tympanic membrane and external ear normal.      Left Ear: Tympanic membrane and external ear normal.      Nose: Congestion and rhinorrhea present.   Eyes:      Pupils: Pupils are equal, round, and reactive to light.   Neck:      Musculoskeletal: Normal range of motion and neck supple.   Pulmonary:      Effort: Pulmonary effort is normal. No respiratory distress.      Breath sounds: Normal breath sounds.   Lymphadenopathy:      Cervical: No cervical adenopathy.   Skin:     General: Skin is warm and dry.   Neurological:      Mental Status: She is alert.      Cranial Nerves: No cranial nerve deficit.         Labs:  Results for orders placed or performed in visit on 07/28/20 (from the past 24 hour(s))   XR Abdomen 2 Views    Narrative    ABDOMEN TWO VIEWS  7/28/2020 1:40 PM     HISTORY:  Frequent constipation, history of diverticulitis. Lower  abdominal pain.    COMPARISON: None.      Impression    IMPRESSION: Nonobstructive bowel. Pelvic phleboliths. No free air.  Clear lower  lungs.   CBC with platelets and differential   Result Value Ref Range    WBC 8.0 4.0 - 11.0 10e9/L    RBC Count 4.74 3.8 - 5.2 10e12/L    Hemoglobin 14.5 11.7 - 15.7 g/dL    Hematocrit 44.9 35.0 - 47.0 %    MCV 95 78 - 100 fl    MCH 30.6 26.5 - 33.0 pg    MCHC 32.3 31.5 - 36.5 g/dL    RDW 12.4 10.0 - 15.0 %    Platelet Count 254 150 - 450 10e9/L    % Neutrophils 77.3 %    % Lymphocytes 14.6 %    % Monocytes 7.5 %    % Eosinophils 0.4 %    % Basophils 0.2 %    Absolute Neutrophil 6.2 1.6 - 8.3 10e9/L    Absolute Lymphocytes 1.2 0.8 - 5.3 10e9/L    Absolute Monocytes 0.6 0.0 - 1.3 10e9/L    Absolute Eosinophils 0.0 0.0 - 0.7 10e9/L    Absolute Basophils 0.0 0.0 - 0.2 10e9/L    Diff Method Automated Method    *UA reflex to Microscopic and Culture ([facility] (except [place] and [place])    Specimen: Midstream Urine   Result Value Ref Range    Color Urine Yellow     Appearance Urine Clear     Glucose Urine Negative NEG^Negative mg/dL    Bilirubin Urine Negative NEG^Negative    Ketones Urine Negative NEG^Negative mg/dL    Specific Gravity Urine 1.025 1.003 - 1.035    Blood Urine Negative NEG^Negative    pH Urine 5.5 5.0 - 7.0 pH    Protein Albumin Urine Negative NEG^Negative mg/dL    Urobilinogen Urine 0.2 0.2 - 1.0 EU/dL    Nitrite Urine Negative NEG^Negative    Leukocyte Esterase Urine Moderate (A) NEG^Negative    Source Midstream Urine    Urine Microscopic   Result Value Ref Range    WBC Urine 10-25 (A) OTO5^0 - 5 /HPF    RBC Urine O - 2 OTO2^O - 2 /HPF    Squamous Epithelial /LPF Urine Moderate (A) FEW^Few /LPF    Bacteria Urine Few (A) NEG^Negative /HPF       ASSESSMENT:      ICD-10-CM    1. Lower abdominal pain  R10.30 CBC with platelets and differential     *UA reflex to Microscopic and Culture ([facility] (except [place] and [place])     Comprehensive metabolic panel (BMP + Alb, Alk Phos, ALT, AST, Total. Bili, TP)     XR Abdomen 2 Views     Urine Microscopic   2.  "Nonspecific finding on examination of urine  R82.90 Urine Culture Aerobic Bacterial   3. Acute cystitis without hematuria  N30.00 nitroFURantoin macrocrystal-monohydrate (MACROBID) 100 MG capsule   4. Viral upper respiratory tract infection  J06.9           PLAN:  I discussed lab results with the patient.  Plan of treatment is discussed.The benefits, risks and potential side effects of medications discussed. Black box warning discussed as relevant.  All questions are answered.  Instructions were given  to follow up immediately if any adverse reactions or worsening symptoms develop.   Understanding of plan of care was verbalized by patient      Patient Instructions     Patient Education     Bladder Infection, Female (Adult)    Urine is normally doesn't have any bacteria in it. But bacteria can get into the urinary tract from the skin around the rectum. Or they can travel in the blood from elsewhere in the body. Once they are in your urinary tract, they can cause infection in the urethra (urethritis), the bladder (cystitis), or the kidneys (pyelonephritis).  The most common place for an infection is in the bladder. This is called a bladder infection. This is one of the most common infections in women. Most bladder infections are easily treated. They are not serious unless the infection spreads to the kidney.  The phrases \"bladder infection,\" \"UTI,\" and \"cystitis\" are often used to describe the same thing. But they are not always the same. Cystitis is an inflammation of the bladder. The most common cause of cystitis is an infection.  Symptoms  The infection causes inflammation in the urethra and bladder. This causes many of the symptoms. The most common symptoms of a bladder infection are:    Pain or burning when urinating    Having to urinate more often than usual    Urgent need to urinate    Only a small amount of urine comes out    Blood in urine    Abdominal discomfort. This is usually in the lower abdomen above " the pubic bone.    Cloudy urine    Strong- or bad-smelling urine    Unable to urinate (urinary retention)    Unable to hold urine in (urinary incontinence)    Fever    Loss of appetite    Confusion (in older adults)  Causes  Bladder infections are not contagious. You can't get one from someone else, from a toilet seat, or from sharing a bath.  The most common cause of bladder infections is bacteria from the bowels. The bacteria get onto the skin around the opening of the urethra. From there, they can get into the urine and travel up to the bladder, causing inflammation and infection. This usually happens because of:    Wiping improperly after urinating. Always wipe from front to back.    Bowel incontinence    Pregnancy    Procedures such as having a catheter inserted    Older age    Not emptying your bladder. This can allow bacteria a chance to grow in your urine.    Dehydration    Constipation    Sex    Use of a diaphragm for birth control   Treatment  Bladder infections are diagnosed by a urine test. They are treated with antibiotics and usually clear up quickly without complications. Treatment helps prevent a more serious kidney infection.  Medicines  Medicines can help in the treatment of a bladder infection:    Take antibiotics until they are used up, even if you feel better. It is important to finish them to make sure the infection has cleared.    You can use acetaminophen or ibuprofen for pain, fever, or discomfort, unless another medicine was prescribed. If you have chronic liver or kidney disease, talk with your healthcare provider before using these medicines. Also talk with your provider if you've ever had a stomach ulcer or gastrointestinal bleeding, or are taking blood-thinner medicines.    If you are given phenazopydridine to reduce burning with urination, it will cause your urine to become a bright orange color. This can stain clothing.  Care and prevention  These self-care steps can help prevent  future infections:    Drink plenty of fluids to prevent dehydration and flush out your bladder. Do this unless you must restrict fluids for other health reasons, or your doctor told you not to.    Proper cleaning after going to the bathroom is important. Wipe from front to back after using the toilet to prevent the spread of bacteria.    Urinate more often. Don't try to hold urine in for a long time.    Wear loose-fitting clothes and cotton underwear. Avoid tight-fitting pants.    Improve your diet and prevent constipation. Eat more fresh fruit and vegetables, and fiber, and less junk and fatty foods.    Avoid sex until your symptoms are gone.    Avoid caffeine, alcohol, and spicy foods. These can irritate your bladder.    Urinate right after intercourse to flush out your bladder.    If you use birth control pills and have frequent bladder infections, discuss it with your doctor.  Follow-up care  Call your healthcare provider if all symptoms are not gone after 3 days of treatment. This is especially important if you have repeat infections.  If a culture was done, you will be told if your treatment needs to be changed. If directed, you can call to find out the results.  If X-rays were done, you will be told if the results will affect your treatment.  Call 911  Call 911 if any of the following occur:    Trouble breathing    Hard to wake up or confusion    Fainting or loss of consciousness    Rapid heart rate  When to seek medical advice  Call your healthcare provider right away if any of these occur:    Fever of 100.4 F (38.0 C) or higher, or as directed by your healthcare provider    Symptoms are not better by the third day of treatment    Back or belly (abdominal) pain that gets worse    Repeated vomiting, or unable to keep medicine down    Weakness or dizziness    Vaginal discharge    Pain, redness, or swelling in the outer vaginal area (labia)  Date Last Reviewed: 10/1/2016    2364-8647 [employer]  [address] [place] [address]. All rights reserved. This information is not intended as a substitute for professional medical care. Always follow your healthcare professional's instructions.

## 2020-07-29 LAB
ALBUMIN SERPL-MCNC: 3.9 G/DL (ref 3.4–5)
ALP SERPL-CCNC: 74 U/L (ref 40–150)
ALT SERPL W P-5'-P-CCNC: 26 U/L (ref 0–50)
ANION GAP SERPL CALCULATED.3IONS-SCNC: 6 MMOL/L (ref 3–14)
AST SERPL W P-5'-P-CCNC: 12 U/L (ref 0–45)
BACTERIA SPEC CULT: NO GROWTH
BILIRUB SERPL-MCNC: 0.3 MG/DL (ref 0.2–1.3)
BUN SERPL-MCNC: 17 MG/DL (ref 7–30)
CALCIUM SERPL-MCNC: 8.9 MG/DL (ref 8.5–10.1)
CHLORIDE SERPL-SCNC: 104 MMOL/L (ref 94–109)
CO2 SERPL-SCNC: 30 MMOL/L (ref 20–32)
CREAT SERPL-MCNC: 0.66 MG/DL (ref 0.52–1.04)
GFR SERPL CREATININE-BSD FRML MDRD: >90 ML/MIN/{1.73_M2}
GLUCOSE SERPL-MCNC: 94 MG/DL (ref 70–99)
Lab: NORMAL
POTASSIUM SERPL-SCNC: 3.9 MMOL/L (ref 3.4–5.3)
PROT SERPL-MCNC: 7.2 G/DL (ref 6.8–8.8)
SODIUM SERPL-SCNC: 140 MMOL/L (ref 133–144)
SPECIMEN SOURCE: NORMAL

## 2020-08-13 DIAGNOSIS — E78.5 HYPERLIPIDEMIA LDL GOAL <160: ICD-10-CM

## 2020-08-13 RX ORDER — SIMVASTATIN 40 MG
TABLET ORAL
Qty: 90 TABLET | Refills: 0 | Status: SHIPPED | OUTPATIENT
Start: 2020-08-13 | End: 2020-10-12

## 2020-08-13 NOTE — TELEPHONE ENCOUNTER
Medication is being filled for 1 time refill only due to:  Patient needs to be seen because it has been more than one year since last visit.     LM for call back to schedule     Delfina Tovar RN

## 2020-08-26 ENCOUNTER — TRANSFERRED RECORDS (OUTPATIENT)
Dept: HEALTH INFORMATION MANAGEMENT | Facility: CLINIC | Age: 65
End: 2020-08-26

## 2020-09-16 ENCOUNTER — E-VISIT (OUTPATIENT)
Dept: FAMILY MEDICINE | Facility: CLINIC | Age: 65
End: 2020-09-16
Payer: COMMERCIAL

## 2020-09-16 DIAGNOSIS — R30.0 DYSURIA: Primary | ICD-10-CM

## 2020-09-16 DIAGNOSIS — N39.0 ACUTE UTI (URINARY TRACT INFECTION): ICD-10-CM

## 2020-09-16 NOTE — TELEPHONE ENCOUNTER
Called patient to schedule lab.  States this was to go to [facility].  States they moved last October.  Intended for message to go to Melinda Rodriguez CNP.  Routing to that provider per patient request.  Shannon Ruiz RN

## 2020-09-17 ENCOUNTER — VIRTUAL VISIT (OUTPATIENT)
Dept: FAMILY MEDICINE | Facility: CLINIC | Age: 65
End: 2020-09-17
Payer: COMMERCIAL

## 2020-09-17 DIAGNOSIS — N95.1 VAGINAL DRYNESS, MENOPAUSAL: ICD-10-CM

## 2020-09-17 DIAGNOSIS — R42 VERTIGO: ICD-10-CM

## 2020-09-17 DIAGNOSIS — R30.0 DYSURIA: ICD-10-CM

## 2020-09-17 DIAGNOSIS — N30.00 ACUTE CYSTITIS WITHOUT HEMATURIA: Primary | ICD-10-CM

## 2020-09-17 LAB
ALBUMIN UR-MCNC: ABNORMAL MG/DL
APPEARANCE UR: ABNORMAL
BACTERIA #/AREA URNS HPF: ABNORMAL /HPF
BILIRUB UR QL STRIP: NEGATIVE
COLOR UR AUTO: YELLOW
GLUCOSE UR STRIP-MCNC: NEGATIVE MG/DL
HGB UR QL STRIP: NEGATIVE
KETONES UR STRIP-MCNC: NEGATIVE MG/DL
LEUKOCYTE ESTERASE UR QL STRIP: ABNORMAL
MUCOUS THREADS #/AREA URNS LPF: PRESENT /LPF
NITRATE UR QL: NEGATIVE
NON-SQ EPI CELLS #/AREA URNS LPF: ABNORMAL /LPF
PH UR STRIP: 6 PH (ref 5–7)
RBC #/AREA URNS AUTO: ABNORMAL /HPF
SOURCE: ABNORMAL
SP GR UR STRIP: >1.03 (ref 1–1.03)
UROBILINOGEN UR STRIP-ACNC: 1 EU/DL (ref 0.2–1)
WBC #/AREA URNS AUTO: >100 /HPF

## 2020-09-17 PROCEDURE — 81001 URINALYSIS AUTO W/SCOPE: CPT | Performed by: NURSE PRACTITIONER

## 2020-09-17 PROCEDURE — 99213 OFFICE O/P EST LOW 20 MIN: CPT | Mod: 95 | Performed by: NURSE PRACTITIONER

## 2020-09-17 PROCEDURE — 87086 URINE CULTURE/COLONY COUNT: CPT | Performed by: NURSE PRACTITIONER

## 2020-09-17 RX ORDER — GLYCERIN/MIN OIL/POLYCARBOPHIL
GEL WITH APPLICATOR (GRAM) VAGINAL
Qty: 35 G | Refills: 1 | Status: SHIPPED | OUTPATIENT
Start: 2020-09-17 | End: 2021-09-30

## 2020-09-17 RX ORDER — NITROFURANTOIN 25; 75 MG/1; MG/1
100 CAPSULE ORAL 2 TIMES DAILY
Qty: 14 CAPSULE | Refills: 0 | Status: SHIPPED | OUTPATIENT
Start: 2020-09-17 | End: 2020-09-24

## 2020-09-17 NOTE — PROGRESS NOTES
"Breonna Caruso is a 65 year old female who is being evaluated via a billable telephone visit.      The patient has been notified of following:     \"This telephone visit will be conducted via a call between you and your physician/provider. We have found that certain health care needs can be provided without the need for a physical exam.  This service lets us provide the care you need with a short phone conversation.  If a prescription is necessary we can send it directly to your pharmacy.  If lab work is needed we can place an order for that and you can then stop by our lab to have the test done at a later time.    Telephone visits are billed at different rates depending on your insurance coverage. During this emergency period, for some insurers they may be billed the same as an in-person visit.  Please reach out to your insurance provider with any questions.    If during the course of the call the physician/provider feels a telephone visit is not appropriate, you will not be charged for this service.\"    Patient has given verbal consent for Telephone visit?  Yes    What phone number would you like to be contacted at? [phone]    How would you like to obtain your AVS? [place]    Subjective     Breonna Caruso is a 65 year old female who presents via phone visit today for the following health issues:    HPI    Genitourinary - Female  Onset/Duration: 1 week  Description:   Painful urination (Dysuria): YES           Frequency: YES  Blood in urine (Hematuria): no  Delay in urine (Hesitency): no  Intensity: mild  Progression of Symptoms:  same  Accompanying Signs & Symptoms:  Fever/chills: no  Flank pain: no  Nausea and vomiting: no  Vaginal symptoms: none  Abdominal/Pelvic Pain: YES- lower pelvic pain  History:   History of frequent UTI s: YES  History of kidney stones: no  Sexually Active: YES   Possibility of pregnancy: No  Precipitating or alleviating factors: None  Therapies tried and outcome: AZO    No " vaginal discharge. This feels like when she had a UTI about 2 months ago. Wondering why she keeps getting them. She does urinate and clean up after intercourse. Denies STD concerns. Encouraged use of lubrication to see if it helps, otherwise may need to consider preventative medication post-intercourse      Vertigo: When laying to sitting or sitting to standing it is occuring. Past few weeks. No fever/chills in past few weeks. No COVID-19 exposure. No international travel or travel to other states with high COVID cases in the past month. No change in medications. This last occurred years ago, went away on its own.            Review of Systems   Constitutional, HEENT, cardiovascular, pulmonary, gi and gu-as above, neuro as above, systems are negative, except as otherwise noted.       Objective          Vitals:  No vitals were obtained today due to virtual visit.    healthy, alert and no distress  PSYCH: Alert and oriented times 3; coherent speech, normal   rate and volume, able to articulate logical thoughts, able   to abstract reason, no tangential thoughts, no hallucinations   or delusions  Her affect is normal  RESP: No cough, no audible wheezing, able to talk in full sentences  Remainder of exam unable to be completed due to telephone visits    No results found for this or any previous visit (from the past 24 hour(s)).        Assessment/Plan:    Assessment & Plan     Acute cystitis without hematuria  Try to leave urine sample prior to starting antibiotic. If unable to leave urine sample and antibiotic not working within 5 days, needs to be seen in person and/or need urine sample prior to further antibiotics  - nitroFURantoin macrocrystal-monohydrate (MACROBID) 100 MG capsule; Take 1 capsule (100 mg) by mouth 2 times daily for 7 days    Vertigo  Trial for vertigo, if not improving needs to be seen in person. Labs normal 2 months ago  - PHYSICAL THERAPY REFERRAL; Future    Vaginal dryness, menopausal  Trial to  reduce frequent UTIs. If on-going UTIs, consider preventative treatment post intercourse prn  - Vaginal Lubricant (REPLENS) GEL; Apply small amount to vagina as needed       Return in about 1 week (around 9/24/2020), or if symptoms worsen or fail to improve.    MARCELINA Pollard, NP-C  [facility]    Phone call duration:  8 minutes

## 2020-09-18 LAB
BACTERIA SPEC CULT: NO GROWTH
Lab: NORMAL
SPECIMEN SOURCE: NORMAL

## 2020-09-18 NOTE — TELEPHONE ENCOUNTER
Provider E-Visit time total (minutes): no charge, patient had virtual visit yesterday to address labs.  MARCELINA Bradley CNP

## 2020-09-29 ENCOUNTER — TELEPHONE (OUTPATIENT)
Dept: FAMILY MEDICINE | Facility: CLINIC | Age: 65
End: 2020-09-29

## 2020-09-29 DIAGNOSIS — I10 HTN, GOAL BELOW 140/90: ICD-10-CM

## 2020-09-29 NOTE — TELEPHONE ENCOUNTER
Routing refill request to provider for review/approval because:  A break in medication  Janna Foster RN

## 2020-09-29 NOTE — TELEPHONE ENCOUNTER
.Reason for call:  Medication   If this is a refill request, has the caller requested the refill from the pharmacy already? No  Will the patient be using a [facility]? No  Name of the pharmacy and phone number for the current request: [facility] in [place]    Name of the medication requested: lisinopril 30mg    Other request: pt is leaving out of town tomorrow and needs this filled ASAP.    Phone number to reach patient:  Home number on file [phone] (home)    Best Time:  anytime    Can we leave a detailed message on this number?  YES    Travel screening: Negative

## 2020-09-30 RX ORDER — LISINOPRIL 30 MG/1
30 TABLET ORAL DAILY
Qty: 15 TABLET | Refills: 0 | Status: SHIPPED | OUTPATIENT
Start: 2020-09-30 | End: 2020-10-12

## 2020-09-30 NOTE — TELEPHONE ENCOUNTER
Called and spoke to the patient and scheduled an Annual Wellness visit for 10/12/2020 at 10:00 am with Melinda Rodriguez.  Judy Hernandez [facility]  2nd Floor  Primary Care

## 2020-10-12 ENCOUNTER — OFFICE VISIT (OUTPATIENT)
Dept: FAMILY MEDICINE | Facility: CLINIC | Age: 65
End: 2020-10-12
Payer: COMMERCIAL

## 2020-10-12 VITALS
OXYGEN SATURATION: 94 % | BODY MASS INDEX: 24.27 KG/M2 | WEIGHT: 151 LBS | HEART RATE: 78 BPM | DIASTOLIC BLOOD PRESSURE: 80 MMHG | HEIGHT: 66 IN | SYSTOLIC BLOOD PRESSURE: 130 MMHG

## 2020-10-12 DIAGNOSIS — D70.9 NEUTROPENIC FEVER (H): ICD-10-CM

## 2020-10-12 DIAGNOSIS — Z23 NEED FOR SHINGLES VACCINE: ICD-10-CM

## 2020-10-12 DIAGNOSIS — N39.0 RECURRENT UTI: ICD-10-CM

## 2020-10-12 DIAGNOSIS — G47.00 PERSISTENT INSOMNIA: ICD-10-CM

## 2020-10-12 DIAGNOSIS — Z00.00 ENCOUNTER FOR MEDICARE ANNUAL WELLNESS EXAM: Primary | ICD-10-CM

## 2020-10-12 DIAGNOSIS — Z23 NEED FOR PNEUMOCOCCAL VACCINATION: ICD-10-CM

## 2020-10-12 DIAGNOSIS — J30.1 SEASONAL ALLERGIC RHINITIS DUE TO POLLEN: ICD-10-CM

## 2020-10-12 DIAGNOSIS — I10 HTN, GOAL BELOW 140/90: ICD-10-CM

## 2020-10-12 DIAGNOSIS — R68.82 DECREASED LIBIDO: ICD-10-CM

## 2020-10-12 DIAGNOSIS — R50.81 NEUTROPENIC FEVER (H): ICD-10-CM

## 2020-10-12 DIAGNOSIS — F33.0 MAJOR DEPRESSIVE DISORDER, RECURRENT EPISODE, MILD (H): ICD-10-CM

## 2020-10-12 DIAGNOSIS — E78.5 HYPERLIPIDEMIA LDL GOAL <160: ICD-10-CM

## 2020-10-12 DIAGNOSIS — R42 VERTIGO: ICD-10-CM

## 2020-10-12 PROCEDURE — G0009 ADMIN PNEUMOCOCCAL VACCINE: HCPCS | Mod: 59 | Performed by: NURSE PRACTITIONER

## 2020-10-12 PROCEDURE — 90732 PPSV23 VACC 2 YRS+ SUBQ/IM: CPT | Performed by: NURSE PRACTITIONER

## 2020-10-12 PROCEDURE — 90471 IMMUNIZATION ADMIN: CPT | Performed by: NURSE PRACTITIONER

## 2020-10-12 PROCEDURE — 99397 PER PM REEVAL EST PAT 65+ YR: CPT | Mod: 25 | Performed by: NURSE PRACTITIONER

## 2020-10-12 PROCEDURE — 99214 OFFICE O/P EST MOD 30 MIN: CPT | Mod: 25 | Performed by: NURSE PRACTITIONER

## 2020-10-12 PROCEDURE — 82043 UR ALBUMIN QUANTITATIVE: CPT | Performed by: NURSE PRACTITIONER

## 2020-10-12 PROCEDURE — 90750 HZV VACC RECOMBINANT IM: CPT | Performed by: NURSE PRACTITIONER

## 2020-10-12 PROCEDURE — 36415 COLL VENOUS BLD VENIPUNCTURE: CPT | Performed by: NURSE PRACTITIONER

## 2020-10-12 PROCEDURE — 80061 LIPID PANEL: CPT | Performed by: NURSE PRACTITIONER

## 2020-10-12 RX ORDER — DULOXETIN HYDROCHLORIDE 20 MG/1
20 CAPSULE, DELAYED RELEASE ORAL DAILY
Qty: 90 CAPSULE | Refills: 3 | Status: CANCELLED | OUTPATIENT
Start: 2020-10-12

## 2020-10-12 RX ORDER — SILDENAFIL 50 MG/1
50 TABLET, FILM COATED ORAL DAILY PRN
Qty: 15 TABLET | Refills: 3 | Status: SHIPPED | OUTPATIENT
Start: 2020-10-12 | End: 2021-06-14

## 2020-10-12 RX ORDER — MECLIZINE HYDROCHLORIDE 25 MG/1
25 TABLET ORAL 3 TIMES DAILY PRN
Qty: 30 TABLET | Refills: 1 | Status: SHIPPED | OUTPATIENT
Start: 2020-10-12 | End: 2021-06-14

## 2020-10-12 RX ORDER — BUPROPION HYDROCHLORIDE 200 MG/1
200 TABLET, EXTENDED RELEASE ORAL 2 TIMES DAILY
Qty: 120 TABLET | Refills: 11 | Status: SHIPPED | OUTPATIENT
Start: 2020-10-12 | End: 2021-06-14 | Stop reason: DRUGHIGH

## 2020-10-12 RX ORDER — SIMVASTATIN 40 MG
40 TABLET ORAL AT BEDTIME
Qty: 90 TABLET | Refills: 3 | Status: SHIPPED | OUTPATIENT
Start: 2020-10-12 | End: 2021-09-30

## 2020-10-12 RX ORDER — TRAZODONE HYDROCHLORIDE 50 MG/1
TABLET, FILM COATED ORAL
Qty: 180 TABLET | Refills: 1 | Status: CANCELLED | OUTPATIENT
Start: 2020-10-12

## 2020-10-12 RX ORDER — NITROFURANTOIN 25; 75 MG/1; MG/1
CAPSULE ORAL
Qty: 30 CAPSULE | Refills: 3 | Status: SHIPPED | OUTPATIENT
Start: 2020-10-12 | End: 2023-09-20

## 2020-10-12 RX ORDER — LISINOPRIL 30 MG/1
30 TABLET ORAL DAILY
Qty: 90 TABLET | Refills: 3 | Status: SHIPPED | OUTPATIENT
Start: 2020-10-12 | End: 2021-09-30

## 2020-10-12 RX ORDER — FLUTICASONE PROPIONATE 50 MCG
SPRAY, SUSPENSION (ML) NASAL
Qty: 48 G | Refills: 9 | Status: SHIPPED | OUTPATIENT
Start: 2020-10-12 | End: 2021-12-13

## 2020-10-12 ASSESSMENT — ANXIETY QUESTIONNAIRES
2. NOT BEING ABLE TO STOP OR CONTROL WORRYING: NOT AT ALL
7. FEELING AFRAID AS IF SOMETHING AWFUL MIGHT HAPPEN: NOT AT ALL
5. BEING SO RESTLESS THAT IT IS HARD TO SIT STILL: NOT AT ALL
3. WORRYING TOO MUCH ABOUT DIFFERENT THINGS: NOT AT ALL
6. BECOMING EASILY ANNOYED OR IRRITABLE: NOT AT ALL
GAD7 TOTAL SCORE: 0
1. FEELING NERVOUS, ANXIOUS, OR ON EDGE: NOT AT ALL

## 2020-10-12 ASSESSMENT — PATIENT HEALTH QUESTIONNAIRE - PHQ9
5. POOR APPETITE OR OVEREATING: NOT AT ALL
SUM OF ALL RESPONSES TO PHQ QUESTIONS 1-9: 1

## 2020-10-12 ASSESSMENT — PAIN SCALES - GENERAL: PAINLEVEL: NO PAIN (0)

## 2020-10-12 ASSESSMENT — MIFFLIN-ST. JEOR: SCORE: 1233.74

## 2020-10-12 NOTE — PROGRESS NOTES
"  SUBJECTIVE:   Breonna Caruso is a 65 year old female who presents for Preventive Visit.      Patient has been advised of split billing requirements and indicates understanding: Yes  Are you in the first 12 months of your Medicare Part B coverage?  No    Physical Health:    In general, how would you rate your overall physical health? good    Outside of work, how many days during the week do you exercise? none    Outside of work, approximately how many minutes a day do you exercise?not applicable    If you drink alcohol do you typically have >3 drinks per day or >7 drinks per week? No    Do you usually eat at least 4 servings of fruit and vegetables a day, include whole grains & fiber and avoid regularly eating high fat or \"junk\" foods? Yes    Do you have any problems taking medications regularly?  No    Do you have any side effects from medications? none    Needs assistance for the following daily activities: no assistance needed    Which of the following safety concerns are present in your home?  none identified     Hearing impairment: No    In the past 6 months, have you been bothered by leaking of urine? yes    Mental Health:    In general, how would you rate your overall mental or emotional health? good  PHQ-2 Score:      Do you feel safe in your environment? YES    Have you ever done Advance Care Planning? (For example, a Health Directive, POLST, or a discussion with a medical provider or your loved ones about your wishes): No, advance care planning information given to patient to review.  Patient plans to discuss their wishes with loved ones or provider.      Additional concerns to address?  VERTIGO:  Still having episodes of this occasionally.  Worse when allergies are worse.  Denies headaches, vision changes, slurred speech, chest pain.    UTIs:  Patient states almost always occur after intercourse even with urinating right after, stopping use of lubricant, etc.  Would like post-coital prophylaxis.  "     Libido:  Wellbutrin did not see to help.  She'd like to stop Cymbalta as she does not need it for anxiety, depression, is well-controlled, and thinks it could contribute to anorgasmia.  She would like to try something else.     Fall risk:  Fallen 2 or more times in the past year?: No  Any fall with injury in the past year?: No    Cognitive Screenin) Repeat 3 items (Leader, Season, Table)  YES  2) Clock draw: NORMAL  3) 3 item recall: Recalls 3 objects  Results: NORMAL clock, 3 items recalled: COGNITIVE IMPAIRMENT LESS LIKELY    Mini-CogTM Copyright S Abraham. Licensed by the author for use in [facility]; reprinted with permission (edward@[facility]). All rights reserved.      Do you have sleep apnea, excessive snoring or daytime drowsiness?: yes            Reviewed and updated as needed this visit by clinical staff  Tobacco  Allergies  Meds  Problems  Med Hx  Surg Hx  Fam Hx  Soc Hx          Reviewed and updated as needed this visit by Provider                Social History     Tobacco Use     Smoking status: Never Smoker     Smokeless tobacco: Never Used   Substance Use Topics     Alcohol use: Yes     Comment: some wine/beer                           Current providers sharing in care for this patient include:   Patient Care Team:  Melinda Rodriguez APRN CNP as PCP - General (Nurse Practitioner - Family)  Melinda Rodriguez APRN CNP as Assigned PCP    The following health maintenance items are reviewed in Epic and correct as of today:  Health Maintenance   Topic Date Due     HIT-6  1955     ZOSTER IMMUNIZATION (1 of 2) 2004     ASTHMA ACTION PLAN  2016     ASTHMA CONTROL TEST  2020     PHQ-9  2020     Pneumococcal Vaccine: Pediatrics (0 to 5 Years) and At-Risk Patients (6 to 64 Years) (3 of 3 - PPSV23) 2020     FALL RISK ASSESSMENT  2020     Pneumococcal Vaccine: 65+ Years (2 of 2 - PPSV23) 2020     MEDICARE ANNUAL WELLNESS  VISIT  10/12/2021     DTAP/TDAP/TD IMMUNIZATION (2 - Td) 01/06/2022     MAMMO SCREENING  08/26/2022     DEXA  09/25/2022     LIPID  08/07/2024     COLORECTAL CANCER SCREENING  07/10/2025     ADVANCE CARE PLANNING  10/12/2025     HEPATITIS C SCREENING  Completed     DEPRESSION ACTION PLAN  Completed     MIGRAINE ACTION PLAN  Completed     INFLUENZA VACCINE  Completed     IPV IMMUNIZATION  Aged Out     MENINGITIS IMMUNIZATION  Aged Out     HEPATITIS B IMMUNIZATION  Aged Out     BP Readings from Last 3 Encounters:   10/12/20 130/80   07/28/20 133/80   12/26/19 122/84    Wt Readings from Last 3 Encounters:   10/12/20 68.5 kg (151 lb)   07/28/20 66.2 kg (146 lb)   12/26/19 64.4 kg (142 lb)                  Patient Active Problem List   Diagnosis     GERD (gastroesophageal reflux disease)     Mild persistent asthma     Hyperlipidemia LDL goal <160     Migraine headache     Advanced directives, counseling/discussion     Family history of coronary artery disease     Decreased libido     HTN, goal below 140/90     Major depressive disorder, recurrent episode, mild (HCC)     Neutropenic fever (H)     Recurrent UTI     Vertigo     Seasonal allergic rhinitis due to pollen     Past Surgical History:   Procedure Laterality Date     COLONOSCOPY N/A 7/10/2015    Procedure: COLONOSCOPY;  Surgeon: Saul Aguilar MD;  Location: [facility]     HYSTERECTOMY VAGINAL  1994    endometriosis     REPAIR PTOSIS BILATERAL  1/4/2013    Procedure: REPAIR PTOSIS BILATERAL;  BILATERAL UPPER LID MECHANICAL PTOSIS REPAIR;  Surgeon: Derek Allred MD;  Location:  [facility]     SLING BLADDER SUSPENSION WITH FASCIA KAYLYN  2004    bladder sling      SURGICAL HISTORY OF -   2003    rt shoulder surgery        Social History     Tobacco Use     Smoking status: Never Smoker     Smokeless tobacco: Never Used   Substance Use Topics     Alcohol use: Yes     Comment: some wine/beer     Family History   Problem Relation Age of Onset     Lipids Mother      Heart  Disease Mother         pacemaker     Heart Disease Father         heart attacks-multiple ones     Lipids Father         diabetes after stroke      Cerebrovascular Disease Father      Heart Disease Brother         3 total, one wtih open heart surgery at age of 55     Diabetes Brother         one brother with dm, other one with glucose intolerance     Breast Cancer No family hx of      Cancer - colorectal No family hx of      Colon Cancer No family hx of          Current Outpatient Medications   Medication Sig Dispense Refill     albuterol (PROAIR HFA/PROVENTIL HFA/VENTOLIN HFA) 108 (90 Base) MCG/ACT Inhaler Inhale 2 puffs into the lungs every 6 hours as needed for shortness of breath / dyspnea or wheezing 3 Inhaler 0     buPROPion (WELLBUTRIN SR) 200 MG 12 hr tablet Take 1 tablet (200 mg) by mouth 2 times daily 120 tablet 11     docusate sodium (COLACE) 100 MG capsule Take 1 capsule (100 mg) by mouth 2 times daily 60 capsule 1     esomeprazole (NEXIUM) 20 MG capsule Take 1 capsule (20 mg) by mouth every morning (before breakfast) Take 30-60 minutes before eating. 30 capsule 0     fexofenadine (ALLEGRA) 180 MG tablet Take 1 tablet by mouth daily. 90 tablet 3     fluticasone (FLONASE) 50 MCG/ACT nasal spray SPRAY 2 SPRAYS INTO EACH   NOSTRIL DAILY. 48 g 9     lisinopril (ZESTRIL) 30 MG tablet Take 1 tablet (30 mg) by mouth daily 90 tablet 3     meclizine (ANTIVERT) 25 MG tablet Take 1 tablet (25 mg) by mouth 3 times daily as needed for dizziness 30 tablet 1     multivitamin w/minerals (MULTI-VITAMIN) tablet Take 1 tablet by mouth daily       nitroFURantoin macrocrystal-monohydrate (MACROBID) 100 MG capsule Use within two hours after intercourse for UTI prevention. 30 capsule 3     sildenafil (VIAGRA) 50 MG tablet Take 1 tablet (50 mg) by mouth daily as needed 15 tablet 3     simvastatin (ZOCOR) 40 MG tablet Take 1 tablet (40 mg) by mouth At Bedtime 90 tablet 3     Vaginal Lubricant (REPLENS) GEL Apply small amount to  "vagina as needed 35 g 1     Allergies   Allergen Reactions     Ciprofloxacin Swelling     Facial swelling     Codeine Sulfate      rash     Lorcet [Hydrocodone-Acetaminophen]      Penicillin G      Rash, hives     Sulfa Drugs      Rash, hives     Pneumonia Vaccine:Adults age 65+ who have not received previous Pneumovax (PPSV23) or PCV13 as an adult: Should first be given PCV13 AND then should be given PPSV23 6-12 months after PCV13  Mammogram Screening: Mammogram Screening: Patient over age 50, mutual decision to screen reflected in health maintenance.  Last 3 Pap and HPV Results:   PAP / HPV 12/22/2010   PAP NIL       ROS:  Constitutional, HEENT, cardiovascular, pulmonary, GI, , musculoskeletal, neuro, skin, endocrine and psych systems are negative, except as otherwise noted.    OBJECTIVE:   /80 (BP Location: Left arm, Patient Position: Chair, Cuff Size: Adult Regular)   Pulse 78   Ht 1.664 m (5' 5.5\")   Wt 68.5 kg (151 lb)   LMP  (LMP Unknown)   SpO2 94%   BMI 24.75 kg/m   Estimated body mass index is 24.75 kg/m  as calculated from the following:    Height as of this encounter: 1.664 m (5' 5.5\").    Weight as of this encounter: 68.5 kg (151 lb).  EXAM:   GENERAL: healthy, alert and no distress  EYES: Eyes grossly normal to inspection, PERRL and conjunctivae and sclerae normal  HENT: ear canals and TM's normal, nose and mouth without ulcers or lesions  NECK: no adenopathy, no asymmetry, masses, or scars and thyroid normal to palpation  RESP: lungs clear to auscultation - no rales, rhonchi or wheezes  CV: regular rate and rhythm, normal S1 S2, no S3 or S4, no murmur, click or rub, no peripheral edema and peripheral pulses strong  ABDOMEN: soft, nontender, no hepatosplenomegaly, no masses and bowel sounds normal  MS: no gross musculoskeletal defects noted, no edema  SKIN: no suspicious lesions or rashes  NEURO: Normal strength and tone, mentation intact and speech normal  PSYCH: mentation appears " normal, affect normal/bright    Diagnostic Test Results:  Labs reviewed in Epic  No results found for any visits on 10/12/20.    ASSESSMENT / PLAN:   1. Encounter for Medicare annual wellness exam      2. Hyperlipidemia LDL goal <160  Labs today.  Refills provided.   - Lipid panel reflex to direct LDL Fasting  - simvastatin (ZOCOR) 40 MG tablet; Take 1 tablet (40 mg) by mouth At Bedtime  Dispense: 90 tablet; Refill: 3    3. HTN, goal below 140/90  Labs today.  At goal.  Refills provided.   - Albumin Random Urine Quantitative with Creat Ratio  - lisinopril (ZESTRIL) 30 MG tablet; Take 1 tablet (30 mg) by mouth daily  Dispense: 90 tablet; Refill: 3    4. Major depressive disorder, recurrent episode, mild (H)  Well controlled on Wellbutrin.  Stopping Cymbalta.  Is to follow up on this in 4-6 weeks to see how mood is without it.    - buPROPion (WELLBUTRIN SR) 200 MG 12 hr tablet; Take 1 tablet (200 mg) by mouth 2 times daily  Dispense: 120 tablet; Refill: 11    5. Decreased libido  Trial of viagra given some evidence that this is effective for women.  She will likely have to pay out of pocket.  Printed prescription for her. Discussed potential side effects.   - sildenafil (VIAGRA) 50 MG tablet; Take 1 tablet (50 mg) by mouth daily as needed  Dispense: 15 tablet; Refill: 3    6. Vertigo  Trial of below.    - meclizine (ANTIVERT) 25 MG tablet; Take 1 tablet (25 mg) by mouth 3 times daily as needed for dizziness  Dispense: 30 tablet; Refill: 1    7. Persistent insomnia  States using Melatonin.  Trazodone gave her terrible nightmares.     8. Recurrent UTI  For post-coital use:  - nitroFURantoin macrocrystal-monohydrate (MACROBID) 100 MG capsule; Use within two hours after intercourse for UTI prevention.  Dispense: 30 capsule; Refill: 3    9. Neutropenic fever (H)  Resolved.  Recent CBC normal.    10. Seasonal allergic rhinitis due to pollen  - fluticasone (FLONASE) 50 MCG/ACT nasal spray; SPRAY 2 SPRAYS INTO EACH   NOSTRIL  "DAILY.  Dispense: 48 g; Refill: 9    11. Need for shingles vaccine  1 of 2 today.   - ZOSTER VACCINE RECOMBINANT ADJUVANTED IM NJX    12. Need for pneumococcal vaccination  - PPSV23, IM/SUBQ (2+ YRS) - [id number]    Patient has been advised of split billing requirements and indicates understanding: Yes    COUNSELING:  Reviewed preventive health counseling, as reflected in patient instructions       Regular exercise       Healthy diet/nutrition       Vision screening       Dental care       Bladder control       Fall risk prevention       Immunizations    Vaccinated for: Pneumococcal and Zoster          Estimated body mass index is 24.75 kg/m  as calculated from the following:    Height as of this encounter: 1.664 m (5' 5.5\").    Weight as of this encounter: 68.5 kg (151 lb).        She reports that she has never smoked. She has never used smokeless tobacco.    Appropriate preventive services were discussed with this patient, including applicable screening as appropriate for cardiovascular disease, diabetes, osteopenia/osteoporosis, and glaucoma.  As appropriate for age/gender, discussed screening for colorectal cancer, prostate cancer, breast cancer, and cervical cancer. Checklist reviewing preventive services available has been given to the patient.    Reviewed patients plan of care and provided an AVS. The Intermediate Care Plan ( asthma action plan, low back pain action plan, and migraine action plan) for Breonna meets the Care Plan requirement. This Care Plan has been established and reviewed with the Patient.    Counseling Resources:  ATP IV Guidelines  Pooled Cohorts Equation Calculator  Breast Cancer Risk Calculator  BRCA-Related Cancer Risk Assessment: FHS-7 Tool  FRAX Risk Assessment  ICSI Preventive Guidelines  Dietary Guidelines for Americans, 2010  USDA's MyPlate  ASA Prophylaxis  Lung CA Screening    MARCELINA Bradley [facility]  "

## 2020-10-12 NOTE — PATIENT INSTRUCTIONS
Vertigo:  Meclizine three times a day as needed.  Ok to take with Allegra and Fluticasone    Depression:  Stop cymbalta.  See how you do over the next 4-6 weeks.  Continue Wellbutrin for now.    UTIs:  Macrobid one tablet within 2 hours of intercourse    Libido:  Viagra 1 tablet 1 hour prior to intercourse.    Patient Education   Personalized Prevention Plan  You are due for the preventive services outlined below.  Your care team is available to assist you in scheduling these services.  If you have already completed any of these items, please share that information with your care team to update in your medical record.  Health Maintenance Due   Topic Date Due     Headache Impact Test Assessment  1955     Zoster (Shingles) Vaccine (1 of 2) 03/19/2004     Asthma Action Plan - yearly  05/26/2016     Asthma Control Test  02/06/2020     Depression Assessment  02/06/2020     Pneumococcal Vaccine (3 of 3 - PPSV23) 02/20/2020     FALL RISK ASSESSMENT  08/06/2020

## 2020-10-12 NOTE — LETTER
My Asthma Action Plan    Name: Breonna Caruso   YOB: 1955  Date: 10/12/2020   My doctor: MARCELINA Bradley CNP   My clinic: [facility]        My Rescue Medicine:   Albuterol inhaler (Proair/Ventolin/Proventil HFA)  2-4 puffs EVERY 4 HOURS as needed. Use a spacer if recommended by your provider.   My Asthma Severity:   Intermittent / Exercise Induced  Know your asthma triggers: upper respiratory infections, dust mites, pollens and mold  None          GREEN ZONE   Good Control    I feel good    No cough or wheeze    Can work, sleep and play without asthma symptoms       Take your asthma control medicine every day.     1. If exercise triggers your asthma, take your rescue medication    15 minutes before exercise or sports, and    During exercise if you have asthma symptoms  2. Spacer to use with inhaler: If you have a spacer, make sure to use it with your inhaler             YELLOW ZONE Getting Worse  I have ANY of these:    I do not feel good    Cough or wheeze    Chest feels tight    Wake up at night   1. Keep taking your Green Zone medications  2. Start taking your rescue medicine:    every 20 minutes for up to 1 hour. Then every 4 hours for 24-48 hours.  3. If you stay in the Yellow Zone for more than 12-24 hours, contact your doctor.  4. If you do not return to the Green Zone in 12-24 hours or you get worse, start taking your oral steroid medicine if prescribed by your provider.           RED ZONE Medical Alert - Get Help  I have ANY of these:    I feel awful    Medicine is not helping    Breathing getting harder    Trouble walking or talking    Nose opens wide to breathe       1. Take your rescue medicine NOW  2. If your provider has prescribed an oral steroid medicine, start taking it NOW  3. Call your doctor NOW  4. If you are still in the Red Zone after 20 minutes and you have not reached your doctor:    Take your rescue medicine again and    Call 911 or go  to the emergency room right away    See your regular doctor within 2 weeks of an Emergency Room or Urgent Care visit for follow-up treatment.          Annual Reminders:  Meet with Asthma Educator,  Flu Shot in the Fall, consider Pneumonia Vaccination for patients with asthma (aged 19 and older).    Pharmacy:    [facility] - [place], [place] - [address]  [facility] - [place], [place] - [address] AT PORTAL TO REGISTER[address] [facility] SITES  [id number] - [place], [place] - [address] [address] - [place], [place] - [address] [facility]  [id number] - [place], [place] - [address] AT CORNER OF [facility]    Electronically signed by MARCELINA Bradley CNP   Date: 10/12/20                    Asthma Triggers  How To Control Things That Make Your Asthma Worse    Triggers are things that make your asthma worse.  Look at the list below to help you find your triggers and   what you can do about them. You can help prevent asthma flare-ups by staying away from your triggers.      Trigger                                                          What you can do   Cigarette Smoke  Tobacco smoke can make asthma worse. Do not allow smoking in your home, car or around you.  Be sure no one smokes at a child s day care or school.  If you smoke, ask your health care provider for ways to help you quit.  Ask family members to quit too.  Ask your health care provider for a referral to Quit Plan to help you quit smoking, or call 3-737-934-PLAN.     Colds, Flu, Bronchitis  These are common triggers of asthma. Wash your hands often.  Don t touch your eyes, nose or mouth.  Get a flu shot every year.     Dust Mites  These are tiny bugs that live in cloth or carpet. They are too small to see. Wash sheets and blankets in hot water every week.   Encase pillows and mattress in dust mite proof covers.  Avoid having carpet if you can. If you have carpet,  vacuum weekly.   Use a dust mask and HEPA vacuum.   Pollen and Outdoor Mold  Some people are allergic to trees, grass, or weed pollen, or molds. Try to keep your windows closed.  Limit time out doors when pollen count is high.   Ask you health care provider about taking medicine during allergy season.     Animal Dander  Some people are allergic to skin flakes, urine or saliva from pets with fur or feathers. Keep pets with fur or feathers out of your home.    If you can t keep the pet outdoors, then keep the pet out of your bedroom.  Keep the bedroom door closed.  Keep pets off cloth furniture and away from stuffed toys.     Mice, Rats, and Cockroaches  Some people are allergic to the waste from these pests.   Cover food and garbage.  Clean up spills and food crumbs.  Store grease in the refrigerator.   Keep food out of the bedroom.   Indoor Mold  This can be a trigger if your home has high moisture. Fix leaking faucets, pipes, or other sources of water.   Clean moldy surfaces.  Dehumidify basement if it is damp and smelly.   Smoke, Strong Odors, and Sprays  These can reduce air quality. Stay away from strong odors and sprays, such as perfume, powder, hair spray, paints, smoke incense, paint, cleaning products, candles and new carpet.   Exercise or Sports  Some people with asthma have this trigger. Be active!  Ask your doctor about taking medicine before sports or exercise to prevent symptoms.    Warm up for 5-10 minutes before and after sports or exercise.     Other Triggers of Asthma  Cold air:  Cover your nose and mouth with a scarf.  Sometimes laughing or crying can be a trigger.  Some medicines and food can trigger asthma.

## 2020-10-12 NOTE — NURSING NOTE
Prior to immunization administration, verified patients identity using patient s name and date of birth. Please see Immunization Activity for additional information.     Screening Questionnaire for Adult Immunization    Are you sick today?   No   Do you have allergies to medications, food, a vaccine component or latex?   Yes   Have you ever had a serious reaction after receiving a vaccination?   No   Do you have a long-term health problem with heart, lung, kidney, or metabolic disease (e.g., diabetes), asthma, a blood disorder, no spleen, complement component deficiency, a cochlear implant, or a spinal fluid leak?  Are you on long-term aspirin therapy?   No   Do you have cancer, leukemia, HIV/AIDS, or any other immune system problem?   No   Do you have a parent, brother, or sister with an immune system problem?   No   In the past 3 months, have you taken medications that affect  your immune system, such as prednisone, other steroids, or anticancer drugs; drugs for the treatment of rheumatoid arthritis, Crohn s disease, or psoriasis; or have you had radiation treatments?   No   Have you had a seizure, or a brain or other nervous system problem?   No   During the past year, have you received a transfusion of blood or blood    products, or been given immune (gamma) globulin or antiviral drug?   No   For women: Are you pregnant or is there a chance you could become       pregnant during the next month?   No   Have you received any vaccinations in the past 4 weeks?   Yes     Immunization questionnaire was positive for at least one answer.  Notified Melinda Rodriguez.        Per orders of Dr. Melinda Rodriguez, injection of Shingrix and Pneumovax given by Judith Bah MA. Patient instructed to remain in clinic for 15 minutes afterwards, and to report any adverse reaction to me immediately.       Screening performed by Judith Bah MA on 10/12/2020

## 2020-10-13 LAB
CHOLEST SERPL-MCNC: 210 MG/DL
CREAT UR-MCNC: 156 MG/DL
HDLC SERPL-MCNC: 71 MG/DL
LDLC SERPL CALC-MCNC: 116 MG/DL
MICROALBUMIN UR-MCNC: 12 MG/L
MICROALBUMIN/CREAT UR: 7.37 MG/G CR (ref 0–25)
NONHDLC SERPL-MCNC: 139 MG/DL
TRIGL SERPL-MCNC: 117 MG/DL

## 2020-10-13 ASSESSMENT — ASTHMA QUESTIONNAIRES: ACT_TOTALSCORE: 24

## 2020-10-13 ASSESSMENT — ANXIETY QUESTIONNAIRES: GAD7 TOTAL SCORE: 0

## 2020-10-18 ENCOUNTER — MYC MEDICAL ADVICE (OUTPATIENT)
Dept: FAMILY MEDICINE | Facility: CLINIC | Age: 65
End: 2020-10-18

## 2020-10-18 DIAGNOSIS — F33.0 MAJOR DEPRESSIVE DISORDER, RECURRENT EPISODE, MILD (H): Primary | ICD-10-CM

## 2020-10-18 DIAGNOSIS — R68.82 DECREASED LIBIDO: ICD-10-CM

## 2020-10-19 RX ORDER — BUPROPION HYDROCHLORIDE 300 MG/1
300 TABLET ORAL EVERY MORNING
Qty: 30 TABLET | Refills: 5 | Status: SHIPPED | OUTPATIENT
Start: 2020-10-19 | End: 2021-02-09

## 2020-10-22 RX ORDER — DULOXETIN HYDROCHLORIDE 20 MG/1
CAPSULE, DELAYED RELEASE ORAL
Qty: 90 CAPSULE | Refills: 1 | OUTPATIENT
Start: 2020-10-22

## 2021-03-10 DIAGNOSIS — F33.0 MAJOR DEPRESSIVE DISORDER, RECURRENT EPISODE, MILD (H): ICD-10-CM

## 2021-03-10 RX ORDER — BUPROPION HYDROCHLORIDE 300 MG/1
300 TABLET ORAL EVERY MORNING
Qty: 30 TABLET | Refills: 1 | OUTPATIENT
Start: 2021-03-10

## 2021-04-13 DIAGNOSIS — F33.0 MAJOR DEPRESSIVE DISORDER, RECURRENT EPISODE, MILD (H): ICD-10-CM

## 2021-04-13 RX ORDER — BUPROPION HYDROCHLORIDE 300 MG/1
300 TABLET ORAL EVERY MORNING
Qty: 90 TABLET | Refills: 1 | Status: SHIPPED | OUTPATIENT
Start: 2021-04-13 | End: 2021-09-30

## 2021-04-13 NOTE — TELEPHONE ENCOUNTER
Routing refill request to provider for review/approval because:  Patient needs to be seen because:  Depression.  PHQ 1/4/2019 8/6/2019 10/12/2020   PHQ-9 Total Score 1 0 1   Q9: Thoughts of better off dead/self-harm past 2 weeks Not at all Not at all Not at all     Danielle Perez RN

## 2021-06-02 ENCOUNTER — OFFICE VISIT (OUTPATIENT)
Dept: FAMILY MEDICINE | Facility: CLINIC | Age: 66
End: 2021-06-02
Payer: COMMERCIAL

## 2021-06-02 VITALS
WEIGHT: 148 LBS | HEART RATE: 66 BPM | OXYGEN SATURATION: 96 % | BODY MASS INDEX: 23.78 KG/M2 | DIASTOLIC BLOOD PRESSURE: 79 MMHG | HEIGHT: 66 IN | SYSTOLIC BLOOD PRESSURE: 130 MMHG

## 2021-06-02 DIAGNOSIS — K59.09 CHRONIC CONSTIPATION: ICD-10-CM

## 2021-06-02 DIAGNOSIS — R10.84 ABDOMINAL PAIN, GENERALIZED: Primary | ICD-10-CM

## 2021-06-02 DIAGNOSIS — N95.2 VAGINAL ATROPHY: ICD-10-CM

## 2021-06-02 DIAGNOSIS — F33.0 MAJOR DEPRESSIVE DISORDER, RECURRENT EPISODE, MILD (H): ICD-10-CM

## 2021-06-02 DIAGNOSIS — K57.30 DIVERTICULA OF COLON: ICD-10-CM

## 2021-06-02 PROBLEM — D70.9 NEUTROPENIC FEVER (H): Status: RESOLVED | Noted: 2020-10-12 | Resolved: 2021-06-02

## 2021-06-02 PROBLEM — R50.81 NEUTROPENIC FEVER (H): Status: RESOLVED | Noted: 2020-10-12 | Resolved: 2021-06-02

## 2021-06-02 LAB
ALBUMIN UR-MCNC: NEGATIVE MG/DL
APPEARANCE UR: CLEAR
BACTERIA #/AREA URNS HPF: ABNORMAL /HPF
BASOPHILS # BLD AUTO: 0 10E9/L (ref 0–0.2)
BASOPHILS NFR BLD AUTO: 0.7 %
BILIRUB UR QL STRIP: NEGATIVE
COLOR UR AUTO: YELLOW
DIFFERENTIAL METHOD BLD: NORMAL
EOSINOPHIL # BLD AUTO: 0 10E9/L (ref 0–0.7)
EOSINOPHIL NFR BLD AUTO: 0.7 %
ERYTHROCYTE [DISTWIDTH] IN BLOOD BY AUTOMATED COUNT: 12.6 % (ref 10–15)
GLUCOSE UR STRIP-MCNC: NEGATIVE MG/DL
HCT VFR BLD AUTO: 40.1 % (ref 35–47)
HGB BLD-MCNC: 13.4 G/DL (ref 11.7–15.7)
HGB UR QL STRIP: NEGATIVE
KETONES UR STRIP-MCNC: NEGATIVE MG/DL
LEUKOCYTE ESTERASE UR QL STRIP: ABNORMAL
LYMPHOCYTES # BLD AUTO: 1.5 10E9/L (ref 0.8–5.3)
LYMPHOCYTES NFR BLD AUTO: 34 %
MCH RBC QN AUTO: 31.2 PG (ref 26.5–33)
MCHC RBC AUTO-ENTMCNC: 33.4 G/DL (ref 31.5–36.5)
MCV RBC AUTO: 93 FL (ref 78–100)
MONOCYTES # BLD AUTO: 0.4 10E9/L (ref 0–1.3)
MONOCYTES NFR BLD AUTO: 8.1 %
NEUTROPHILS # BLD AUTO: 2.4 10E9/L (ref 1.6–8.3)
NEUTROPHILS NFR BLD AUTO: 56.5 %
NITRATE UR QL: NEGATIVE
NON-SQ EPI CELLS #/AREA URNS LPF: ABNORMAL /LPF
PH UR STRIP: 5.5 PH (ref 5–7)
PLATELET # BLD AUTO: 298 10E9/L (ref 150–450)
RBC # BLD AUTO: 4.3 10E12/L (ref 3.8–5.2)
RBC #/AREA URNS AUTO: ABNORMAL /HPF
SOURCE: ABNORMAL
SP GR UR STRIP: 1.01 (ref 1–1.03)
UROBILINOGEN UR STRIP-ACNC: 0.2 EU/DL (ref 0.2–1)
WBC # BLD AUTO: 4.3 10E9/L (ref 4–11)
WBC #/AREA URNS AUTO: ABNORMAL /HPF

## 2021-06-02 PROCEDURE — 36415 COLL VENOUS BLD VENIPUNCTURE: CPT | Performed by: NURSE PRACTITIONER

## 2021-06-02 PROCEDURE — 81001 URINALYSIS AUTO W/SCOPE: CPT | Performed by: NURSE PRACTITIONER

## 2021-06-02 PROCEDURE — 85025 COMPLETE CBC W/AUTO DIFF WBC: CPT | Performed by: NURSE PRACTITIONER

## 2021-06-02 PROCEDURE — 99214 OFFICE O/P EST MOD 30 MIN: CPT | Performed by: NURSE PRACTITIONER

## 2021-06-02 ASSESSMENT — MIFFLIN-ST. JEOR: SCORE: 1215.13

## 2021-06-02 ASSESSMENT — PAIN SCALES - GENERAL: PAINLEVEL: NO PAIN (0)

## 2021-06-02 NOTE — PATIENT INSTRUCTIONS
Metamucil daily  Continue stool softener  Follow-up with GI.      We will follow up on labs and the vaginal estrogen cream as well.

## 2021-06-02 NOTE — PROGRESS NOTES
Assessment & Plan     Abdominal pain, generalized  Normal CBC, pain actually resolved today so hold off on imaging and treatment.  Patient will see GI.  Advised again on constipation management.   - CBC with platelets and differential  - GASTROENTEROLOGY ADULT REF CONSULT ONLY; Future  - *UA reflex to Microscopic and Culture ([place] and [facility] (except [place] and [place])  - Urine Microscopic    Diverticula of colon  - CBC with platelets and differential  - GASTROENTEROLOGY ADULT REF CONSULT ONLY; Future    Chronic constipation  - GASTROENTEROLOGY ADULT REF CONSULT ONLY; Future    Vaginal atrophy  Cannot take estrogen.  Plan for gyn follow up.   - OB/GYN REFERRAL    Major depressive disorder, recurrent episode, mild (H)  Normal PHQ today.       Ordering of each unique test  Prescription drug management  25 minutes spent on the date of the encounter doing chart review, history and exam, documentation and further activities per the note       Patient Instructions   Metamucil daily  Continue stool softener  Follow-up with GI.      We will follow up on labs and the vaginal estrogen cream as well.       No follow-ups on file.    MARCELINA Bradley CNP  [facility] is a 66 year old who presents for the following health issues     HPI   Had diverticulitis last May 2014 per chart but patient states has had since then.  Recently February 2021.  Mid pelvic pain, tender, Thursday- SUnday.  Felt feverish, did not have thermometer  States if she had not been traveling, would have gone to emergency room>   Mild diarrhea, mild, no constipation.  No pain or tenderness, no fever, resolved Monday  Normal appetite, no nausea    Has bowel movement every 2-3 days        Review of Systems   Constitutional, HEENT, cardiovascular, pulmonary, GI, , musculoskeletal, neuro, skin, endocrine and psych systems are negative, except as otherwise noted.      Objective    BP  "130/79 (BP Location: Left arm, Patient Position: Chair, Cuff Size: Adult Large)   Pulse 66   Ht 1.664 m (5' 5.5\")   LMP  (LMP Unknown)   SpO2 96%   BMI 24.75 kg/m    Body mass index is 24.75 kg/m .  Physical Exam   GENERAL: healthy, alert and no distress  NECK: no adenopathy, no asymmetry, masses, or scars and thyroid normal to palpation  RESP: lungs clear to auscultation - no rales, rhonchi or wheezes  CV: regular rate and rhythm, normal S1 S2, no S3 or S4, no murmur, click or rub, no peripheral edema and peripheral pulses strong  ABDOMEN: soft, nontender, no hepatosplenomegaly, no masses and bowel sounds normal  MS: no gross musculoskeletal defects noted, no edema    Results for orders placed or performed in visit on 06/02/21   CBC with platelets and differential     Status: None   Result Value Ref Range    WBC 4.3 4.0 - 11.0 10e9/L    RBC Count 4.30 3.8 - 5.2 10e12/L    Hemoglobin 13.4 11.7 - 15.7 g/dL    Hematocrit 40.1 35.0 - 47.0 %    MCV 93 78 - 100 fl    MCH 31.2 26.5 - 33.0 pg    MCHC 33.4 31.5 - 36.5 g/dL    RDW 12.6 10.0 - 15.0 %    Platelet Count 298 150 - 450 10e9/L    % Neutrophils 56.5 %    % Lymphocytes 34.0 %    % Monocytes 8.1 %    % Eosinophils 0.7 %    % Basophils 0.7 %    Absolute Neutrophil 2.4 1.6 - 8.3 10e9/L    Absolute Lymphocytes 1.5 0.8 - 5.3 10e9/L    Absolute Monocytes 0.4 0.0 - 1.3 10e9/L    Absolute Eosinophils 0.0 0.0 - 0.7 10e9/L    Absolute Basophils 0.0 0.0 - 0.2 10e9/L    Diff Method Automated Method    *UA reflex to Microscopic and Culture ([place] and [facility] (except [place] and [place])     Status: Abnormal    Specimen: Midstream Urine   Result Value Ref Range    Color Urine Yellow     Appearance Urine Clear     Glucose Urine Negative NEG^Negative mg/dL    Bilirubin Urine Negative NEG^Negative    Ketones Urine Negative NEG^Negative mg/dL    Specific Gravity Urine 1.010 1.003 - 1.035    Blood Urine Negative NEG^Negative    pH Urine 5.5 5.0 - 7.0 pH    Protein " Albumin Urine Negative NEG^Negative mg/dL    Urobilinogen Urine 0.2 0.2 - 1.0 EU/dL    Nitrite Urine Negative NEG^Negative    Leukocyte Esterase Urine Trace (A) NEG^Negative    Source Midstream Urine    Urine Microscopic     Status: Abnormal   Result Value Ref Range    WBC Urine 0 - 5 OTO5^0 - 5 /HPF    RBC Urine O - 2 OTO2^O - 2 /HPF    Squamous Epithelial /LPF Urine Few FEW^Few /LPF    Bacteria Urine Few (A) NEG^Negative /HPF

## 2021-06-03 ASSESSMENT — ASTHMA QUESTIONNAIRES: ACT_TOTALSCORE: 25

## 2021-06-10 ENCOUNTER — MYC MEDICAL ADVICE (OUTPATIENT)
Dept: FAMILY MEDICINE | Facility: CLINIC | Age: 66
End: 2021-06-10

## 2021-06-11 ENCOUNTER — NURSE TRIAGE (OUTPATIENT)
Dept: FAMILY MEDICINE | Facility: CLINIC | Age: 66
End: 2021-06-11

## 2021-06-11 NOTE — TELEPHONE ENCOUNTER
"Patient called to schedule appointment for abdominal pain and was transferred to triage for assessment.    Patient states she has a history of diverticulosis with constipation and this type of pain is typical for her.  She is afebrile with intermittent nausea, but no vomiting.  Made an appointment with her PCP as advised per Roberto yesterday.  Home Care advise given.  Patient advised to call clinic clinic 24/7 or to go to UC/ED if she develops a fever, begins vomiting, abdominal pain worsens.Patient agrees.     Kalpana Sweeney, Triage RN  [facility]      Reason for Disposition    MODERATE OR MILD pain that comes and goes (cramps) lasts > 24 hours    Answer Assessment - Initial Assessment Questions  1. LOCATION: \"Where does it hurt?\"        Lower left, radiating around side to the left lower back    2. RADIATION: \"Does the pain shoot anywhere else?\" (e.g., chest, back)        As above    3. ONSET: \"When did the pain begin?\" (e.g., minutes, hours or days ago)         Yesterday 1 am,     4. SUDDEN: \"Gradual or sudden onset?\"        Had been okay until become constipated. Had been regular all week until that time.    5. PATTERN \"Does the pain come and go, or is it constant?\"       - Is constant: \"Is it getting better, staying the same, or worsening?\"         (Note: Constant means the pain never goes away completely; most serious pain is constant and it progresses)      - If intermittent: \"How long does it last?\" \"Do you have pain now?\"      (Note: Intermittent means the pain goes away completely between bouts)      PRetty constant. Hurst to walk and when sitting.  6. SEVERITY: \"How bad is the pain?\"  (e.g., Scale 1-10; mild, moderate, or severe)    - MILD (1-3): doesn't interfere with normal activities, abdomen soft and not tender to touch     - MODERATE (4-7): interferes with normal activities or awakens from sleep, tender to touch 7/10    - SEVERE (8-10): excruciating pain, doubled over, unable to do any " "normal activities        5-7/10    7. RECURRENT SYMPTOM: \"Have you ever had this type of abdominal pain before?\" If so, ask: \"When was the last time?\" and \"What happened that time?\"         Yes.    8. CAUSE: \"What do you think is causing the abdominal pain?\"        DIverticulosis. Hx diverticulitis Has gone to ED previously but never hospitalized    9. RELIEVING/AGGRAVATING FACTORS: \"What makes it better or worse?\" (e.g., movement, antacids, bowel movement)        Constipation    10. OTHER SYMPTOMS: \"Has there been any vomiting, diarrhea, constipation, or urine problems?\"          Aferbrile, some intermittent nausea, no vomiting, no urinary sx    11. PREGNANCY: \"Is there any chance you are pregnant?\" \"When was your last menstrual period?\"        No    Protocols used: ABDOMINAL PAIN - FEMALE-A-OH      "

## 2021-06-14 ENCOUNTER — ANCILLARY PROCEDURE (OUTPATIENT)
Dept: CT IMAGING | Facility: CLINIC | Age: 66
End: 2021-06-14
Attending: NURSE PRACTITIONER
Payer: COMMERCIAL

## 2021-06-14 ENCOUNTER — OFFICE VISIT (OUTPATIENT)
Dept: FAMILY MEDICINE | Facility: CLINIC | Age: 66
End: 2021-06-14
Payer: COMMERCIAL

## 2021-06-14 ENCOUNTER — TELEPHONE (OUTPATIENT)
Dept: FAMILY MEDICINE | Facility: CLINIC | Age: 66
End: 2021-06-14

## 2021-06-14 VITALS
WEIGHT: 148 LBS | HEIGHT: 66 IN | TEMPERATURE: 96.6 F | BODY MASS INDEX: 23.78 KG/M2 | SYSTOLIC BLOOD PRESSURE: 138 MMHG | OXYGEN SATURATION: 96 % | DIASTOLIC BLOOD PRESSURE: 77 MMHG | HEART RATE: 59 BPM

## 2021-06-14 DIAGNOSIS — R10.32 ABDOMINAL PAIN, LEFT LOWER QUADRANT: ICD-10-CM

## 2021-06-14 DIAGNOSIS — K57.32 DIVERTICULITIS OF COLON: Primary | ICD-10-CM

## 2021-06-14 DIAGNOSIS — K57.30 DIVERTICULOSIS OF LARGE INTESTINE WITHOUT HEMORRHAGE: ICD-10-CM

## 2021-06-14 DIAGNOSIS — R82.90 NONSPECIFIC FINDING ON EXAMINATION OF URINE: ICD-10-CM

## 2021-06-14 LAB
ALBUMIN SERPL-MCNC: 3.8 G/DL (ref 3.4–5)
ALBUMIN UR-MCNC: NEGATIVE MG/DL
ALP SERPL-CCNC: 68 U/L (ref 40–150)
ALT SERPL W P-5'-P-CCNC: 27 U/L (ref 0–50)
ANION GAP SERPL CALCULATED.3IONS-SCNC: 5 MMOL/L (ref 3–14)
APPEARANCE UR: ABNORMAL
AST SERPL W P-5'-P-CCNC: 10 U/L (ref 0–45)
BACTERIA #/AREA URNS HPF: ABNORMAL /HPF
BASOPHILS # BLD AUTO: 0 10E9/L (ref 0–0.2)
BASOPHILS NFR BLD AUTO: 0.6 %
BILIRUB DIRECT SERPL-MCNC: <0.1 MG/DL (ref 0–0.2)
BILIRUB SERPL-MCNC: 0.3 MG/DL (ref 0.2–1.3)
BILIRUB UR QL STRIP: NEGATIVE
BUN SERPL-MCNC: 15 MG/DL (ref 7–30)
CALCIUM SERPL-MCNC: 9 MG/DL (ref 8.5–10.1)
CHLORIDE SERPL-SCNC: 108 MMOL/L (ref 94–109)
CO2 SERPL-SCNC: 29 MMOL/L (ref 20–32)
COLOR UR AUTO: YELLOW
CREAT SERPL-MCNC: 0.71 MG/DL (ref 0.52–1.04)
DIFFERENTIAL METHOD BLD: ABNORMAL
EOSINOPHIL # BLD AUTO: 0 10E9/L (ref 0–0.7)
EOSINOPHIL NFR BLD AUTO: 1.2 %
ERYTHROCYTE [DISTWIDTH] IN BLOOD BY AUTOMATED COUNT: 12.8 % (ref 10–15)
GFR SERPL CREATININE-BSD FRML MDRD: 89 ML/MIN/{1.73_M2}
GLUCOSE SERPL-MCNC: 99 MG/DL (ref 70–99)
GLUCOSE UR STRIP-MCNC: NEGATIVE MG/DL
HCT VFR BLD AUTO: 41.3 % (ref 35–47)
HGB BLD-MCNC: 13.5 G/DL (ref 11.7–15.7)
HGB UR QL STRIP: NEGATIVE
KETONES UR STRIP-MCNC: NEGATIVE MG/DL
LEUKOCYTE ESTERASE UR QL STRIP: ABNORMAL
LYMPHOCYTES # BLD AUTO: 1.3 10E9/L (ref 0.8–5.3)
LYMPHOCYTES NFR BLD AUTO: 37.1 %
MCH RBC QN AUTO: 30.5 PG (ref 26.5–33)
MCHC RBC AUTO-ENTMCNC: 32.7 G/DL (ref 31.5–36.5)
MCV RBC AUTO: 93 FL (ref 78–100)
MONOCYTES # BLD AUTO: 0.3 10E9/L (ref 0–1.3)
MONOCYTES NFR BLD AUTO: 9.5 %
NEUTROPHILS # BLD AUTO: 1.7 10E9/L (ref 1.6–8.3)
NEUTROPHILS NFR BLD AUTO: 51.6 %
NITRATE UR QL: NEGATIVE
NON-SQ EPI CELLS #/AREA URNS LPF: ABNORMAL /LPF
PH UR STRIP: 6 PH (ref 5–7)
PLATELET # BLD AUTO: 253 10E9/L (ref 150–450)
POTASSIUM SERPL-SCNC: 4.1 MMOL/L (ref 3.4–5.3)
PROT SERPL-MCNC: 7.2 G/DL (ref 6.8–8.8)
RBC # BLD AUTO: 4.42 10E12/L (ref 3.8–5.2)
RBC #/AREA URNS AUTO: ABNORMAL /HPF
SODIUM SERPL-SCNC: 142 MMOL/L (ref 133–144)
SOURCE: ABNORMAL
SP GR UR STRIP: 1.01 (ref 1–1.03)
UROBILINOGEN UR STRIP-ACNC: 0.2 EU/DL (ref 0.2–1)
WBC # BLD AUTO: 3.4 10E9/L (ref 4–11)
WBC #/AREA URNS AUTO: ABNORMAL /HPF

## 2021-06-14 PROCEDURE — 80076 HEPATIC FUNCTION PANEL: CPT | Performed by: NURSE PRACTITIONER

## 2021-06-14 PROCEDURE — 74177 CT ABD & PELVIS W/CONTRAST: CPT | Mod: TC | Performed by: RADIOLOGY

## 2021-06-14 PROCEDURE — 81001 URINALYSIS AUTO W/SCOPE: CPT | Performed by: NURSE PRACTITIONER

## 2021-06-14 PROCEDURE — 85025 COMPLETE CBC W/AUTO DIFF WBC: CPT | Performed by: NURSE PRACTITIONER

## 2021-06-14 PROCEDURE — 87086 URINE CULTURE/COLONY COUNT: CPT | Performed by: NURSE PRACTITIONER

## 2021-06-14 PROCEDURE — 83516 IMMUNOASSAY NONANTIBODY: CPT | Performed by: NURSE PRACTITIONER

## 2021-06-14 PROCEDURE — 36415 COLL VENOUS BLD VENIPUNCTURE: CPT | Performed by: NURSE PRACTITIONER

## 2021-06-14 PROCEDURE — 80048 BASIC METABOLIC PNL TOTAL CA: CPT | Performed by: NURSE PRACTITIONER

## 2021-06-14 PROCEDURE — 83516 IMMUNOASSAY NONANTIBODY: CPT | Mod: 59 | Performed by: NURSE PRACTITIONER

## 2021-06-14 PROCEDURE — 99214 OFFICE O/P EST MOD 30 MIN: CPT | Performed by: NURSE PRACTITIONER

## 2021-06-14 RX ORDER — CEFDINIR 300 MG/1
300 CAPSULE ORAL 2 TIMES DAILY
Qty: 20 CAPSULE | Refills: 0 | Status: SHIPPED | OUTPATIENT
Start: 2021-06-14 | End: 2021-06-24

## 2021-06-14 RX ORDER — METRONIDAZOLE 500 MG/1
500 TABLET ORAL 3 TIMES DAILY
Qty: 30 TABLET | Refills: 0 | Status: SHIPPED | OUTPATIENT
Start: 2021-06-14 | End: 2021-06-24

## 2021-06-14 RX ORDER — IOPAMIDOL 755 MG/ML
500 INJECTION, SOLUTION INTRAVASCULAR ONCE
Status: COMPLETED | OUTPATIENT
Start: 2021-06-14 | End: 2021-06-14

## 2021-06-14 RX ADMIN — IOPAMIDOL 91 ML: 755 INJECTION, SOLUTION INTRAVASCULAR at 14:01

## 2021-06-14 RX ADMIN — Medication 60 ML: at 14:01

## 2021-06-14 ASSESSMENT — MIFFLIN-ST. JEOR: SCORE: 1215.13

## 2021-06-14 ASSESSMENT — PAIN SCALES - GENERAL: PAINLEVEL: MODERATE PAIN (5)

## 2021-06-14 NOTE — LETTER
June 14, 2021      Breonna Caruso  [address] [place] [id number]        To Whom It May Concern:    Breonna Caruso was seen in our clinic 6/14/2021.  Please excuse her from work 6/14/2021 and 6/16/21. She may return to work without restrictions after that.      Sincerely,        MARCELINA Bradley CNP

## 2021-06-14 NOTE — PROGRESS NOTES
Assessment & Plan     Diverticulitis of colon  Today's CT shows mild diverticulitis.  Appropriate for outpatient treatment. Multiple allergies to antibiotics.  Cefdinir not ideal but only remaining option.  Consider allergy referral for testing for true antibiotic allergy in the future.  Since has had other episodes of this in the past, patient is to meet with GI.  New referral entered to [facility] to see if she can get in sooner.  Also discussed, again, high fiber diet AND good hydration to prevent future episodes. .  - metroNIDAZOLE (FLAGYL) 500 MG tablet; Take 1 tablet (500 mg) by mouth 3 times daily for 10 days  - cefdinir (OMNICEF) 300 MG capsule; Take 1 capsule (300 mg) by mouth 2 times daily for 10 days    Abdominal pain, left lower quadrant  - GASTROENTEROLOGY ADULT REF CONSULT ONLY; Future  - GASTROENTEROLOGY ADULT REF CONSULT ONLY; Future  - CBC with platelets and differential  - Tissue transglutaminase kaur IgA and IgG  - Hepatic panel (Albumin, ALT, AST, Bili, Alk Phos, TP)  - Basic metabolic panel  (Ca, Cl, CO2, Creat, Gluc, K, Na, BUN)  - *UA reflex to Microscopic and Culture ([place] and [facility] (except [place] and [place])    Nonspecific finding on examination of urine  - Urine Culture Aerobic Bacterial    Ordering of each unique test  Prescription drug management  35 minutes spent on the date of the encounter doing chart review, history and exam, documentation and further activities per the note       There are no Patient Instructions on file for this visit.    No follow-ups on file.    MARCELINA Bradley CNP  [facility]    aKtie Ravi is a 66 year old who presents for the following health issues     HPI     Abdominal/Flank Pain  Onset/Duration: 6/10/21  Description:   Character: Sharp and Stabbing  Location: left lower quadrant aleena-umbilical region  Radiation: None  Intensity: moderate  Progression of Symptoms:  same  Accompanying Signs &  "Symptoms:  Fever/Chills: no  Gas/Bloating: YES  Nausea: YES  Vomitting: no  Diarrhea: no  Constipation: YES; small bowel movement yesterday and 4 days prior to that.  Dysuria or Hematuria: no  History:   Trauma: no  Previous similar pain: YES  Previous tests done: CT  Precipitating factors:   Does the pain change with:     Food: YES- worse    Bowel Movement: no    Urination: no   Other factors:  no  Therapies tried and outcome: None  No LMP recorded (lmp unknown). Patient has had a hysterectomy.  Bloated this time.  Worse after eating.   Low appetite. Some nausea.  No fever.  Tylenol helps pain    Patient states has increased her water intake to 16 oz of water daily      Review of Systems   Constitutional, HEENT, cardiovascular, pulmonary, GI, , musculoskeletal, neuro, skin, endocrine and psych systems are negative, except as otherwise noted.      Objective    /77 (BP Location: Left arm, Patient Position: Sitting, Cuff Size: Adult Regular)   Pulse 59   Temp 96.6  F (35.9  C) (Tympanic)   Ht 1.664 m (5' 5.5\")   Wt 67.1 kg (148 lb)   LMP  (LMP Unknown)   SpO2 96%   BMI 24.25 kg/m    There is no height or weight on file to calculate BMI.  Physical Exam   GENERAL: healthy, alert and no distress  NECK: no adenopathy, no asymmetry, masses, or scars and thyroid normal to palpation  RESP: lungs clear to auscultation - no rales, rhonchi or wheezes  CV: regular rate and rhythm, normal S1 S2, no S3 or S4, no murmur, click or rub, no peripheral edema and peripheral pulses strong  ABDOMEN: tenderness LLQ and umbilical, no organomegaly or masses and bowel sounds normal; abdomen soft, nondistended.   MS: no gross musculoskeletal defects noted, no edema    Results for orders placed or performed in visit on 06/14/21 (from the past 24 hour(s))   CBC with platelets and differential   Result Value Ref Range    WBC 3.4 (L) 4.0 - 11.0 10e9/L    RBC Count 4.42 3.8 - 5.2 10e12/L    Hemoglobin 13.5 11.7 - 15.7 g/dL    " Hematocrit 41.3 35.0 - 47.0 %    MCV 93 78 - 100 fl    MCH 30.5 26.5 - 33.0 pg    MCHC 32.7 31.5 - 36.5 g/dL    RDW 12.8 10.0 - 15.0 %    Platelet Count 253 150 - 450 10e9/L    % Neutrophils 51.6 %    % Lymphocytes 37.1 %    % Monocytes 9.5 %    % Eosinophils 1.2 %    % Basophils 0.6 %    Absolute Neutrophil 1.7 1.6 - 8.3 10e9/L    Absolute Lymphocytes 1.3 0.8 - 5.3 10e9/L    Absolute Monocytes 0.3 0.0 - 1.3 10e9/L    Absolute Eosinophils 0.0 0.0 - 0.7 10e9/L    Absolute Basophils 0.0 0.0 - 0.2 10e9/L    Diff Method Automated Method    *UA reflex to Microscopic and Culture ([place] and [facility] (except [place] and [place])    Specimen: Midstream Urine   Result Value Ref Range    Color Urine Yellow     Appearance Urine Slightly Cloudy     Glucose Urine Negative NEG^Negative mg/dL    Bilirubin Urine Negative NEG^Negative    Ketones Urine Negative NEG^Negative mg/dL    Specific Gravity Urine 1.015 1.003 - 1.035    Blood Urine Negative NEG^Negative    pH Urine 6.0 5.0 - 7.0 pH    Protein Albumin Urine Negative NEG^Negative mg/dL    Urobilinogen Urine 0.2 0.2 - 1.0 EU/dL    Nitrite Urine Negative NEG^Negative    Leukocyte Esterase Urine Large (A) NEG^Negative    Source Midstream Urine    Urine Microscopic   Result Value Ref Range    WBC Urine 10-25 (A) OTO5^0 - 5 /HPF    RBC Urine O - 2 OTO2^O - 2 /HPF    Squamous Epithelial /LPF Urine Moderate (A) FEW^Few /LPF    Bacteria Urine Moderate (A) NEG^Negative /HPF     Ct Abdomen Pelvis W Contrast    Result Date: 6/14/2021  CT ABDOMEN PELVIS W CONTRAST 6/14/2021 2:05 PM CLINICAL HISTORY: Abdominal abscess/infection suspected; Diverticulosis of large intestine without hemorrhage; Abdominal pain, left lower quadrant TECHNIQUE: CT scan of the abdomen and pelvis was performed following injection of IV contrast. Multiplanar reformats were obtained. Dose reduction techniques were used. CONTRAST: 91mL Isovue-370 COMPARISON: 12/26/2019 FINDINGS: LOWER CHEST: Benign  pneumatocele left lower lobe. HEPATOBILIARY: Normal. PANCREAS: Normal. SPLEEN: Multiple splenic granulomas. ADRENAL GLANDS: Normal. KIDNEYS/BLADDER: Normal. BOWEL: Descending and sigmoid colonic diverticulosis. There is focal inflammation of the proximal sigmoid colon and adjacent fat, consistent with diverticulitis. No abscess or obstruction. PELVIC ORGANS: Hysterectomy. ADDITIONAL FINDINGS: None. MUSCULOSKELETAL: Normal.     IMPRESSION: 1.  Mild acute uncomplicated sigmoid colonic diverticulitis. RADHA HARRIS MD

## 2021-06-14 NOTE — TELEPHONE ENCOUNTER
This writer attempted to contact patient on 06/14/21      Reason for call results and left message for patient to return [facility] [phone]      If patient calls back:   Relay message from provider below, (read verbatim), document that pt called and close encounter        Melinda Rodriguez APRN CNP   6/14/2021  2:26 PM CDT      Please call patient to discuss CT scan results:  Does show diverticulitis.  Needs to take metronidazole 500 mg three times a day for 10 days along with cefdinir twice a day for 10 days.  Follow up in one week to reassess symptoms.  MARCELINA Bradley CNP, RN

## 2021-06-15 LAB
TTG IGA SER-ACNC: <1 U/ML
TTG IGG SER-ACNC: <1 U/ML

## 2021-06-15 NOTE — TELEPHONE ENCOUNTER
Patient updated on the below, no questions at this time, verbalized understanding.  She picked up the prescription yesterday already.  Nevin Taylor RN

## 2021-06-16 LAB
BACTERIA SPEC CULT: NORMAL
Lab: NORMAL
SPECIMEN SOURCE: NORMAL

## 2021-06-21 PROBLEM — Z88.9 MULTIPLE DRUG ALLERGIES: Status: ACTIVE | Noted: 2021-06-21

## 2021-06-23 ENCOUNTER — TRANSFERRED RECORDS (OUTPATIENT)
Dept: HEALTH INFORMATION MANAGEMENT | Facility: CLINIC | Age: 66
End: 2021-06-23

## 2021-08-16 ENCOUNTER — TRANSFERRED RECORDS (OUTPATIENT)
Dept: HEALTH INFORMATION MANAGEMENT | Facility: CLINIC | Age: 66
End: 2021-08-16

## 2021-09-30 ENCOUNTER — OFFICE VISIT (OUTPATIENT)
Dept: FAMILY MEDICINE | Facility: CLINIC | Age: 66
End: 2021-09-30
Payer: COMMERCIAL

## 2021-09-30 VITALS
HEART RATE: 72 BPM | WEIGHT: 150 LBS | DIASTOLIC BLOOD PRESSURE: 80 MMHG | HEIGHT: 65 IN | RESPIRATION RATE: 16 BRPM | SYSTOLIC BLOOD PRESSURE: 130 MMHG | TEMPERATURE: 97.6 F | OXYGEN SATURATION: 97 % | BODY MASS INDEX: 24.99 KG/M2

## 2021-09-30 DIAGNOSIS — F33.0 MAJOR DEPRESSIVE DISORDER, RECURRENT EPISODE, MILD (H): ICD-10-CM

## 2021-09-30 DIAGNOSIS — Z00.00 ENCOUNTER FOR MEDICARE ANNUAL WELLNESS EXAM: Primary | ICD-10-CM

## 2021-09-30 DIAGNOSIS — I10 HTN, GOAL BELOW 140/90: ICD-10-CM

## 2021-09-30 DIAGNOSIS — R06.83 SNORING: ICD-10-CM

## 2021-09-30 DIAGNOSIS — E78.5 HYPERLIPIDEMIA LDL GOAL <160: ICD-10-CM

## 2021-09-30 LAB
ERYTHROCYTE [DISTWIDTH] IN BLOOD BY AUTOMATED COUNT: 13 % (ref 10–15)
HCT VFR BLD AUTO: 41.3 % (ref 35–47)
HGB BLD-MCNC: 13.7 G/DL (ref 11.7–15.7)
MCH RBC QN AUTO: 31.1 PG (ref 26.5–33)
MCHC RBC AUTO-ENTMCNC: 33.2 G/DL (ref 31.5–36.5)
MCV RBC AUTO: 94 FL (ref 78–100)
PLATELET # BLD AUTO: 217 10E3/UL (ref 150–450)
RBC # BLD AUTO: 4.41 10E6/UL (ref 3.8–5.2)
WBC # BLD AUTO: 3.8 10E3/UL (ref 4–11)

## 2021-09-30 PROCEDURE — G0438 PPPS, INITIAL VISIT: HCPCS | Performed by: PHYSICIAN ASSISTANT

## 2021-09-30 PROCEDURE — 80061 LIPID PANEL: CPT | Performed by: PHYSICIAN ASSISTANT

## 2021-09-30 PROCEDURE — 85027 COMPLETE CBC AUTOMATED: CPT | Performed by: PHYSICIAN ASSISTANT

## 2021-09-30 PROCEDURE — 0004A PR COVID VAC PFIZER DIL RECON 30 MCG/0.3 ML IM: CPT | Performed by: PHYSICIAN ASSISTANT

## 2021-09-30 PROCEDURE — 91300 PR COVID VAC PFIZER DIL RECON 30 MCG/0.3 ML IM: CPT | Performed by: PHYSICIAN ASSISTANT

## 2021-09-30 PROCEDURE — 36415 COLL VENOUS BLD VENIPUNCTURE: CPT | Performed by: PHYSICIAN ASSISTANT

## 2021-09-30 PROCEDURE — 80053 COMPREHEN METABOLIC PANEL: CPT | Performed by: PHYSICIAN ASSISTANT

## 2021-09-30 RX ORDER — CHOLECALCIFEROL (VITAMIN D3) 50 MCG
1 TABLET ORAL DAILY
COMMUNITY
Start: 2021-09-30

## 2021-09-30 RX ORDER — LANOLIN ALCOHOL/MO/W.PET/CERES
CREAM (GRAM) TOPICAL DAILY
COMMUNITY
Start: 2021-09-30

## 2021-09-30 RX ORDER — EVE PRIMROSE/LINOLEIC/G-LENIC 1000 MG
CAPSULE ORAL
COMMUNITY
Start: 2021-09-30

## 2021-09-30 RX ORDER — SIMVASTATIN 40 MG
40 TABLET ORAL AT BEDTIME
Qty: 90 TABLET | Refills: 3 | Status: SHIPPED | OUTPATIENT
Start: 2021-09-30 | End: 2022-11-15

## 2021-09-30 RX ORDER — METAPROTERENOL SULFATE 10 MG
500 TABLET ORAL DAILY
COMMUNITY
Start: 2021-09-30

## 2021-09-30 RX ORDER — LISINOPRIL 30 MG/1
30 TABLET ORAL DAILY
Qty: 90 TABLET | Refills: 3 | Status: SHIPPED | OUTPATIENT
Start: 2021-09-30 | End: 2022-09-21

## 2021-09-30 RX ORDER — BUPROPION HYDROCHLORIDE 300 MG/1
300 TABLET ORAL EVERY MORNING
Qty: 90 TABLET | Refills: 3 | Status: SHIPPED | OUTPATIENT
Start: 2021-09-30 | End: 2022-09-21

## 2021-09-30 ASSESSMENT — ENCOUNTER SYMPTOMS
CHILLS: 0
PARESTHESIAS: 0
SHORTNESS OF BREATH: 0
CONSTIPATION: 1
ARTHRALGIAS: 1
BREAST MASS: 0
HEMATOCHEZIA: 0
JOINT SWELLING: 0
HEADACHES: 0
DIARRHEA: 1
HEMATURIA: 0
COUGH: 0
HEARTBURN: 0
DYSURIA: 0
DIZZINESS: 0
MYALGIAS: 0
FREQUENCY: 0
ABDOMINAL PAIN: 0
FEVER: 0
WEAKNESS: 0
SORE THROAT: 0
NAUSEA: 0
NERVOUS/ANXIOUS: 0
EYE PAIN: 0
PALPITATIONS: 0

## 2021-09-30 ASSESSMENT — ACTIVITIES OF DAILY LIVING (ADL): CURRENT_FUNCTION: NO ASSISTANCE NEEDED

## 2021-09-30 ASSESSMENT — MIFFLIN-ST. JEOR: SCORE: 1220.24

## 2021-09-30 NOTE — PROGRESS NOTES
"SUBJECTIVE:   Breonna Caruso is a 66 year old female who presents for Preventive Visit.      Patient has been advised of split billing requirements and indicates understanding: Yes   Are you in the first 12 months of your Medicare coverage?  No    Healthy Habits:     In general, how would you rate your overall health?  Good    Frequency of exercise:  None    Duration of exercise:  Less than 15 minutes    Do you usually eat at least 4 servings of fruit and vegetables a day, include whole grains    & fiber and avoid regularly eating high fat or \"junk\" foods?  No    Taking medications regularly:  No    Barriers to taking medications:  None    Medication side effects:  None    Ability to successfully perform activities of daily living:  No assistance needed    Home Safety:  No safety concerns identified    Hearing Impairment:  No hearing concerns    In the past 6 months, have you been bothered by leaking of urine? Yes    In general, how would you rate your overall mental or emotional health?  Good      PHQ-2 Total Score: 0    Additional concerns today:  No    Do you feel safe in your environment? Yes    Have you ever done Advance Care Planning? (For example, a Health Directive, POLST, or a discussion with a medical provider or your loved ones about your wishes): Yes, advance care planning is on file.       Fall risk  Fallen 2 or more times in the past year?: No  Any fall with injury in the past year?: No    Cognitive Screening   1) Repeat 3 items (Leader, Season, Table)    2) Clock draw: NORMAL  3) 3 item recall: Recalls 3 objects  Results: 3 items recalled: COGNITIVE IMPAIRMENT LESS LIKELY    Mini-CogTM Copyright GERARDO Rosario. Licensed by the author for use in [facility]; reprinted with permission (edward@.[facility]). All rights reserved.      Do you have sleep apnea, excessive snoring or daytime drowsiness?: yes    Reviewed and updated as needed this visit by clinical staff                 Reviewed and updated " as needed this visit by Provider                Social History     Tobacco Use     Smoking status: Never Smoker     Smokeless tobacco: Never Used   Substance Use Topics     Alcohol use: Yes     Comment: some wine/beer         Alcohol Use 8/6/2019   Prescreen: >3 drinks/day or >7 drinks/week? No   Prescreen: >3 drinks/day or >7 drinks/week? -               Current providers sharing in care for this patient include:   Patient Care Team:  Melinda Rodriguez APRN CNP as PCP - General (Nurse Practitioner - Family)  Melinda Rodriguez APRN CNP as Assigned PCP    The following health maintenance items are reviewed in Epic and correct as of today:  Health Maintenance Due   Topic Date Due     HIT-6  Never done     COVID-19 Vaccine (3 - Pfizer risk 3-dose series) 03/20/2021     INFLUENZA VACCINE (1) 09/01/2021     ZOSTER IMMUNIZATION (2 of 2) 12/07/2020     MEDICARE ANNUAL WELLNESS VISIT  10/12/2021     ASTHMA ACTION PLAN  10/12/2021     FALL RISK ASSESSMENT  10/12/2021     PHQ-9  10/13/2021     BP Readings from Last 3 Encounters:   09/30/21 130/80   06/14/21 138/77   06/02/21 130/79    Wt Readings from Last 3 Encounters:   09/30/21 68 kg (150 lb)   06/14/21 67.1 kg (148 lb)   06/02/21 67.1 kg (148 lb)                  Patient Active Problem List   Diagnosis     GERD (gastroesophageal reflux disease)     Mild persistent asthma     Hyperlipidemia LDL goal <160     Migraine headache     Advanced directives, counseling/discussion     Family history of coronary artery disease     Decreased libido     HTN, goal below 140/90     Major depressive disorder, recurrent episode, mild (HCC)     Recurrent UTI     Vertigo     Seasonal allergic rhinitis due to pollen     Multiple drug allergies     Past Surgical History:   Procedure Laterality Date     COLONOSCOPY N/A 7/10/2015    Procedure: COLONOSCOPY;  Surgeon: Saul Aguilar MD;  Location: [facility]     HYSTERECTOMY VAGINAL  1994    endometriosis     REPAIR PTOSIS  BILATERAL  1/4/2013    Procedure: REPAIR PTOSIS BILATERAL;  BILATERAL UPPER LID MECHANICAL PTOSIS REPAIR;  Surgeon: Derek Allred MD;  Location:  [facility]     SLING BLADDER SUSPENSION WITH FASCIA KAYLYN  2004    bladder sling      SURGICAL HISTORY OF -   2003    rt shoulder surgery        Social History     Tobacco Use     Smoking status: Never Smoker     Smokeless tobacco: Never Used   Substance Use Topics     Alcohol use: Yes     Comment: some wine/beer     Family History   Problem Relation Age of Onset     Lipids Mother      Heart Disease Mother         pacemaker     Heart Disease Father         heart attacks-multiple ones     Lipids Father         diabetes after stroke      Cerebrovascular Disease Father      Heart Disease Brother         3 total, one wtih open heart surgery at age of 55     Diabetes Brother         one brother with dm, other one with glucose intolerance     Breast Cancer No family hx of      Cancer - colorectal No family hx of      Colon Cancer No family hx of          Recent Labs   Lab Test 06/14/21  1102 10/12/20  1057 07/28/20  1341 12/26/19  1259 12/26/19  1259 08/07/19  0821 08/07/19  0821 01/01/19  1227 04/24/18  0908 06/01/17  1458 05/18/17  0906   LDL  --  116*  --   --   --   --  101*  --  89  --  104*   HDL  --  71  --   --   --   --  39*  --  38*  --  46*   TRIG  --  117  --   --   --   --  268*  --  84  --  98   ALT 27  --  26  --  27   < > 61*   < > 29   < > 71*   CR 0.71  --  0.66   < > 0.65   < > 0.72   < > 0.63   < > 0.71   GFRESTIMATED 89  --  >90   < > >90   < > 88   < > >90   < > 83   GFRESTBLACK >90  --  >90   < > >90   < > >90   < > >90   < > >90  African American GFR Calc     POTASSIUM 4.1  --  3.9   < > 4.1   < > 4.2   < > 4.2   < > 4.1   TSH  --   --   --   --   --   --   --   --  1.14  --  1.47    < > = values in this interval not displayed.        Any new diagnosis of family breast, ovarian, or bowel cancer? No    FHS-7: No flowsheet data found.  click delete button  "to remove this line now  Mammogram Screening: Recommended mammography every 1-2 years with patient discussion and risk factor consideration  Pertinent mammograms are reviewed under the imaging tab.    Review of Systems   Constitutional: Negative for chills and fever.   HENT: Negative for congestion, ear pain, hearing loss and sore throat.    Eyes: Negative for pain and visual disturbance.   Respiratory: Negative for cough and shortness of breath.    Cardiovascular: Negative for chest pain, palpitations and peripheral edema.   Gastrointestinal: Positive for constipation and diarrhea. Negative for abdominal pain, heartburn, hematochezia and nausea.   Breasts:  Negative for tenderness, breast mass and discharge.   Genitourinary: Negative for dysuria, frequency, genital sores, hematuria, pelvic pain, urgency, vaginal bleeding and vaginal discharge.   Musculoskeletal: Positive for arthralgias. Negative for joint swelling and myalgias.   Skin: Negative for rash.   Neurological: Negative for dizziness, weakness, headaches and paresthesias.   Psychiatric/Behavioral: Negative for mood changes. The patient is not nervous/anxious.          OBJECTIVE:   LMP  (LMP Unknown)  Estimated body mass index is 24.25 kg/m  as calculated from the following:    Height as of 6/14/21: 1.664 m (5' 5.5\").    Weight as of 6/14/21: 67.1 kg (148 lb).  Physical Exam  GENERAL APPEARANCE: healthy, alert and no distress  EYES: Eyes grossly normal to inspection, PERRL and conjunctivae and sclerae normal  HENT: ear canals and TM's normal, nose and mouth without ulcers or lesions, oropharynx clear and oral mucous membranes moist  NECK: no adenopathy, no asymmetry, masses, or scars and thyroid normal to palpation  RESP: lungs clear to auscultation - no rales, rhonchi or wheezes  BREAST: normal without masses, tenderness or nipple discharge and no palpable axillary masses or adenopathy  CV: regular rate and rhythm, normal S1 S2, no S3 or S4, no murmur, " "click or rub, no peripheral edema and peripheral pulses strong  ABDOMEN: soft, nontender, no hepatosplenomegaly, no masses and bowel sounds normal  MS: no musculoskeletal defects are noted and gait is age appropriate without ataxia  SKIN: no suspicious lesions or rashes  NEURO: Normal strength and tone, sensory exam grossly normal, mentation intact and speech normal  PSYCH: mentation appears normal and affect normal/bright    Diagnostic Test Results:  Labs reviewed in Epic    ASSESSMENT / PLAN:   (Z00.00) Encounter for Medicare annual wellness exam  (primary encounter diagnosis)  Comment:   Plan:     (R06.83) Snoring  Comment:   Plan: SLEEP EVALUATION & MANAGEMENT REFERRAL - ADULT         -            (F33.0) Major depressive disorder, recurrent episode, mild (HCC)  Comment:   Plan: buPROPion (WELLBUTRIN XL) 300 MG 24 hr tablet            (I10) HTN, goal below 140/90  Comment:   Plan: lisinopril (ZESTRIL) 30 MG tablet, Lipid         Profile, Comprehensive metabolic panel, CBC         with platelets            (E78.5) Hyperlipidemia LDL goal <160  Comment:   Plan: simvastatin (ZOCOR) 40 MG tablet, Lipid Profile              Patient has been advised of split billing requirements and indicates understanding: Yes  COUNSELING:  Reviewed preventive health counseling, as reflected in patient instructions       Regular exercise       Healthy diet/nutrition       Vision screening       Hearing screening       Aspirin prophylaxis     Estimated body mass index is 24.25 kg/m  as calculated from the following:    Height as of 6/14/21: 1.664 m (5' 5.5\").    Weight as of 6/14/21: 67.1 kg (148 lb).        She reports that she has never smoked. She has never used smokeless tobacco.      Appropriate preventive services were discussed with this patient, including applicable screening as appropriate for cardiovascular disease, diabetes, osteopenia/osteoporosis, and glaucoma.  As appropriate for age/gender, discussed screening for " colorectal cancer, prostate cancer, breast cancer, and cervical cancer. Checklist reviewing preventive services available has been given to the patient.    Reviewed patients plan of care and provided an AVS. The Basic Care Plan (routine screening as documented in Health Maintenance) for Breonna meets the Care Plan requirement. This Care Plan has been established and reviewed with the Patient.    Counseling Resources:  ATP IV Guidelines  Pooled Cohorts Equation Calculator  Breast Cancer Risk Calculator  Breast Cancer: Medication to Reduce Risk  FRAX Risk Assessment  ICSI Preventive Guidelines  Dietary Guidelines for Americans, 2010  USDA's MyPlate  ASA Prophylaxis  Lung CA Screening    Ramona Ann Aaseby-Aguilera, PA-C [facility]    Identified Health Risks:

## 2021-09-30 NOTE — PATIENT INSTRUCTIONS
Patient Education   Personalized Prevention Plan  You are due for the preventive services outlined below.  Your care team is available to assist you in scheduling these services.  If you have already completed any of these items, please share that information with your care team to update in your medical record.  Health Maintenance Due   Topic Date Due     Headache Impact Test Assessment  Never done     COVID-19 Vaccine (3 - Pfizer risk 3-dose series) 03/20/2021     Flu Vaccine (1) 09/01/2021     Zoster (Shingles) Vaccine (2 of 2) 12/07/2020     Annual Wellness Visit  10/12/2021     Asthma Action Plan - yearly  10/12/2021     FALL RISK ASSESSMENT  10/12/2021     Depression Assessment  10/13/2021

## 2021-10-01 LAB
ALBUMIN SERPL-MCNC: 3.8 G/DL (ref 3.4–5)
ALP SERPL-CCNC: 67 U/L (ref 40–150)
ALT SERPL W P-5'-P-CCNC: 33 U/L (ref 0–50)
ANION GAP SERPL CALCULATED.3IONS-SCNC: 4 MMOL/L (ref 3–14)
AST SERPL W P-5'-P-CCNC: 18 U/L (ref 0–45)
BILIRUB SERPL-MCNC: 0.3 MG/DL (ref 0.2–1.3)
BUN SERPL-MCNC: 14 MG/DL (ref 7–30)
CALCIUM SERPL-MCNC: 9.6 MG/DL (ref 8.5–10.1)
CHLORIDE BLD-SCNC: 108 MMOL/L (ref 94–109)
CHOLEST SERPL-MCNC: 199 MG/DL
CO2 SERPL-SCNC: 29 MMOL/L (ref 20–32)
CREAT SERPL-MCNC: 0.64 MG/DL (ref 0.52–1.04)
FASTING STATUS PATIENT QL REPORTED: NO
GFR SERPL CREATININE-BSD FRML MDRD: >90 ML/MIN/1.73M2
GLUCOSE BLD-MCNC: 77 MG/DL (ref 70–99)
HDLC SERPL-MCNC: 54 MG/DL
LDLC SERPL CALC-MCNC: 118 MG/DL
NONHDLC SERPL-MCNC: 145 MG/DL
POTASSIUM BLD-SCNC: 3.6 MMOL/L (ref 3.4–5.3)
PROT SERPL-MCNC: 6.9 G/DL (ref 6.8–8.8)
SODIUM SERPL-SCNC: 141 MMOL/L (ref 133–144)
TRIGL SERPL-MCNC: 136 MG/DL

## 2021-10-02 ENCOUNTER — HEALTH MAINTENANCE LETTER (OUTPATIENT)
Age: 66
End: 2021-10-02

## 2021-10-18 ENCOUNTER — ANCILLARY PROCEDURE (OUTPATIENT)
Dept: MAMMOGRAPHY | Facility: CLINIC | Age: 66
End: 2021-10-18
Attending: PHYSICIAN ASSISTANT
Payer: COMMERCIAL

## 2021-10-18 DIAGNOSIS — Z12.31 SCREENING MAMMOGRAM, ENCOUNTER FOR: ICD-10-CM

## 2021-10-18 PROCEDURE — 77063 BREAST TOMOSYNTHESIS BI: CPT | Performed by: RADIOLOGY

## 2021-10-18 PROCEDURE — 77067 SCR MAMMO BI INCL CAD: CPT | Performed by: RADIOLOGY

## 2021-12-22 ENCOUNTER — DOCUMENTATION ONLY (OUTPATIENT)
Dept: OTHER | Facility: CLINIC | Age: 66
End: 2021-12-22
Payer: COMMERCIAL

## 2021-12-28 ENCOUNTER — NURSE TRIAGE (OUTPATIENT)
Dept: NURSING | Facility: CLINIC | Age: 66
End: 2021-12-28
Payer: COMMERCIAL

## 2021-12-28 NOTE — PROGRESS NOTES
Breonna is a 66 year old who is being evaluated via a billable video visit.      How would you like to obtain your AVS? MyChart  If the video visit is dropped, the invitation should be resent by: Text to cell phone: [phone]  Will anyone else be joining your video visit? No      Assessment & Plan       ICD-10-CM    1. Cough  R05.9 Symptomatic; Yes; 12/24/2021 COVID-19 Virus (Coronavirus) by PCR Nose     Influenza A & B Antigen     beclomethasone HFA (QVAR REDIHALER) 80 MCG/ACT inhaler     albuterol (PROAIR HFA/PROVENTIL HFA/VENTOLIN HFA) 108 (90 Base) MCG/ACT inhaler   2. Shortness of breath  R06.02 Symptomatic; Yes; 12/24/2021 COVID-19 Virus (Coronavirus) by PCR Nose     Influenza A & B Antigen     beclomethasone HFA (QVAR REDIHALER) 80 MCG/ACT inhaler     albuterol (PROAIR HFA/PROVENTIL HFA/VENTOLIN HFA) 108 (90 Base) MCG/ACT inhaler   3. Exposure to COVID-19 virus  Z20.822 Symptomatic; Yes; 12/24/2021 COVID-19 Virus (Coronavirus) by PCR Nose   4. Mild persistent asthma with acute exacerbation  J45.31 beclomethasone HFA (QVAR REDIHALER) 80 MCG/ACT inhaler     albuterol (PROAIR HFA/PROVENTIL HFA/VENTOLIN HFA) 108 (90 Base) MCG/ACT inhaler      65yo with cough, SOB, congestion.  Exposure to co-worker with COVID 2 days ago, but sx's started prior, after exposure to 1yo grandson with cough/URI sx's last week.  Pt also with h/o asthma.  Rec testing for COVID and flu- testing ordered.    With her h/o bronchitis x 4-6 wks for most Jan/Feb months, discussed suspicion for asthma related sx's.  Will send in albuterol and QVAR, and encouraged to use them unless they do not seem helpful.  If helpful, use the QVAR until sx's have completely resolved, and albuterol prn if/when helpful.  Risks and benefits of medication(s) including potential side effects reviewed with patient.  Questions answered.      RTC if symptoms persist or worsen.      Kera Brooks MD  [facility]        Subjective    Breonna is a 66 year old who presents for the following health issues - cough, SOB    Cough  This is a recurrent problem. The current episode started in the past 7 days. The problem occurs every few minutes. The problem has been rapidly worsening. The cough is productive of sputum. Associated symptoms include chills, headaches, rhinorrhea, sore throat and shortness of breath. Pertinent negatives include no chest pain, no sweats, no weight loss, no ear congestion, no ear pain, no myalgias and no wheezing.          Concern for COVID-19  Patient states that Co-worker tested positive yesterday   About how many days ago did these symptoms start? 7 days (around her on Mon x days, had been traveling)  Is this your first visit for this illness? Yes  In the 14 days before your symptoms started, have you had close contact with someone with COVID-19 (Coronavirus)? Yes, I have been in contact with someone who has COVID-19/Coronavirus (confirmed by lab test).  Do you have a fever or chills? No  Are you having new or worsening difficulty breathing? Yes   Please describe what kind of difficulty you are having breathing:Mild dyspnea (able to do ADLs without difficulty, mild shortness of breath with activities such as climbing one or two flights of stairs or walking briskly)  Do you have new or worsening cough? Yes, I am coughing up mucus.  Have you had any new or unexplained body aches? A little bit     Have you experienced any of the following NEW symptoms?    Headache: YES    Sore throat: YES    Loss of taste or smell: No    Chest pain: No    Diarrhea: YES, a little bit but states she also has diverticulitis     Rash: No  What treatments have you tried? Robitussin, aspirin, and old prescription of cough pills   Who do you live with?  not having symptoms   Are you, or a household member, a healthcare worker or a ? yes,  does recovery and lukas[facility]   Do you live in a nursing home, group home, or shelter?  No  Do you have a way to get food/medications if quarantined? Yes, I have a friend or family member who can help me.    Main sx's- coughing, ST, bit of drainage and post-nasal drip.  Has had some chills, no fever.  Abd/ribs hurt from cough.    Hasn't done any testing for COVID and flu.  Has had booster for covid and flu shot.    Grandson is 2 yrs, had a barky cough while traveling to see their family this past week.      Tends to get bronchitis in /Feb.  Usually 4-6 wks.  Abx tend to help.  Has history of very mild asthma.  Rare use of albuterol.  Also has QVAR.  Both are .          Review of Systems   Constitutional: Positive for chills. Negative for fever and weight loss.   HENT: Positive for postnasal drip, rhinorrhea and sore throat. Negative for ear pain.    Respiratory: Positive for cough and shortness of breath. Negative for wheezing.    Cardiovascular: Negative for chest pain.   Gastrointestinal: Negative for heartburn.   Musculoskeletal: Negative for myalgias.   Skin: Negative for rash.   Neurological: Positive for headaches.            Objective     Vitals:  No vitals were obtained today due to virtual visit.    Physical Exam   GENERAL: Healthy, alert and no distress  EYES: Eyes grossly normal to inspection.  No discharge or erythema, or obvious scleral/conjunctival abnormalities.  RESP: No audible wheeze, cough, or visible cyanosis.  No visible retractions or increased work of breathing.    SKIN: Visible skin clear. No significant rash, abnormal pigmentation or lesions.  NEURO: Cranial nerves grossly intact.  Mentation and speech appropriate for age.  PSYCH: Mentation appears normal, affect normal/bright, judgement and insight intact, normal speech and appearance well-groomed.    Office Visit on 2021   Component Date Value Ref Range Status     Cholesterol 2021 199  <200 mg/dL Final     Triglycerides 2021 136  <150 mg/dL Final     Direct Measure HDL 2021 54  >=50 mg/dL  Final     LDL Cholesterol Calculated 09/30/2021 118* <=100 mg/dL Final     Non HDL Cholesterol 09/30/2021 145* <130 mg/dL Final     Patient Fasting > 8hrs? 09/30/2021 No   Final     Sodium 09/30/2021 141  133 - 144 mmol/L Final     Potassium 09/30/2021 3.6  3.4 - 5.3 mmol/L Final     Chloride 09/30/2021 108  94 - 109 mmol/L Final     Carbon Dioxide (CO2) 09/30/2021 29  20 - 32 mmol/L Final     Anion Gap 09/30/2021 4  3 - 14 mmol/L Final     Urea Nitrogen 09/30/2021 14  7 - 30 mg/dL Final     Creatinine 09/30/2021 0.64  0.52 - 1.04 mg/dL Final     Calcium 09/30/2021 9.6  8.5 - 10.1 mg/dL Final     Glucose 09/30/2021 77  70 - 99 mg/dL Final     Alkaline Phosphatase 09/30/2021 67  40 - 150 U/L Final     AST 09/30/2021 18  0 - 45 U/L Final     ALT 09/30/2021 33  0 - 50 U/L Final     Protein Total 09/30/2021 6.9  6.8 - 8.8 g/dL Final     Albumin 09/30/2021 3.8  3.4 - 5.0 g/dL Final     Bilirubin Total 09/30/2021 0.3  0.2 - 1.3 mg/dL Final     GFR Estimate 09/30/2021 >90  >60 mL/min/1.73m2 Final    As of July 11, 2021, eGFR is calculated by the CKD-EPI creatinine equation, without race adjustment. eGFR can be influenced by muscle mass, exercise, and diet. The reported eGFR is an estimation only and is only applicable if the renal function is stable.     WBC Count 09/30/2021 3.8* 4.0 - 11.0 10e3/uL Final     RBC Count 09/30/2021 4.41  3.80 - 5.20 10e6/uL Final     Hemoglobin 09/30/2021 13.7  11.7 - 15.7 g/dL Final     Hematocrit 09/30/2021 41.3  35.0 - 47.0 % Final     MCV 09/30/2021 94  78 - 100 fL Final     MCH 09/30/2021 31.1  26.5 - 33.0 pg Final     MCHC 09/30/2021 33.2  31.5 - 36.5 g/dL Final     RDW 09/30/2021 13.0  10.0 - 15.0 % Final     Platelet Count 09/30/2021 217  150 - 450 10e3/uL Final     Results for orders placed or performed in visit on 12/29/21   Symptomatic; Yes; 12/24/2021 COVID-19 Virus (Coronavirus) by PCR Nose     Status: Normal    Specimen: Nose; Swab   Result Value Ref Range    SARS CoV2 PCR  Negative Negative    Narrative    Testing was performed using the Aptima SARS-CoV-2 Assay on the  [employer] Instrument System. Additional information about this  Emergency Use Authorization (EUA) assay can be found via the Lab  Guide. This test should be ordered for the detection of SARS-CoV-2 in  individuals who meet SARS-CoV-2 clinical and/or epidemiological  criteria. Test performance is unknown in asymptomatic patients. This  test is for in vitro diagnostic use under the FDA EUA for  laboratories certified under CLIA to perform high complexity testing.  This test has not been FDA cleared or approved. A negative result  does not rule out the presence of PCR inhibitors in the specimen or  target RNA in concentration below the limit of detection for the  assay. The possibility of a false negative should be considered if  the patient's recent exposure or clinical presentation suggests  COVID-19. This test was validated by the [facility]. This laboratory is certified under  the Clinical Laboratory Improvement Amendments of 1988 (CLIA-88) as  qualified to perform high complexity laboratory testing.             Video-Visit Details    Type of service:  Video Visit    Video End Time:9:30 AM (video start 9:14 AM)    Originating Location (pt. Location): Home    Distant Location (provider location):  [facility]     Platform used for Video Visit: [employer]  Answers for HPI/ROS submitted by the patient on 12/29/2021  hemoptysis: No  nasal congestion: Yes  Aggravated by: nothing

## 2021-12-28 NOTE — TELEPHONE ENCOUNTER
"Triage Call:     Saturday patient had a runny nose, then Sunday an \"annoying cough\"  Quickly it went into nighttime coughing and sore throat  Productive cough with green phlegm, nasal drainage, sore throat   Vaccinated for COVID-19 and had booster  No fever    Recent illness exposure:   Grandson in [place] had a runny nose and today she found out he has a fever  Pt was in [place] 12/21-12/26/2021    Disposition: Telemedicine. Pt was given care advice for symptoms and transferred to [facility] to make a virtual appt.     Rosalind Pelayo RN  [facility] Nurse Advisor 9:49 AM 12/28/2021    Reason for Disposition    HIGH RISK for severe COVID complications (e.g., age > 64 years, obesity with BMI > 25, pregnant, chronic lung disease or other chronic medical condition)  (Exception: Already seen by PCP and no new or worsening symptoms.)    Additional Information    Negative: SEVERE difficulty breathing (e.g., struggling for each breath, speaks in single words)    Negative: Difficult to awaken or acting confused (e.g., disoriented, slurred speech)    Negative: Bluish (or gray) lips or face now    Negative: Shock suspected (e.g., cold/pale/clammy skin, too weak to stand, low BP, rapid pulse)    Negative: Sounds like a life-threatening emergency to the triager    Negative: SEVERE or constant chest pain or pressure (Exception: mild central chest pain, present only when coughing)    Negative: MODERATE difficulty breathing (e.g., speaks in phrases, SOB even at rest, pulse 100-120)    Negative: [1] Headache AND [2] stiff neck (can't touch chin to chest)    Negative: MILD difficulty breathing (e.g., minimal/no SOB at rest, SOB with walking, pulse <100)    Negative: Chest pain or pressure    Negative: Patient sounds very sick or weak to the triager    Negative: Fever > 103 F (39.4 C)    Negative: [1] Fever > 101 F (38.3 C) AND [2] age > 60 years    Negative: [1] Fever > 100.0 F (37.8 C) AND [2] bedridden (e.g., nursing home " patient, CVA, chronic illness, recovering from surgery)    Protocols used: CORONAVIRUS (COVID-19) DIAGNOSED OR [id number] 8.25.2021    COVID 19 Nurse Triage Plan/Patient Instructions    Please be aware that novel coronavirus (COVID-19) may be circulating in the community. If you develop symptoms such as fever, cough, or SOB or if you have concerns about the presence of another infection including coronavirus (COVID-19), please contact your health care provider or visit [email or web address].     Disposition/Instructions    Virtual Visit with provider recommended. Reference Visit Selection Guide.    Thank you for taking steps to prevent the spread of this virus.  o Limit your contact with others.  o Wear a simple mask to cover your cough.  o Wash your hands well and often.    Resources    [facility]: About COVID-19: [email or web address]    CDC: What to Do If You're Sick: www.cdc.gov/coronavirus/2019-ncov/about/steps-when-sick.html    CDC: Ending Home Isolation: www.cdc.gov/coronavirus/2019-ncov/hcp/disposition-in-home-patients.html     CDC: Caring for Someone: www.cdc.gov/coronavirus/2019-ncov/if-you-are-sick/care-for-someone.html     [facility]: Interim Guidance for Hospital Discharge to Home: www.health.[facility].mn.us/diseases/coronavirus/hcp/hospdischarge.pdf    [facility] clinical trials (COVID-19 research studies): clinicalaffairs.[facility]-clinical-trials     Below are the COVID-19 hotlines at the [facility] of Health ([facility]). Interpreters are available.   o For health questions: Call 231-284-8495 or 1-691.335.5612 (7 a.m. to 7 p.m.)  o For questions about schools and childcare: Call 556-921-6844 or [phone] (7 a.m. to 7 p.m.)

## 2021-12-29 ENCOUNTER — TELEPHONE (OUTPATIENT)
Dept: FAMILY MEDICINE | Facility: CLINIC | Age: 66
End: 2021-12-29

## 2021-12-29 ENCOUNTER — VIRTUAL VISIT (OUTPATIENT)
Dept: FAMILY MEDICINE | Facility: CLINIC | Age: 66
End: 2021-12-29
Payer: COMMERCIAL

## 2021-12-29 ENCOUNTER — LAB (OUTPATIENT)
Dept: URGENT CARE | Facility: URGENT CARE | Age: 66
End: 2021-12-29
Attending: FAMILY MEDICINE
Payer: COMMERCIAL

## 2021-12-29 DIAGNOSIS — R06.02 SHORTNESS OF BREATH: ICD-10-CM

## 2021-12-29 DIAGNOSIS — Z20.822 EXPOSURE TO COVID-19 VIRUS: ICD-10-CM

## 2021-12-29 DIAGNOSIS — J45.31 MILD PERSISTENT ASTHMA WITH ACUTE EXACERBATION: ICD-10-CM

## 2021-12-29 DIAGNOSIS — R05.9 COUGH: Primary | ICD-10-CM

## 2021-12-29 DIAGNOSIS — R05.9 COUGH: ICD-10-CM

## 2021-12-29 LAB — SARS-COV-2 RNA RESP QL NAA+PROBE: NEGATIVE

## 2021-12-29 PROCEDURE — 99213 OFFICE O/P EST LOW 20 MIN: CPT | Mod: 95 | Performed by: FAMILY MEDICINE

## 2021-12-29 PROCEDURE — U0003 INFECTIOUS AGENT DETECTION BY NUCLEIC ACID (DNA OR RNA); SEVERE ACUTE RESPIRATORY SYNDROME CORONAVIRUS 2 (SARS-COV-2) (CORONAVIRUS DISEASE [COVID-19]), AMPLIFIED PROBE TECHNIQUE, MAKING USE OF HIGH THROUGHPUT TECHNOLOGIES AS DESCRIBED BY CMS-2020-01-R: HCPCS

## 2021-12-29 PROCEDURE — U0005 INFEC AGEN DETEC AMPLI PROBE: HCPCS

## 2021-12-29 RX ORDER — ALBUTEROL SULFATE 90 UG/1
2 AEROSOL, METERED RESPIRATORY (INHALATION) EVERY 6 HOURS
Qty: 18 G | Refills: 1 | Status: SHIPPED | OUTPATIENT
Start: 2021-12-29 | End: 2022-02-07

## 2021-12-29 ASSESSMENT — ASTHMA QUESTIONNAIRES
QUESTION_3 LAST FOUR WEEKS HOW OFTEN DID YOUR ASTHMA SYMPTOMS (WHEEZING, COUGHING, SHORTNESS OF BREATH, CHEST TIGHTNESS OR PAIN) WAKE YOU UP AT NIGHT OR EARLIER THAN USUAL IN THE MORNING: NOT AT ALL
QUESTION_5 LAST FOUR WEEKS HOW WOULD YOU RATE YOUR ASTHMA CONTROL: COMPLETELY CONTROLLED
ACT_TOTALSCORE: 25
QUESTION_1 LAST FOUR WEEKS HOW MUCH OF THE TIME DID YOUR ASTHMA KEEP YOU FROM GETTING AS MUCH DONE AT WORK, SCHOOL OR AT HOME: NONE OF THE TIME
QUESTION_4 LAST FOUR WEEKS HOW OFTEN HAVE YOU USED YOUR RESCUE INHALER OR NEBULIZER MEDICATION (SUCH AS ALBUTEROL): NOT AT ALL
QUESTION_2 LAST FOUR WEEKS HOW OFTEN HAVE YOU HAD SHORTNESS OF BREATH: NOT AT ALL

## 2021-12-29 ASSESSMENT — ENCOUNTER SYMPTOMS
MYALGIAS: 0
FEVER: 0
HEARTBURN: 0
HEMOPTYSIS: 0
WHEEZING: 0
CHILLS: 1
SWEATS: 0
RHINORRHEA: 1
COUGH: 1
SORE THROAT: 1
HEADACHES: 1
SHORTNESS OF BREATH: 1
WEIGHT LOSS: 0

## 2021-12-30 ENCOUNTER — E-VISIT (OUTPATIENT)
Dept: FAMILY MEDICINE | Facility: CLINIC | Age: 66
End: 2021-12-30
Payer: COMMERCIAL

## 2021-12-30 DIAGNOSIS — R50.9 FEVER, UNSPECIFIED FEVER CAUSE: ICD-10-CM

## 2021-12-30 DIAGNOSIS — J40 BRONCHITIS: Primary | ICD-10-CM

## 2021-12-30 DIAGNOSIS — R05.9 COUGH: ICD-10-CM

## 2021-12-30 PROCEDURE — 99421 OL DIG E/M SVC 5-10 MIN: CPT | Performed by: FAMILY MEDICINE

## 2021-12-30 ASSESSMENT — ASTHMA QUESTIONNAIRES: ACT_TOTALSCORE: 25

## 2021-12-31 RX ORDER — BENZONATATE 100 MG/1
100 CAPSULE ORAL 3 TIMES DAILY PRN
Qty: 30 CAPSULE | Refills: 0 | Status: SHIPPED | OUTPATIENT
Start: 2021-12-31 | End: 2022-11-15

## 2021-12-31 RX ORDER — AZITHROMYCIN 250 MG/1
TABLET, FILM COATED ORAL
Qty: 6 TABLET | Refills: 0 | Status: SHIPPED | OUTPATIENT
Start: 2021-12-31 | End: 2022-01-05

## 2021-12-31 NOTE — TELEPHONE ENCOUNTER
See virtual visit notes from 12/29-   Inhalers don't seem to be helping- using them, but she's coughing up a lot of green phlegm.  Coughing all the time.  Temp up to 100.3F.  Most of the issue is her chest/ribs/abd from coughing.  SOB- bit when she's sitting there, especially if coughing or getting ready to cough or after coughing.  No stairs.  Fine walking around room to room, but not walking much.    COVID test was negative from two days ago.    Will send in rx for a z-pack and tessalon capsules.  If SOB worsening, should go in to be seen at  or ER- ER if concerning SOB.    Provider E-Visit time total (minutes): 11

## 2022-05-26 ENCOUNTER — TRANSFERRED RECORDS (OUTPATIENT)
Dept: HEALTH INFORMATION MANAGEMENT | Facility: CLINIC | Age: 67
End: 2022-05-26
Payer: COMMERCIAL

## 2022-05-29 ENCOUNTER — TRANSFERRED RECORDS (OUTPATIENT)
Dept: HEALTH INFORMATION MANAGEMENT | Facility: CLINIC | Age: 67
End: 2022-05-29
Payer: COMMERCIAL

## 2022-07-27 DIAGNOSIS — F33.0 MAJOR DEPRESSIVE DISORDER, RECURRENT EPISODE, MILD (H): ICD-10-CM

## 2022-07-27 DIAGNOSIS — I10 HTN, GOAL BELOW 140/90: ICD-10-CM

## 2022-07-28 RX ORDER — LISINOPRIL 30 MG/1
30 TABLET ORAL DAILY
Qty: 90 TABLET | Refills: 3 | OUTPATIENT
Start: 2022-07-28

## 2022-07-28 RX ORDER — BUPROPION HYDROCHLORIDE 300 MG/1
300 TABLET ORAL EVERY MORNING
Qty: 90 TABLET | Refills: 3 | OUTPATIENT
Start: 2022-07-28

## 2022-09-02 NOTE — RESULT ENCOUNTER NOTE
Tomeka,    Your COVID test was negative.  It looks like they were not able to run the flu test due to it having too much blood on the swab (sorry to hear about that!), but based on your symptoms, it is unlikely to be the flu.      I hope the inhalers are helping with your symptoms.  Please let us know if you have any questions.    Best,   Jona Brooks MD     no

## 2022-09-20 DIAGNOSIS — I10 HTN, GOAL BELOW 140/90: ICD-10-CM

## 2022-09-20 DIAGNOSIS — F33.0 MAJOR DEPRESSIVE DISORDER, RECURRENT EPISODE, MILD (H): ICD-10-CM

## 2022-09-20 NOTE — LETTER
September 23, 2022      Breonna Caruso  [address] [place] [address]        Breonna Caruso      We recently received a refill request for your buPROPion (WELLBUTRIN XL) 300 MG 24 hr tablet and lisinopril (ZESTRIL) 30 MG tablet    We have sent in a refill for you to your pharmacy.    Our records show you are due for a follow up visit with your provider.     Please call the clinic at [phone] to schedule as the providers are booking out.      Sincerely,        Elsi Hernandez/

## 2022-09-21 RX ORDER — BUPROPION HYDROCHLORIDE 300 MG/1
TABLET ORAL
Qty: 90 TABLET | Refills: 0 | Status: SHIPPED | OUTPATIENT
Start: 2022-09-21 | End: 2022-11-15

## 2022-09-21 RX ORDER — LISINOPRIL 30 MG/1
TABLET ORAL
Qty: 90 TABLET | Refills: 0 | Status: SHIPPED | OUTPATIENT
Start: 2022-09-21 | End: 2023-02-13

## 2022-09-21 NOTE — TELEPHONE ENCOUNTER
Prescription approved per [facility] Refill Protocol.  Pt due for feliz ZUÑIGA RN

## 2022-09-23 NOTE — TELEPHONE ENCOUNTER
Left message for patient to return call, please help schedule follow up   Sent letter      Elsi Hernandez/

## 2022-10-03 DIAGNOSIS — F33.0 MAJOR DEPRESSIVE DISORDER, RECURRENT EPISODE, MILD (H): ICD-10-CM

## 2022-10-03 DIAGNOSIS — I10 HTN, GOAL BELOW 140/90: ICD-10-CM

## 2022-10-04 RX ORDER — BUPROPION HYDROCHLORIDE 300 MG/1
TABLET ORAL
Qty: 90 TABLET | Refills: 0 | OUTPATIENT
Start: 2022-10-04

## 2022-10-04 RX ORDER — LISINOPRIL 30 MG/1
TABLET ORAL
Qty: 90 TABLET | Refills: 0 | OUTPATIENT
Start: 2022-10-04

## 2022-10-04 NOTE — TELEPHONE ENCOUNTER
Called and spoke with pt regarding new pharmacy request. Pt states she does not want Rx transferred out of state, pt will contact pharmacy to inform she is no longer getting medications in [place]. No further questions.     Erik ARMSTRONG RN

## 2022-11-15 ENCOUNTER — OFFICE VISIT (OUTPATIENT)
Dept: FAMILY MEDICINE | Facility: CLINIC | Age: 67
End: 2022-11-15
Payer: COMMERCIAL

## 2022-11-15 VITALS
RESPIRATION RATE: 12 BRPM | WEIGHT: 146 LBS | DIASTOLIC BLOOD PRESSURE: 84 MMHG | HEIGHT: 65 IN | TEMPERATURE: 97.7 F | HEART RATE: 60 BPM | SYSTOLIC BLOOD PRESSURE: 128 MMHG | BODY MASS INDEX: 24.32 KG/M2 | OXYGEN SATURATION: 99 %

## 2022-11-15 DIAGNOSIS — Z13.1 SCREENING FOR DIABETES MELLITUS: ICD-10-CM

## 2022-11-15 DIAGNOSIS — E78.5 HYPERLIPIDEMIA LDL GOAL <160: ICD-10-CM

## 2022-11-15 DIAGNOSIS — F33.0 MAJOR DEPRESSIVE DISORDER, RECURRENT EPISODE, MILD (H): ICD-10-CM

## 2022-11-15 DIAGNOSIS — Z00.00 ENCOUNTER FOR MEDICARE ANNUAL WELLNESS EXAM: Primary | ICD-10-CM

## 2022-11-15 DIAGNOSIS — Z13.220 SCREENING, LIPID: ICD-10-CM

## 2022-11-15 DIAGNOSIS — R06.83 SNORING: ICD-10-CM

## 2022-11-15 DIAGNOSIS — Z13.0 SCREENING FOR BLOOD DISEASE: ICD-10-CM

## 2022-11-15 DIAGNOSIS — Z78.0 POST-MENOPAUSAL: ICD-10-CM

## 2022-11-15 LAB
ALBUMIN SERPL BCG-MCNC: 4.6 G/DL (ref 3.5–5.2)
ALP SERPL-CCNC: 61 U/L (ref 35–104)
ALT SERPL W P-5'-P-CCNC: 30 U/L (ref 10–35)
ANION GAP SERPL CALCULATED.3IONS-SCNC: 14 MMOL/L (ref 7–15)
AST SERPL W P-5'-P-CCNC: 27 U/L (ref 10–35)
BILIRUB SERPL-MCNC: 0.3 MG/DL
BUN SERPL-MCNC: 13.3 MG/DL (ref 8–23)
CALCIUM SERPL-MCNC: 9.7 MG/DL (ref 8.8–10.2)
CHLORIDE SERPL-SCNC: 103 MMOL/L (ref 98–107)
CHOLEST SERPL-MCNC: 186 MG/DL
CREAT SERPL-MCNC: 0.69 MG/DL (ref 0.51–0.95)
DEPRECATED HCO3 PLAS-SCNC: 25 MMOL/L (ref 22–29)
ERYTHROCYTE [DISTWIDTH] IN BLOOD BY AUTOMATED COUNT: 12.9 % (ref 10–15)
GFR SERPL CREATININE-BSD FRML MDRD: >90 ML/MIN/1.73M2
GLUCOSE SERPL-MCNC: 90 MG/DL (ref 70–99)
HCT VFR BLD AUTO: 41.3 % (ref 35–47)
HDLC SERPL-MCNC: 65 MG/DL
HGB BLD-MCNC: 13.7 G/DL (ref 11.7–15.7)
LDLC SERPL CALC-MCNC: 102 MG/DL
MCH RBC QN AUTO: 31.9 PG (ref 26.5–33)
MCHC RBC AUTO-ENTMCNC: 33.2 G/DL (ref 31.5–36.5)
MCV RBC AUTO: 96 FL (ref 78–100)
NONHDLC SERPL-MCNC: 121 MG/DL
PLATELET # BLD AUTO: 233 10E3/UL (ref 150–450)
POTASSIUM SERPL-SCNC: 3.8 MMOL/L (ref 3.4–5.3)
PROT SERPL-MCNC: 6.9 G/DL (ref 6.4–8.3)
RBC # BLD AUTO: 4.29 10E6/UL (ref 3.8–5.2)
SODIUM SERPL-SCNC: 142 MMOL/L (ref 136–145)
TRIGL SERPL-MCNC: 97 MG/DL
WBC # BLD AUTO: 5 10E3/UL (ref 4–11)

## 2022-11-15 PROCEDURE — 99214 OFFICE O/P EST MOD 30 MIN: CPT | Mod: 25 | Performed by: PHYSICIAN ASSISTANT

## 2022-11-15 PROCEDURE — G0439 PPPS, SUBSEQ VISIT: HCPCS | Mod: 25 | Performed by: PHYSICIAN ASSISTANT

## 2022-11-15 PROCEDURE — 80061 LIPID PANEL: CPT | Performed by: PHYSICIAN ASSISTANT

## 2022-11-15 PROCEDURE — 85027 COMPLETE CBC AUTOMATED: CPT | Performed by: PHYSICIAN ASSISTANT

## 2022-11-15 PROCEDURE — 36415 COLL VENOUS BLD VENIPUNCTURE: CPT | Performed by: PHYSICIAN ASSISTANT

## 2022-11-15 PROCEDURE — 90715 TDAP VACCINE 7 YRS/> IM: CPT | Performed by: PHYSICIAN ASSISTANT

## 2022-11-15 PROCEDURE — 90471 IMMUNIZATION ADMIN: CPT | Performed by: PHYSICIAN ASSISTANT

## 2022-11-15 PROCEDURE — 91312 COVID-19,PF,PFIZER BOOSTER BIVALENT: CPT | Performed by: PHYSICIAN ASSISTANT

## 2022-11-15 PROCEDURE — 80053 COMPREHEN METABOLIC PANEL: CPT | Performed by: PHYSICIAN ASSISTANT

## 2022-11-15 PROCEDURE — 0124A COVID-19,PF,PFIZER BOOSTER BIVALENT: CPT | Performed by: PHYSICIAN ASSISTANT

## 2022-11-15 RX ORDER — METRONIDAZOLE 500 MG/1
TABLET ORAL EVERY 8 HOURS
COMMUNITY
Start: 2022-05-24 | End: 2023-09-20

## 2022-11-15 RX ORDER — SIMVASTATIN 40 MG
40 TABLET ORAL AT BEDTIME
Qty: 90 TABLET | Refills: 3 | Status: SHIPPED | OUTPATIENT
Start: 2022-11-15 | End: 2023-09-20

## 2022-11-15 RX ORDER — BUPROPION HYDROCHLORIDE 300 MG/1
300 TABLET ORAL EVERY MORNING
Qty: 90 TABLET | Refills: 1 | Status: SHIPPED | OUTPATIENT
Start: 2022-11-15 | End: 2023-07-11

## 2022-11-15 SDOH — ECONOMIC STABILITY: TRANSPORTATION INSECURITY
IN THE PAST 12 MONTHS, HAS THE LACK OF TRANSPORTATION KEPT YOU FROM MEDICAL APPOINTMENTS OR FROM GETTING MEDICATIONS?: NO

## 2022-11-15 SDOH — ECONOMIC STABILITY: INCOME INSECURITY: IN THE LAST 12 MONTHS, WAS THERE A TIME WHEN YOU WERE NOT ABLE TO PAY THE MORTGAGE OR RENT ON TIME?: NO

## 2022-11-15 SDOH — ECONOMIC STABILITY: INCOME INSECURITY: HOW HARD IS IT FOR YOU TO PAY FOR THE VERY BASICS LIKE FOOD, HOUSING, MEDICAL CARE, AND HEATING?: NOT HARD AT ALL

## 2022-11-15 SDOH — HEALTH STABILITY: PHYSICAL HEALTH: ON AVERAGE, HOW MANY DAYS PER WEEK DO YOU ENGAGE IN MODERATE TO STRENUOUS EXERCISE (LIKE A BRISK WALK)?: 0 DAYS

## 2022-11-15 SDOH — HEALTH STABILITY: PHYSICAL HEALTH: ON AVERAGE, HOW MANY MINUTES DO YOU ENGAGE IN EXERCISE AT THIS LEVEL?: 0 MIN

## 2022-11-15 SDOH — ECONOMIC STABILITY: FOOD INSECURITY: WITHIN THE PAST 12 MONTHS, YOU WORRIED THAT YOUR FOOD WOULD RUN OUT BEFORE YOU GOT MONEY TO BUY MORE.: NEVER TRUE

## 2022-11-15 SDOH — ECONOMIC STABILITY: TRANSPORTATION INSECURITY
IN THE PAST 12 MONTHS, HAS LACK OF TRANSPORTATION KEPT YOU FROM MEETINGS, WORK, OR FROM GETTING THINGS NEEDED FOR DAILY LIVING?: NO

## 2022-11-15 SDOH — ECONOMIC STABILITY: FOOD INSECURITY: WITHIN THE PAST 12 MONTHS, THE FOOD YOU BOUGHT JUST DIDN'T LAST AND YOU DIDN'T HAVE MONEY TO GET MORE.: NEVER TRUE

## 2022-11-15 ASSESSMENT — PATIENT HEALTH QUESTIONNAIRE - PHQ9
SUM OF ALL RESPONSES TO PHQ QUESTIONS 1-9: 0
SUM OF ALL RESPONSES TO PHQ QUESTIONS 1-9: 0
10. IF YOU CHECKED OFF ANY PROBLEMS, HOW DIFFICULT HAVE THESE PROBLEMS MADE IT FOR YOU TO DO YOUR WORK, TAKE CARE OF THINGS AT HOME, OR GET ALONG WITH OTHER PEOPLE: NOT DIFFICULT AT ALL

## 2022-11-15 ASSESSMENT — SOCIAL DETERMINANTS OF HEALTH (SDOH)
HOW OFTEN DO YOU ATTEND CHURCH OR RELIGIOUS SERVICES?: NEVER
DO YOU BELONG TO ANY CLUBS OR ORGANIZATIONS SUCH AS CHURCH GROUPS UNIONS, FRATERNAL OR ATHLETIC GROUPS, OR SCHOOL GROUPS?: NO
IN A TYPICAL WEEK, HOW MANY TIMES DO YOU TALK ON THE PHONE WITH FAMILY, FRIENDS, OR NEIGHBORS?: MORE THAN THREE TIMES A WEEK
HOW OFTEN DO YOU GET TOGETHER WITH FRIENDS OR RELATIVES?: NEVER

## 2022-11-15 ASSESSMENT — LIFESTYLE VARIABLES
HOW MANY STANDARD DRINKS CONTAINING ALCOHOL DO YOU HAVE ON A TYPICAL DAY: 1 OR 2
AUDIT-C TOTAL SCORE: 4
HOW OFTEN DO YOU HAVE A DRINK CONTAINING ALCOHOL: 2-3 TIMES A WEEK
HOW OFTEN DO YOU HAVE SIX OR MORE DRINKS ON ONE OCCASION: LESS THAN MONTHLY
SKIP TO QUESTIONS 9-10: 0

## 2022-11-15 ASSESSMENT — ENCOUNTER SYMPTOMS
ARTHRALGIAS: 0
ABDOMINAL PAIN: 0
PALPITATIONS: 0
HEMATURIA: 0
EYE PAIN: 0
HEARTBURN: 0
HEMATOCHEZIA: 0
DYSURIA: 0
NERVOUS/ANXIOUS: 0
CHILLS: 0
PARESTHESIAS: 0
COUGH: 0
HEADACHES: 0
NAUSEA: 0
FREQUENCY: 0
CONSTIPATION: 0
WEAKNESS: 0
DIARRHEA: 0
DIZZINESS: 0
BREAST MASS: 0
MYALGIAS: 0
JOINT SWELLING: 0
FEVER: 0
SHORTNESS OF BREATH: 0
SORE THROAT: 0

## 2022-11-15 ASSESSMENT — ASTHMA QUESTIONNAIRES
QUESTION_2 LAST FOUR WEEKS HOW OFTEN HAVE YOU HAD SHORTNESS OF BREATH: NOT AT ALL
ACT_TOTALSCORE: 25
QUESTION_5 LAST FOUR WEEKS HOW WOULD YOU RATE YOUR ASTHMA CONTROL: COMPLETELY CONTROLLED
ACT_TOTALSCORE: 25
QUESTION_3 LAST FOUR WEEKS HOW OFTEN DID YOUR ASTHMA SYMPTOMS (WHEEZING, COUGHING, SHORTNESS OF BREATH, CHEST TIGHTNESS OR PAIN) WAKE YOU UP AT NIGHT OR EARLIER THAN USUAL IN THE MORNING: NOT AT ALL
QUESTION_1 LAST FOUR WEEKS HOW MUCH OF THE TIME DID YOUR ASTHMA KEEP YOU FROM GETTING AS MUCH DONE AT WORK, SCHOOL OR AT HOME: NONE OF THE TIME
QUESTION_4 LAST FOUR WEEKS HOW OFTEN HAVE YOU USED YOUR RESCUE INHALER OR NEBULIZER MEDICATION (SUCH AS ALBUTEROL): NOT AT ALL

## 2022-11-15 ASSESSMENT — ACTIVITIES OF DAILY LIVING (ADL): CURRENT_FUNCTION: NO ASSISTANCE NEEDED

## 2022-11-15 NOTE — PATIENT INSTRUCTIONS
Patient Education   Personalized Prevention Plan  You are due for the preventive services outlined below.  Your care team is available to assist you in scheduling these services.  If you have already completed any of these items, please share that information with your care team to update in your medical record.  Health Maintenance Due   Topic Date Due    Headache Impact Test Assessment  Never done    Zoster (Shingles) Vaccine (2 of 2) 12/07/2020    Asthma Action Plan - yearly  10/12/2021    COVID-19 Vaccine (4 - Booster for Pfizer series) 11/25/2021    Diptheria Tetanus Pertussis (DTAP/TDAP/TD) Vaccine (2 - Td or Tdap) 01/06/2022    ANNUAL REVIEW OF HM ORDERS  06/02/2022    Osteoporosis Screening  09/25/2022    Annual Wellness Visit  09/30/2022       Preventing Falls at Home  A person can fall for many reasons. Older adults may fall because reaction time slows down as we age. Your muscles and joints may get stiff, weak, or less flexible because of illness, medicines, or a physical condition.   Other health problems that make falls more likely include:   Arthritis  Dizziness or lightheadedness when you stand up (orthostatic hypotension)  History of a stroke  Dizziness  Anemia  Certain medicines taken for mental illness or to control blood pressure.  Problems with balance or gait  Bladder or urinary problems  History of falling  Changes in vision (vision impairment)  Changes in thinking skills and memory (cognitive impairment)  Injuries from a fall can include serious injuries such as broken bones, dislocated joints, internal bleeding and cuts. Injuries like these can limit your independence.   Prevention tips  To help prevent falls and fall-related injuries, follow the tips below.    Floors  To make floors safer:   Put nonskid pads under area rugs.  Remove small rugs.  Replace worn floor coverings.  Tack carpets firmly to each step on carpeted stairs. Put nonskid strips on the edges of uncarpeted stairs.  Keep  floors and stairs free of clutter and cords.  Arrange furniture so there are clear pathways.  Clean up any spills right away.  Bathrooms    To make bathrooms safer:   Install grab bars in the tub or shower.  Apply nonskid strips or put a nonskid rubber mat in the tub or shower.  Sit on a bath chair to bathe.  Use bathmats with nonskid backing.  Lighting  To improve visibility in your home:    Keep a flashlight in each room. Or put a lamp next to the bed within easy reach.  Put nightlights in the bedrooms, hallways, kitchen, and bathrooms.  Make sure all stairways have good lighting.  Take your time when going up and down stairs.  Put handrails on both sides of stairs and in walkways for more support. To prevent injury to your wrist or arm, don t use handrails to pull yourself up.  Install grab bars to pull yourself up.  Move or rearrange items that you use often. This will make them easier to find or reach.  Look at your home to find any safety hazards. Especially look at doorways, walkways, and the driveway. Remove or repair any safety problems that you find.  Other changes to make  Look around to find any safety hazards. Look closely at doorways, walkways, and the driveway. Remove or repair any safety problems that you find.  Wear shoes that fit well.  Take your time when going up and down stairs.  Put handrails on both sides of stairs and in walkways for more support. To prevent injury to your wrist or arm, don t use handrails to pull yourself up.  Install grab bars wherever needed to pull yourself up.  Arrange items that you use often. This will make them easier to find or reach.    [employer] last reviewed this educational content on 3/1/2020    4333-8330 [employer]. All rights reserved. This information is not intended as a substitute for professional medical care. Always follow your healthcare professional's instructions.           At your visit today, we discussed your risk for falls and  preventive options.    Fall Prevention  Falls often occur due to slipping, tripping or losing your balance. Millions of people fall every year and injure themselves. Here are ways to reduce your risk of falling again.   Think about your fall, was there anything that caused your fall that can be fixed, removed, or replaced?  Make your home safe by keeping walkways clear of objects you may trip over, such as electric cords.  Use non-slip pads under rugs. Don't use area rugs or small throw rugs.  Use non-slip mats in bathtubs and showers.  Install handrails and lights on staircases. The handrails should be on both sides of the stairs.  Don't walk in poorly lit areas.  Don't stand on chairs or wobbly ladders.  Use caution when reaching overhead or looking upward. This position can cause a loss of balance.  Be sure your shoes fit properly, have non-slip bottoms and are in good condition.   Wear shoes both inside and out. Don't go barefoot or wear slippers.  Be cautious when going up and down stairs, curbs, and when walking on uneven sidewalks.  If your balance is poor, consider using a cane or walker.  If your fall was related to alcohol use, stop or limit alcohol intake.   If your fall was related to use of sleeping medicines, talk to your healthcare provider about this. You may need to reduce your dosage at bedtime if you awaken during the night to go to the bathroom.    To reduce the need for nighttime bathroom trips:  Don't drink fluids for several hours before going to bed  Empty your bladder before going to bed  Men can keep a urinal at the bedside  Stay as active as you can. Balance, flexibility, strength, and endurance all come from exercise. They all play a role in preventing falls. Ask your healthcare provider which types of activity are right for you.  Get your vision checked on a regular basis.  If you have pets, know where they are before you stand up or walk so you don't trip over them.  Use night  lights.  Go over all your medicines with a pharmacist or other healthcare provider to see if any of them could make you more likely to fall.  Francesca last reviewed this educational content on 4/1/2018 2000-2021 [employer]. All rights reserved. This information is not intended as a substitute for professional medical care. Always follow your healthcare professional's instructions.

## 2022-11-15 NOTE — PROGRESS NOTES
"sleep  She is at risk for falling and has been provided with information to red  SUBJECTIVE:   Tomeka is a 67 year old who presents for Preventive Visit.      Patient has been advised of split billing requirements and indicates understanding: Yes  Are you in the first 12 months of your Medicare coverage?  No    Healthy Habits:     In general, how would you rate your overall health?  Good    Frequency of exercise:  None    Do you usually eat at least 4 servings of fruit and vegetables a day, include whole grains    & fiber and avoid regularly eating high fat or \"junk\" foods?  No    Taking medications regularly:  Yes    Medication side effects:  Not applicable    Ability to successfully perform activities of daily living:  No assistance needed    Home Safety:  No safety concerns identified    Hearing Impairment:  No hearing concerns    In the past 6 months, have you been bothered by leaking of urine?  No    In general, how would you rate your overall mental or emotional health?  Good      PHQ-2 Total Score: 0    Additional concerns today:  No    Do you feel safe in your environment? Yes    Have you ever done Advance Care Planning? (For example, a Health Directive, POLST, or a discussion with a medical provider or your loved ones about your wishes): Yes, advance care planning is on file.       Fall risk  Fallen 2 or more times in the past year?: Yes  Any fall with injury in the past year?: Yes  click delete button to remove this line now  Cognitive Screening   1) Repeat 3 items (Leader, Season, Table)    2) Clock draw: NORMAL  3) 3 item recall: Recalls 3 objects  Results: NORMAL clock, 1-2 items recalled: COGNITIVE IMPAIRMENT LESS LIKELY    Mini-CogTM Copyright GERARDO Rosario. Licensed by the author for use in [facility]; reprinted with permission (edward@.[facility]). All rights reserved.      Do you have sleep apnea, excessive snoring or daytime drowsiness?: yes    Reviewed and updated as needed this visit by " clinical staff                  Reviewed and updated as needed this visit by Provider                 Social History     Tobacco Use     Smoking status: Never     Smokeless tobacco: Never   Substance Use Topics     Alcohol use: Yes     Comment: some wine/beer         Alcohol Use 11/15/2022   Prescreen: >3 drinks/day or >7 drinks/week? No   Prescreen: >3 drinks/day or >7 drinks/week? -               Current providers sharing in care for this patient include:   Patient Care Team:  No Ref-Primary, Physician as PCP - General Aaseby-Aguilera, Ramona Ann, PA-C as Assigned PCP    The following health maintenance items are reviewed in Epic and correct as of today:  Health Maintenance   Topic Date Due     HIT-6  Never done     ZOSTER IMMUNIZATION (2 of 2) 12/07/2020     ASTHMA ACTION PLAN  10/12/2021     COVID-19 Vaccine (4 - Booster for Pfizer series) 11/25/2021     DTAP/TDAP/TD IMMUNIZATION (2 - Td or Tdap) 01/06/2022     ANNUAL REVIEW OF HM ORDERS  06/02/2022     DEXA  09/25/2022     MEDICARE ANNUAL WELLNESS VISIT  09/30/2022     ASTHMA CONTROL TEST  05/15/2023     PHQ-9  05/15/2023     MAMMO SCREENING  10/18/2023     FALL RISK ASSESSMENT  11/15/2023     LIPID  09/30/2026     ADVANCE CARE PLANNING  12/22/2026     COLORECTAL CANCER SCREENING  08/16/2031     HEPATITIS C SCREENING  Completed     DEPRESSION ACTION PLAN  Completed     MIGRAINE ACTION PLAN  Completed     INFLUENZA VACCINE  Completed     Pneumococcal Vaccine: 65+ Years  Completed     IPV IMMUNIZATION  Aged Out     MENINGITIS IMMUNIZATION  Aged Out     BP Readings from Last 3 Encounters:   11/15/22 128/84   09/30/21 130/80   06/14/21 138/77    Wt Readings from Last 3 Encounters:   11/15/22 66.2 kg (146 lb)   09/30/21 68 kg (150 lb)   06/14/21 67.1 kg (148 lb)                  Recent Labs   Lab Test 09/30/21  1214 06/14/21  1102 10/12/20  1057 07/28/20  1341 12/26/19  1259 08/07/19  0821 01/01/19  1227 04/24/18  0908 06/01/17  1458 05/18/17  0906   *  --   116*  --   --  101*  --  89  --  104*   HDL 54  --  71  --   --  39*  --  38*  --  46*   TRIG 136  --  117  --   --  268*  --  84  --  98   ALT 33 27  --  26   < > 61*   < > 29   < > 71*   CR 0.64 0.71  --  0.66   < > 0.72   < > 0.63   < > 0.71   GFRESTIMATED >90 89  --  >90   < > 88   < > >90   < > 83   GFRESTBLACK  --  >90  --  >90   < > >90   < > >90   < > >90  African American GFR Calc     POTASSIUM 3.6 4.1  --  3.9   < > 4.2   < > 4.2   < > 4.1   TSH  --   --   --   --   --   --   --  1.14  --  1.47    < > = values in this interval not displayed.          Breast CA Risk Assessment (FHS-7) 11/15/2022   Do you have a family history of breast, colon, or ovarian cancer? No / Unknown         Mammogram Screening: Recommended mammography every 1-2 years with patient discussion and risk factor consideration  Pertinent mammograms are reviewed under the imaging tab.    Review of Systems   Constitutional: Negative for chills and fever.   HENT: Negative for congestion, ear pain, hearing loss and sore throat.    Eyes: Negative for pain and visual disturbance.   Respiratory: Negative for cough and shortness of breath.    Cardiovascular: Negative for chest pain, palpitations and peripheral edema.   Gastrointestinal: Negative for abdominal pain, constipation, diarrhea, heartburn, hematochezia and nausea.   Breasts:  Negative for tenderness, breast mass and discharge.   Genitourinary: Negative for dysuria, frequency, genital sores, hematuria, pelvic pain, urgency, vaginal bleeding and vaginal discharge.   Musculoskeletal: Negative for arthralgias, joint swelling and myalgias.   Skin: Negative for rash.   Neurological: Negative for dizziness, weakness, headaches and paresthesias.   Psychiatric/Behavioral: Negative for mood changes. The patient is not nervous/anxious.      Constitutional, HEENT, cardiovascular, pulmonary, gi and gu systems are negative, except as otherwise noted.    OBJECTIVE:   LMP  (LMP Unknown)  Estimated body  "mass index is 24.77 kg/m  as calculated from the following:    Height as of 9/30/21: 1.657 m (5' 5.25\").    Weight as of 9/30/21: 68 kg (150 lb).  Physical Exam  GENERAL APPEARANCE: healthy, alert and no distress  EYES: Eyes grossly normal to inspection, PERRL and conjunctivae and sclerae normal  HENT: ear canals and TM's normal, nose and mouth without ulcers or lesions, oropharynx clear and oral mucous membranes moist  NECK: no adenopathy, no asymmetry, masses, or scars and thyroid normal to palpation  RESP: lungs clear to auscultation - no rales, rhonchi or wheezes  BREAST: normal without masses, tenderness or nipple discharge and no palpable axillary masses or adenopathy  CV: regular rate and rhythm, normal S1 S2, no S3 or S4, no murmur, click or rub, no peripheral edema and peripheral pulses strong  ABDOMEN: soft, nontender, no hepatosplenomegaly, no masses and bowel sounds normal  MS: no musculoskeletal defects are noted and gait is age appropriate without ataxia  SKIN: no suspicious lesions or rashes  NEURO: Normal strength and tone, sensory exam grossly normal, mentation intact and speech normal  PSYCH: mentation appears normal and affect normal/bright    Diagnostic Test Results:  Labs reviewed in Epic    ASSESSMENT / PLAN:   (Z00.00) Encounter for Medicare annual wellness exam  (primary encounter diagnosis)  Comment: would like sleep apnea referral for snoring  Plan: follow up in 1 year.     (F33.0) Major depressive disorder, recurrent episode, mild (HCC)  Comment: stable   Plan: buPROPion (WELLBUTRIN XL) 300 MG 24 hr tablet            (E78.5) Hyperlipidemia LDL goal <160  Comment: stable. Labs ordered today  Plan: simvastatin (ZOCOR) 40 MG tablet            (Z13.1) Screening for diabetes mellitus  Comment:   Plan: Comprehensive metabolic panel            (Z13.220) Screening, lipid  Comment: fasting today  Plan: Lipid Profile            (Z13.0) Screening for blood disease  Comment:   Plan: CBC with " "platelets            (Z78.0) Post-menopausal  Comment: has not had this done since 2019  Plan: DEXA HIP/PELVIS/SPINE - Future              Patient has been advised of split billing requirements and indicates understanding: Yes      COUNSELING:  Reviewed preventive health counseling, as reflected in patient instructions       Regular exercise       Healthy diet/nutrition       Fall risk prevention       Immunizations  Vaccinated for: TDAP and COVID         Osteoporosis prevention/bone health       Colon cancer screening    Estimated body mass index is 24.77 kg/m  as calculated from the following:    Height as of 9/30/21: 1.657 m (5' 5.25\").    Weight as of 9/30/21: 68 kg (150 lb).        She reports that she has never smoked. She has never used smokeless tobacco.      Appropriate preventive services were discussed with this patient, including applicable screening as appropriate for cardiovascular disease, diabetes, osteopenia/osteoporosis, and glaucoma.  As appropriate for age/gender, discussed screening for colorectal cancer, prostate cancer, breast cancer, and cervical cancer. Checklist reviewing preventive services available has been given to the patient.    Reviewed patients plan of care and provided an AVS. The Basic Care Plan (routine screening as documented in Health Maintenance) for Breonna meets the Care Plan requirement. This Care Plan has been established and reviewed with the Patient.    Counseling Resources:  ATP IV Guidelines  Pooled Cohorts Equation Calculator  Breast Cancer Risk Calculator  Breast Cancer: Medication to Reduce Risk  FRAX Risk Assessment  ICSI Preventive Guidelines  Dietary Guidelines for Americans, 2010  USDA's MyPlate  ASA Prophylaxis  Lung CA Screening    Poonam Taylor, student nurse practitioner  Ramona Ann Aaseby-Aguilera, PA-C [facility]      Identified Health Risks:uce the risk of falling at home.  Answers for HPI/ROS submitted by the patient on " 11/15/2022  If you checked off any problems, how difficult have these problems made it for you to do your work, take care of things at home, or get along with other people?: Not difficult at all  PHQ9 TOTAL SCORE: 0

## 2022-11-16 ENCOUNTER — ANCILLARY PROCEDURE (OUTPATIENT)
Dept: MAMMOGRAPHY | Facility: CLINIC | Age: 67
End: 2022-11-16
Payer: COMMERCIAL

## 2022-11-16 DIAGNOSIS — Z12.31 VISIT FOR SCREENING MAMMOGRAM: ICD-10-CM

## 2022-11-16 PROCEDURE — 77067 SCR MAMMO BI INCL CAD: CPT | Mod: GC | Performed by: RADIOLOGY

## 2022-11-16 PROCEDURE — 77063 BREAST TOMOSYNTHESIS BI: CPT | Mod: GC | Performed by: RADIOLOGY

## 2023-07-10 DIAGNOSIS — F33.0 MAJOR DEPRESSIVE DISORDER, RECURRENT EPISODE, MILD (H): ICD-10-CM

## 2023-07-11 RX ORDER — BUPROPION HYDROCHLORIDE 300 MG/1
TABLET ORAL
Qty: 90 TABLET | Refills: 1 | Status: SHIPPED | OUTPATIENT
Start: 2023-07-11 | End: 2023-09-20

## 2023-07-11 NOTE — TELEPHONE ENCOUNTER
Routing refill request to provider for review/approval because:  Patient needs to be seen because:  j      10/12/2020    10:02 AM 4/13/2021     4:47 PM 11/15/2022    11:54 AM   PHQ   PHQ-9 Total Score 1 2 0   Q9: Thoughts of better off dead/self-harm past 2 weeks Not at all Not at all Not at all

## 2023-08-21 ENCOUNTER — MYC MEDICAL ADVICE (OUTPATIENT)
Dept: FAMILY MEDICINE | Facility: CLINIC | Age: 68
End: 2023-08-21
Payer: COMMERCIAL

## 2023-09-13 ASSESSMENT — ENCOUNTER SYMPTOMS
HEADACHES: 0
PALPITATIONS: 0
HEMATOCHEZIA: 0
NERVOUS/ANXIOUS: 0
SORE THROAT: 0
PARESTHESIAS: 0
CONSTIPATION: 0
CHILLS: 0
HEMATURIA: 0
DIARRHEA: 0
DYSURIA: 0
FEVER: 0
DIZZINESS: 0
NAUSEA: 0
BREAST MASS: 0
SHORTNESS OF BREATH: 0
WEAKNESS: 0
JOINT SWELLING: 0
ABDOMINAL PAIN: 0
EYE PAIN: 0
FREQUENCY: 0
MYALGIAS: 0
COUGH: 0
ARTHRALGIAS: 0
HEARTBURN: 0

## 2023-09-13 ASSESSMENT — ACTIVITIES OF DAILY LIVING (ADL): CURRENT_FUNCTION: NO ASSISTANCE NEEDED

## 2023-09-13 ASSESSMENT — ASTHMA QUESTIONNAIRES: ACT_TOTALSCORE: 24

## 2023-09-15 ENCOUNTER — OFFICE VISIT (OUTPATIENT)
Dept: URGENT CARE | Facility: URGENT CARE | Age: 68
End: 2023-09-15
Payer: COMMERCIAL

## 2023-09-15 VITALS
WEIGHT: 144.3 LBS | SYSTOLIC BLOOD PRESSURE: 125 MMHG | OXYGEN SATURATION: 95 % | BODY MASS INDEX: 24.01 KG/M2 | DIASTOLIC BLOOD PRESSURE: 77 MMHG | TEMPERATURE: 97.4 F | HEART RATE: 68 BPM

## 2023-09-15 DIAGNOSIS — J01.90 ACUTE SINUSITIS WITH SYMPTOMS > 10 DAYS: Primary | ICD-10-CM

## 2023-09-15 PROCEDURE — 99214 OFFICE O/P EST MOD 30 MIN: CPT | Performed by: PHYSICIAN ASSISTANT

## 2023-09-15 RX ORDER — DOXYCYCLINE HYCLATE 100 MG
100 TABLET ORAL 2 TIMES DAILY
Qty: 20 TABLET | Refills: 0 | Status: SHIPPED | OUTPATIENT
Start: 2023-09-15 | End: 2023-09-25

## 2023-09-15 ASSESSMENT — ENCOUNTER SYMPTOMS
MUSCULOSKELETAL NEGATIVE: 1
LIGHT-HEADEDNESS: 0
PALPITATIONS: 0
WEAKNESS: 0
COUGH: 1
NAUSEA: 0
BACK PAIN: 0
NECK PAIN: 0
MYALGIAS: 0
HEADACHES: 0
NECK STIFFNESS: 0
VOMITING: 0
JOINT SWELLING: 0
DIZZINESS: 0
FEVER: 0
SHORTNESS OF BREATH: 0
WOUND: 0
EYES NEGATIVE: 1
ENDOCRINE NEGATIVE: 1
ALLERGIC/IMMUNOLOGIC NEGATIVE: 1
SORE THROAT: 0
RHINORRHEA: 1
ARTHRALGIAS: 0
CARDIOVASCULAR NEGATIVE: 1
CHILLS: 0
DIARRHEA: 0

## 2023-09-15 NOTE — PROGRESS NOTES
Chief Complaint:     Chief Complaint   Patient presents with    Sinus Problem     Pt c/o of sinus drainage since July worsening, also having sore throat and cough, pt has been taking Sudafed not seeming to help       No results found for any visits on 09/15/23.    Medical Decision Making:    Vital signs reviewed by Wili Kendrick PA-C  /77   Pulse 68   Temp 97.4  F (36.3  C) (Tympanic)   Wt 65.5 kg (144 lb 4.8 oz)   LMP  (LMP Unknown)   SpO2 95%   BMI 24.01 kg/m      Differential Diagnosis:  URI Adult/Peds:  Bronchitis-viral, Sinusitis, and Viral upper respiratory illness        ASSESSMENT    1. Acute sinusitis with symptoms > 10 days        PLAN    Patient is in no acute distress.    Temp is 97.4 in clinic today, lung sounds were clear, and O2 sats at 95% on RA.    With length of symptoms, Rx for Doxycycline today.  Rest, Push fluids, vaporizer, elevation of head of bed.  Ibuprofen and or Tylenol for any fever or body aches.  Over the counter cough suppressant- PRN- as discussed.   If symptoms worsen, recheck immediately otherwise follow up with your PCP in 1 week if symptoms are not improving.  Worrisome symptoms discussed with instructions to go to the ED.  Patient verbalized understanding and agreed with this plan.    Labs:    No results found for any visits on 09/15/23.     Vital signs reviewed by Wili Kendrick PA-C  /77   Pulse 68   Temp 97.4  F (36.3  C) (Tympanic)   Wt 65.5 kg (144 lb 4.8 oz)   LMP  (LMP Unknown)   SpO2 95%   BMI 24.01 kg/m      Current Meds      Current Outpatient Medications:     buPROPion (WELLBUTRIN XL) 300 MG 24 hr tablet, TAKE 1 TABLET BY MOUTH EVERY MORNING, Disp: 90 tablet, Rfl: 1    cetirizine HCl 10 MG CAPS, Take 1 capsule by mouth every 24 hours, Disp: , Rfl:     cyanocobalamin (VITAMIN B-12) 1000 MCG tablet, Take by mouth daily, Disp: , Rfl:     docusate sodium (COLACE) 100 MG capsule, Take 1 capsule (100 mg) by mouth 2 times daily, Disp: 60 capsule,  Rfl: 1    esomeprazole (NEXIUM) 20 MG capsule, Take 1 capsule (20 mg) by mouth every morning (before breakfast) Take 30-60 minutes before eating., Disp: 30 capsule, Rfl: 0    Evening Primrose Oil 1000 MG CAPS, , Disp: , Rfl:     fluticasone (FLONASE) 50 MCG/ACT nasal spray, SPRAY 2 SPRAYS INTO EACH   NOSTRIL DAILY., Disp: 48 g, Rfl: 9    lisinopril (ZESTRIL) 30 MG tablet, TAKE 1 TABLET BY MOUTH EVERY DAY, Disp: 90 tablet, Rfl: 2    metroNIDAZOLE (FLAGYL) 500 MG tablet, Take by mouth every 8 hours, Disp: , Rfl:     Misc Natural Products (OSTEO BI-FLEX ADV JOINT SHIELD) TABS, , Disp: , Rfl:     multivitamin w/minerals (MULTI-VITAMIN) tablet, Take 1 tablet by mouth daily, Disp: , Rfl:     nitroFURantoin macrocrystal-monohydrate (MACROBID) 100 MG capsule, Use within two hours after intercourse for UTI prevention., Disp: 30 capsule, Rfl: 3    Omega-3 Fish Oil 500 MG capsule, Take 1 capsule (500 mg) by mouth daily, Disp: , Rfl:     simvastatin (ZOCOR) 40 MG tablet, Take 1 tablet (40 mg) by mouth At Bedtime, Disp: 90 tablet, Rfl: 3    VENTOLIN  (90 Base) MCG/ACT inhaler, INHALE 2 PUFFS INTO THE LUNGS EVERY 6 HOURS, Disp: 18 g, Rfl: 1    vitamin D3 (CHOLECALCIFEROL) 50 mcg (2000 units) tablet, Take 1 tablet (50 mcg) by mouth daily, Disp: , Rfl:     doxycycline hyclate (VIBRA-TABS) 100 MG tablet, Take 1 tablet (100 mg) by mouth 2 times daily for 10 days, Disp: 20 tablet, Rfl: 0      Respiratory History    occasional episodes of bronchitis      SUBJECTIVE    HPI: Breonna Caruso is an 68 year old female who presents with chest congestion, cough nonproductive, occasional, and nasal discharge greenish and yellowish.  Symptoms began 1  months ago and has gradually worsening.  There is no shortness of breath, wheezing, and chest pain.  Patient is eating and drinking well.  No fever, nausea, vomiting, or diarrhea.    Patient denies any recent travel or exposure to known COVID positive tested individual.      ROS:      Review of Systems   Constitutional:  Negative for chills and fever.   HENT:  Positive for congestion and rhinorrhea. Negative for ear pain and sore throat.    Eyes: Negative.    Respiratory:  Positive for cough. Negative for shortness of breath.    Cardiovascular: Negative.  Negative for chest pain and palpitations.   Gastrointestinal:  Negative for diarrhea, nausea and vomiting.   Endocrine: Negative.    Genitourinary: Negative.    Musculoskeletal: Negative.  Negative for arthralgias, back pain, joint swelling, myalgias, neck pain and neck stiffness.   Skin: Negative.  Negative for rash and wound.   Allergic/Immunologic: Negative.  Negative for immunocompromised state.   Neurological:  Negative for dizziness, weakness, light-headedness and headaches.         Family History   Family History   Problem Relation Age of Onset    Lipids Mother     Heart Disease Mother         pacemaker    Thyroid Disease Mother     Heart Disease Father         heart attacks-multiple ones    Lipids Father         diabetes after stroke     Cerebrovascular Disease Father     Prostate Cancer Father     Heart Disease Brother         3 total, one wtih open heart surgery at age of 55    Diabetes Brother         one brother with dm, other one with glucose intolerance    Diabetes Brother     Breast Cancer No family hx of     Cancer - colorectal No family hx of     Colon Cancer No family hx of         Problem history  Patient Active Problem List   Diagnosis    GERD (gastroesophageal reflux disease)    Mild persistent asthma    Hyperlipidemia LDL goal <160    Migraine headache    Family history of coronary artery disease    Decreased libido    HTN, goal below 140/90    Major depressive disorder, recurrent episode, mild (HCC)    Recurrent UTI    Vertigo    Seasonal allergic rhinitis due to pollen    Multiple drug allergies        Allergies  Allergies   Allergen Reactions    Cefdinir Shortness Of Breath, Itching and Diarrhea     Itching, nausea,  diarrhea and difficulty breathing    Ciprofloxacin Swelling     Facial swelling    Codeine Sulfate      rash    Lorcet [Hydrocodone-Acetaminophen]     Penicillin G      Rash, hives    Sulfa Antibiotics      Rash, hives        Social History  Social History     Socioeconomic History    Marital status:      Spouse name: Not on file    Number of children: Not on file    Years of education: Not on file    Highest education level: Not on file   Occupational History    Not on file   Tobacco Use    Smoking status: Never    Smokeless tobacco: Never   Vaping Use    Vaping Use: Never used   Substance and Sexual Activity    Alcohol use: Yes     Comment: some wine/beer    Drug use: No    Sexual activity: Yes     Partners: Male     Birth control/protection: Surgical     Comment: complete hysterectomy   Other Topics Concern    Parent/sibling w/ CABG, MI or angioplasty before 65F 55M? No   Social History Narrative     since 2007-going well    Working FT,HR and administrative work    32 yo son and 28 yo daughter    2 grandkids    2 dogs    Mother lives with pt since 2009     Social Determinants of Health     Financial Resource Strain: Low Risk  (11/15/2022)    Overall Financial Resource Strain (CARDIA)     Difficulty of Paying Living Expenses: Not hard at all   Food Insecurity: No Food Insecurity (11/15/2022)    Hunger Vital Sign     Worried About Running Out of Food in the Last Year: Never true     Ran Out of Food in the Last Year: Never true   Transportation Needs: No Transportation Needs (11/15/2022)    PRAPARE - Transportation     Lack of Transportation (Medical): No     Lack of Transportation (Non-Medical): No   Physical Activity: Inactive (11/15/2022)    Exercise Vital Sign     Days of Exercise per Week: 0 days     Minutes of Exercise per Session: 0 min   Stress: No Stress Concern Present (11/15/2022)    [employer] - Occupational Stress Questionnaire     Feeling of Stress : Only a  little   Social Connections: Moderately Isolated (11/15/2022)    Social Connection and Isolation Panel [NHANES]     Frequency of Communication with Friends and Family: More than three times a week     Frequency of Social Gatherings with Friends and Family: Never     Attends Religion Services: Never     Active Member of Clubs or Organizations: No     Attends Club or Organization Meetings: Not on file     Marital Status:    Intimate Partner Violence: Not on file   Housing Stability: Low Risk  (11/15/2022)    Housing Stability Vital Sign     Unable to Pay for Housing in the Last Year: No     Number of Places Lived in the Last Year: 1     Unstable Housing in the Last Year: No        OBJECTIVE     Vital signs reviewed by Wili Kendrick PA-C  /77   Pulse 68   Temp 97.4  F (36.3  C) (Tympanic)   Wt 65.5 kg (144 lb 4.8 oz)   LMP  (LMP Unknown)   SpO2 95%   BMI 24.01 kg/m       Physical Exam  Vitals and nursing note reviewed.   Constitutional:       General: She is not in acute distress.     Appearance: She is well-developed. She is not ill-appearing, toxic-appearing or diaphoretic.   HENT:      Head: Normocephalic and atraumatic.      Right Ear: Hearing, tympanic membrane, ear canal and external ear normal. Tympanic membrane is not perforated, erythematous, retracted or bulging.      Left Ear: Hearing, tympanic membrane, ear canal and external ear normal. Tympanic membrane is not perforated, erythematous, retracted or bulging.      Nose: Congestion present. No mucosal edema or rhinorrhea.      Right Sinus: No maxillary sinus tenderness or frontal sinus tenderness.      Left Sinus: No maxillary sinus tenderness or frontal sinus tenderness.      Mouth/Throat:      Pharynx: Posterior oropharyngeal erythema present. No pharyngeal swelling, oropharyngeal exudate or uvula swelling.      Tonsils: No tonsillar exudate or tonsillar abscesses. 0 on the right. 0 on the left.   Eyes:      General:         Right  eye: No discharge.         Left eye: No discharge.      Pupils: Pupils are equal, round, and reactive to light.   Cardiovascular:      Rate and Rhythm: Normal rate and regular rhythm.      Heart sounds: Normal heart sounds. No murmur heard.     No friction rub. No gallop.   Pulmonary:      Effort: Pulmonary effort is normal. No respiratory distress.      Breath sounds: Normal breath sounds. No decreased breath sounds, wheezing, rhonchi or rales.   Chest:      Chest wall: No tenderness.   Abdominal:      General: Bowel sounds are normal. There is no distension.      Palpations: Abdomen is soft. There is no mass.      Tenderness: There is no abdominal tenderness. There is no guarding.   Musculoskeletal:      Cervical back: Normal range of motion and neck supple.   Lymphadenopathy:      Head:      Right side of head: No submental, submandibular, tonsillar, preauricular or posterior auricular adenopathy.      Left side of head: No submental, submandibular, tonsillar, preauricular or posterior auricular adenopathy.      Cervical: No cervical adenopathy.      Right cervical: No superficial or posterior cervical adenopathy.     Left cervical: No superficial or posterior cervical adenopathy.   Skin:     General: Skin is warm and dry.      Findings: No rash.   Neurological:      Mental Status: She is alert and oriented to person, place, and time.      Cranial Nerves: No cranial nerve deficit.      Deep Tendon Reflexes: Reflexes are normal and symmetric.   Psychiatric:         Behavior: Behavior normal. Behavior is cooperative.         Thought Content: Thought content normal.         Judgment: Judgment normal.           Wili Kendrick PA-C  9/15/2023, 10:02 AM

## 2023-09-20 ENCOUNTER — MYC MEDICAL ADVICE (OUTPATIENT)
Dept: FAMILY MEDICINE | Facility: CLINIC | Age: 68
End: 2023-09-20

## 2023-09-20 ENCOUNTER — TELEPHONE (OUTPATIENT)
Dept: FAMILY MEDICINE | Facility: CLINIC | Age: 68
End: 2023-09-20

## 2023-09-20 ENCOUNTER — OFFICE VISIT (OUTPATIENT)
Dept: FAMILY MEDICINE | Facility: CLINIC | Age: 68
End: 2023-09-20
Payer: COMMERCIAL

## 2023-09-20 VITALS
HEART RATE: 68 BPM | RESPIRATION RATE: 14 BRPM | HEIGHT: 65 IN | DIASTOLIC BLOOD PRESSURE: 84 MMHG | BODY MASS INDEX: 24.19 KG/M2 | TEMPERATURE: 98.4 F | OXYGEN SATURATION: 95 % | WEIGHT: 145.2 LBS | SYSTOLIC BLOOD PRESSURE: 136 MMHG

## 2023-09-20 DIAGNOSIS — Z87.898 H/O MOTION SICKNESS: ICD-10-CM

## 2023-09-20 DIAGNOSIS — E78.5 HYPERLIPIDEMIA LDL GOAL <160: ICD-10-CM

## 2023-09-20 DIAGNOSIS — Z00.00 ENCOUNTER FOR MEDICARE ANNUAL WELLNESS EXAM: Primary | ICD-10-CM

## 2023-09-20 DIAGNOSIS — F33.0 MAJOR DEPRESSIVE DISORDER, RECURRENT EPISODE, MILD (H): ICD-10-CM

## 2023-09-20 DIAGNOSIS — I10 ESSENTIAL HYPERTENSION: ICD-10-CM

## 2023-09-20 LAB
ALBUMIN SERPL BCG-MCNC: 4.6 G/DL (ref 3.5–5.2)
ALP SERPL-CCNC: 58 U/L (ref 35–104)
ALT SERPL W P-5'-P-CCNC: 19 U/L (ref 0–50)
ANION GAP SERPL CALCULATED.3IONS-SCNC: 10 MMOL/L (ref 7–15)
AST SERPL W P-5'-P-CCNC: 23 U/L (ref 0–45)
BILIRUB SERPL-MCNC: 0.3 MG/DL
BUN SERPL-MCNC: 17.2 MG/DL (ref 8–23)
CALCIUM SERPL-MCNC: 9.6 MG/DL (ref 8.8–10.2)
CHLORIDE SERPL-SCNC: 106 MMOL/L (ref 98–107)
CHOLEST SERPL-MCNC: 198 MG/DL
CREAT SERPL-MCNC: 0.67 MG/DL (ref 0.51–0.95)
DEPRECATED HCO3 PLAS-SCNC: 28 MMOL/L (ref 22–29)
EGFRCR SERPLBLD CKD-EPI 2021: >90 ML/MIN/1.73M2
ERYTHROCYTE [DISTWIDTH] IN BLOOD BY AUTOMATED COUNT: 12.8 % (ref 10–15)
GLUCOSE SERPL-MCNC: 90 MG/DL (ref 70–99)
HCT VFR BLD AUTO: 42.6 % (ref 35–47)
HDLC SERPL-MCNC: 63 MG/DL
HGB BLD-MCNC: 13.9 G/DL (ref 11.7–15.7)
LDLC SERPL CALC-MCNC: 116 MG/DL
MCH RBC QN AUTO: 31.3 PG (ref 26.5–33)
MCHC RBC AUTO-ENTMCNC: 32.6 G/DL (ref 31.5–36.5)
MCV RBC AUTO: 96 FL (ref 78–100)
NONHDLC SERPL-MCNC: 135 MG/DL
PLATELET # BLD AUTO: 210 10E3/UL (ref 150–450)
POTASSIUM SERPL-SCNC: 4.1 MMOL/L (ref 3.4–5.3)
PROT SERPL-MCNC: 7.1 G/DL (ref 6.4–8.3)
RBC # BLD AUTO: 4.44 10E6/UL (ref 3.8–5.2)
SODIUM SERPL-SCNC: 144 MMOL/L (ref 136–145)
TRIGL SERPL-MCNC: 95 MG/DL
WBC # BLD AUTO: 4.8 10E3/UL (ref 4–11)

## 2023-09-20 PROCEDURE — 80053 COMPREHEN METABOLIC PANEL: CPT | Performed by: PHYSICIAN ASSISTANT

## 2023-09-20 PROCEDURE — 80061 LIPID PANEL: CPT | Performed by: PHYSICIAN ASSISTANT

## 2023-09-20 PROCEDURE — G0439 PPPS, SUBSEQ VISIT: HCPCS | Performed by: PHYSICIAN ASSISTANT

## 2023-09-20 PROCEDURE — 99214 OFFICE O/P EST MOD 30 MIN: CPT | Mod: 25 | Performed by: PHYSICIAN ASSISTANT

## 2023-09-20 PROCEDURE — 36415 COLL VENOUS BLD VENIPUNCTURE: CPT | Performed by: PHYSICIAN ASSISTANT

## 2023-09-20 PROCEDURE — 85027 COMPLETE CBC AUTOMATED: CPT | Performed by: PHYSICIAN ASSISTANT

## 2023-09-20 RX ORDER — SCOLOPAMINE TRANSDERMAL SYSTEM 1 MG/1
1 PATCH, EXTENDED RELEASE TRANSDERMAL
Qty: 10 PATCH | Refills: 0 | Status: SHIPPED | OUTPATIENT
Start: 2023-09-20 | End: 2023-10-03

## 2023-09-20 RX ORDER — SIMVASTATIN 40 MG
40 TABLET ORAL AT BEDTIME
Qty: 90 TABLET | Refills: 3 | Status: SHIPPED | OUTPATIENT
Start: 2023-09-20 | End: 2024-08-20

## 2023-09-20 RX ORDER — BUPROPION HYDROCHLORIDE 300 MG/1
300 TABLET ORAL EVERY MORNING
Qty: 90 TABLET | Refills: 3 | Status: SHIPPED | OUTPATIENT
Start: 2023-09-20 | End: 2024-08-20

## 2023-09-20 RX ORDER — LISINOPRIL 30 MG/1
30 TABLET ORAL DAILY
Qty: 90 TABLET | Refills: 2 | Status: SHIPPED | OUTPATIENT
Start: 2023-09-20 | End: 2024-09-16 | Stop reason: SINTOL

## 2023-09-20 SDOH — HEALTH STABILITY: PHYSICAL HEALTH: ON AVERAGE, HOW MANY DAYS PER WEEK DO YOU ENGAGE IN MODERATE TO STRENUOUS EXERCISE (LIKE A BRISK WALK)?: 2 DAYS

## 2023-09-20 ASSESSMENT — ENCOUNTER SYMPTOMS
CONSTIPATION: 0
HEADACHES: 0
DIARRHEA: 0
PARESTHESIAS: 0
WEAKNESS: 0
HEARTBURN: 0
DIZZINESS: 0
NAUSEA: 0
EYE PAIN: 0
BREAST MASS: 0
CHILLS: 0
FREQUENCY: 0
FEVER: 0
ABDOMINAL PAIN: 0
SORE THROAT: 0
HEMATOCHEZIA: 0
ARTHRALGIAS: 0
COUGH: 0
MYALGIAS: 0
SHORTNESS OF BREATH: 0
PALPITATIONS: 0
JOINT SWELLING: 0
DYSURIA: 0
NERVOUS/ANXIOUS: 0
HEMATURIA: 0

## 2023-09-20 ASSESSMENT — LIFESTYLE VARIABLES
HOW OFTEN DO YOU HAVE SIX OR MORE DRINKS ON ONE OCCASION: LESS THAN MONTHLY
AUDIT-C TOTAL SCORE: 3
HOW MANY STANDARD DRINKS CONTAINING ALCOHOL DO YOU HAVE ON A TYPICAL DAY: 1 OR 2
HOW OFTEN DO YOU HAVE A DRINK CONTAINING ALCOHOL: 2-4 TIMES A MONTH
SKIP TO QUESTIONS 9-10: 0

## 2023-09-20 ASSESSMENT — SOCIAL DETERMINANTS OF HEALTH (SDOH)
HOW OFTEN DO YOU ATTEND CHURCH OR RELIGIOUS SERVICES?: PATIENT DECLINED
HOW OFTEN DO YOU GET TOGETHER WITH FRIENDS OR RELATIVES?: ONCE A WEEK
HOW OFTEN DO YOU ATTENT MEETINGS OF THE CLUB OR ORGANIZATION YOU BELONG TO?: 1 TO 4 TIMES PER YEAR
IN A TYPICAL WEEK, HOW MANY TIMES DO YOU TALK ON THE PHONE WITH FAMILY, FRIENDS, OR NEIGHBORS?: MORE THAN THREE TIMES A WEEK
DO YOU BELONG TO ANY CLUBS OR ORGANIZATIONS SUCH AS CHURCH GROUPS UNIONS, FRATERNAL OR ATHLETIC GROUPS, OR SCHOOL GROUPS?: NO

## 2023-09-20 ASSESSMENT — ACTIVITIES OF DAILY LIVING (ADL): CURRENT_FUNCTION: NO ASSISTANCE NEEDED

## 2023-09-20 ASSESSMENT — PATIENT HEALTH QUESTIONNAIRE - PHQ9
SUM OF ALL RESPONSES TO PHQ QUESTIONS 1-9: 3
SUM OF ALL RESPONSES TO PHQ QUESTIONS 1-9: 3
10. IF YOU CHECKED OFF ANY PROBLEMS, HOW DIFFICULT HAVE THESE PROBLEMS MADE IT FOR YOU TO DO YOUR WORK, TAKE CARE OF THINGS AT HOME, OR GET ALONG WITH OTHER PEOPLE: NOT DIFFICULT AT ALL

## 2023-09-20 NOTE — TELEPHONE ENCOUNTER
Called patient and informed her. Patient continues to declined cream, as she hates them. She thanked writer.

## 2023-09-20 NOTE — PROGRESS NOTES
"SUBJECTIVE:   Tomeka is a 68 year old who presents for Preventive Visit.      Are you in the first 12 months of your Medicare coverage?  No    Healthy Habits:     In general, how would you rate your overall health?  Good    Frequency of exercise:  2-3 days/week    Duration of exercise:  15-30 minutes    Do you usually eat at least 4 servings of fruit and vegetables a day, include whole grains    & fiber and avoid regularly eating high fat or \"junk\" foods?  Yes    Taking medications regularly:  Yes    Medication side effects:  None    Ability to successfully perform activities of daily living:  No assistance needed    Home Safety:  No safety concerns identified    Hearing Impairment:  No hearing concerns    In the past 6 months, have you been bothered by leaking of urine?  No    In general, how would you rate your overall mental or emotional health?  Good    Additional concerns today:  Yes          Have you ever done Advance Care Planning? (For example, a Health Directive, POLST, or a discussion with a medical provider or your loved ones about your wishes): Yes, advance care planning is on file.     Fall risk  Fallen 2 or more times in the past year?: No  Any fall with injury in the past year?: No    Cognitive Screening   1) Repeat 3 items (Leader, Season, Table)    2) Clock draw: NORMAL  3) 3 item recall: Recalls 3 objects  Results: 3 items recalled: COGNITIVE IMPAIRMENT LESS LIKELY    Mini-CogTM Copyright GERARDO Rosario. Licensed by the author for use in [facility]; reprinted with permission (deward@.[facility]). All rights reserved.      Reviewed and updated as needed this visit by clinical staff   Tobacco  Allergies  Meds              Reviewed and updated as needed this visit by Provider                 Social History     Tobacco Use     Smoking status: Never     Smokeless tobacco: Never   Substance Use Topics     Alcohol use: Yes     Comment: some wine/beer           9/13/2023     4:40 PM   Alcohol Use "   Prescreen: >3 drinks/day or >7 drinks/week? No     Do you have a current opioid prescription? No  Do you use any other controlled substances or medications that are not prescribed by a provider? None              Current providers sharing in care for this patient include:   Patient Care Team:  No Ref-Primary, Physician as PCP - General  Aaseby-Aguilera, Ramona Ann, PA-C as Assigned PCP    The following health maintenance items are reviewed in Epic and correct as of today:  Health Maintenance   Topic Date Due     ZOSTER IMMUNIZATION (2 of 2) 12/07/2020     ASTHMA ACTION PLAN  10/12/2021     DEXA  09/25/2022     COVID-19 Vaccine (5 - Pfizer risk series) 01/10/2023     INFLUENZA VACCINE (1) 09/01/2023     MEDICARE ANNUAL WELLNESS VISIT  11/15/2023     ANNUAL REVIEW OF HM ORDERS  11/15/2023     ASTHMA CONTROL TEST  03/20/2024     PHQ-9  03/20/2024     FALL RISK ASSESSMENT  09/20/2024     MAMMO SCREENING  11/16/2024     LIPID  11/15/2027     COLORECTAL CANCER SCREENING  08/16/2028     ADVANCE CARE PLANNING  09/20/2028     DTAP/TDAP/TD IMMUNIZATION (3 - Td or Tdap) 11/15/2032     HEPATITIS C SCREENING  Completed     DEPRESSION ACTION PLAN  Completed     MIGRAINE ACTION PLAN  Completed     Pneumococcal Vaccine: 65+ Years  Completed     IPV IMMUNIZATION  Aged Out     HPV IMMUNIZATION  Aged Out     MENINGITIS IMMUNIZATION  Aged Out     BP Readings from Last 3 Encounters:   09/20/23 136/84   09/15/23 125/77   11/15/22 128/84    Wt Readings from Last 3 Encounters:   09/20/23 65.9 kg (145 lb 3.2 oz)   09/15/23 65.5 kg (144 lb 4.8 oz)   11/15/22 66.2 kg (146 lb)                  Patient Active Problem List   Diagnosis     GERD (gastroesophageal reflux disease)     Mild persistent asthma     Hyperlipidemia LDL goal <160     Migraine headache     Family history of coronary artery disease     Decreased libido     HTN, goal below 140/90     Major depressive disorder, recurrent episode, mild (HCC)     Recurrent UTI     Vertigo      Seasonal allergic rhinitis due to pollen     Multiple drug allergies     Past Surgical History:   Procedure Laterality Date     BIOPSY  2009    left breast     COLONOSCOPY N/A 7/10/2015    Procedure: COLONOSCOPY;  Surgeon: Saul Aguilar MD;  Location: [facility]     HYSTERECTOMY VAGINAL  1994    endometriosis     REPAIR PTOSIS BILATERAL  1/4/2013    Procedure: REPAIR PTOSIS BILATERAL;  BILATERAL UPPER LID MECHANICAL PTOSIS REPAIR;  Surgeon: Derek Allred MD;  Location:  [facility]     SLING BLADDER SUSPENSION WITH FASCIA KAYLYN  2004    bladder sling      SURGICAL HISTORY OF -   2003    rt shoulder surgery        Social History     Tobacco Use     Smoking status: Never     Smokeless tobacco: Never   Substance Use Topics     Alcohol use: Yes     Comment: some wine/beer     Family History   Problem Relation Age of Onset     Lipids Mother      Heart Disease Mother         pacemaker     Thyroid Disease Mother      Coronary Artery Disease Mother      Heart Disease Father         heart attacks-multiple ones     Lipids Father         diabetes after stroke      Cerebrovascular Disease Father      Prostate Cancer Father      Heart Disease Brother         3 total, one wtih open heart surgery at age of 55     Diabetes Brother         one brother with dm, other one with glucose intolerance     Coronary Artery Disease Brother      Diabetes Brother      Thyroid Disease Daughter      Breast Cancer No family hx of      Cancer - colorectal No family hx of      Colon Cancer No family hx of          Current Outpatient Medications   Medication Sig Dispense Refill     buPROPion (WELLBUTRIN XL) 300 MG 24 hr tablet Take 1 tablet (300 mg) by mouth every morning 90 tablet 3     cyanocobalamin (VITAMIN B-12) 1000 MCG tablet Take by mouth daily       docusate sodium (COLACE) 100 MG capsule Take 1 capsule (100 mg) by mouth 2 times daily 60 capsule 1     doxycycline hyclate (VIBRA-TABS) 100 MG tablet Take 1 tablet (100 mg) by mouth 2 times  daily for 10 days 20 tablet 0     esomeprazole (NEXIUM) 20 MG capsule Take 1 capsule (20 mg) by mouth every morning (before breakfast) Take 30-60 minutes before eating. 30 capsule 0     Evening Primrose Oil 1000 MG CAPS        fluticasone (FLONASE) 50 MCG/ACT nasal spray SPRAY 2 SPRAYS INTO EACH   NOSTRIL DAILY. 48 g 9     lisinopril (ZESTRIL) 30 MG tablet Take 1 tablet (30 mg) by mouth daily 90 tablet 2     Misc Natural Products (OSTEO BI-FLEX ADV JOINT SHIELD) TABS        multivitamin w/minerals (MULTI-VITAMIN) tablet Take 1 tablet by mouth daily       Omega-3 Fish Oil 500 MG capsule Take 1 capsule (500 mg) by mouth daily       scopolamine (TRANSDERM) 1 MG/3DAYS 72 hr patch Place 1 patch onto the skin every 72 hours 10 patch 0     simvastatin (ZOCOR) 40 MG tablet Take 1 tablet (40 mg) by mouth At Bedtime 90 tablet 3     vitamin D3 (CHOLECALCIFEROL) 50 mcg (2000 units) tablet Take 1 tablet (50 mcg) by mouth daily       Allergies   Allergen Reactions     Cefdinir Shortness Of Breath, Itching and Diarrhea     Itching, nausea, diarrhea and difficulty breathing     Codeine Sulfate      rash     Lorcet [Hydrocodone-Acetaminophen]      Penicillin G      Rash, hives     Sulfa Antibiotics      Rash, hives             11/15/2022    12:00 PM 11/16/2022    10:46 AM   Breast CA Risk Assessment (FHS-7)   Do you have a family history of breast, colon, or ovarian cancer? No / Unknown No / Unknown         Mammogram Screening: Recommended mammography every 1-2 years with patient discussion and risk factor consideration  Pertinent mammograms are reviewed under the imaging tab.    Review of Systems   Constitutional:  Negative for chills and fever.   HENT:  Positive for congestion. Negative for ear pain, hearing loss and sore throat.    Eyes:  Negative for pain and visual disturbance.   Respiratory:  Negative for cough and shortness of breath.    Cardiovascular:  Negative for chest pain, palpitations and peripheral edema.  "  Gastrointestinal:  Negative for abdominal pain, constipation, diarrhea, heartburn, hematochezia and nausea.   Breasts:  Negative for tenderness, breast mass and discharge.   Genitourinary:  Negative for dysuria, frequency, genital sores, hematuria, pelvic pain, urgency, vaginal bleeding and vaginal discharge.   Musculoskeletal:  Negative for arthralgias, joint swelling and myalgias.   Skin:  Negative for rash.   Neurological:  Negative for dizziness, weakness, headaches and paresthesias.   Psychiatric/Behavioral:  Negative for mood changes. The patient is not nervous/anxious.          OBJECTIVE:   /84 (BP Location: Right arm, Patient Position: Sitting, Cuff Size: Adult Regular)   Pulse 68   Temp 98.4  F (36.9  C) (Oral)   Resp 14   Ht 1.638 m (5' 4.5\")   Wt 65.9 kg (145 lb 3.2 oz)   LMP  (LMP Unknown)   SpO2 95%   BMI 24.54 kg/m   Estimated body mass index is 24.54 kg/m  as calculated from the following:    Height as of this encounter: 1.638 m (5' 4.5\").    Weight as of this encounter: 65.9 kg (145 lb 3.2 oz).  Physical Exam  GENERAL APPEARANCE: healthy, alert and no distress  EYES: Eyes grossly normal to inspection, PERRL and conjunctivae and sclerae normal  HENT: ear canals and TM's normal, nose and mouth without ulcers or lesions, oropharynx clear and oral mucous membranes moist  NECK: no adenopathy, no asymmetry, masses, or scars and thyroid normal to palpation  RESP: lungs clear to auscultation - no rales, rhonchi or wheezes  BREAST: normal without masses, tenderness or nipple discharge and no palpable axillary masses or adenopathy  CV: regular rate and rhythm, normal S1 S2, no S3 or S4, no murmur, click or rub, no peripheral edema and peripheral pulses strong  ABDOMEN: soft, nontender, no hepatosplenomegaly, no masses and bowel sounds normal  MS: no musculoskeletal defects are noted and gait is age appropriate without ataxia  SKIN: no suspicious lesions or rashes  NEURO: Normal strength and " tone, sensory exam grossly normal, mentation intact and speech normal  PSYCH: mentation appears normal and affect normal/bright    Diagnostic Test Results:  Labs reviewed in Epic    ASSESSMENT / PLAN:   (Z00.00) Encounter for Medicare annual wellness exam  (primary encounter diagnosis)  Comment:   Plan:     (F33.0) Major depressive disorder, recurrent episode, mild (HCC)  Comment: stable and works well  Plan: buPROPion (WELLBUTRIN XL) 300 MG 24 hr tablet            (E78.5) Hyperlipidemia LDL goal <160  Comment:   Plan: simvastatin (ZOCOR) 40 MG tablet            (I10) Essential hypertension  Comment: stable   Plan: lisinopril (ZESTRIL) 30 MG tablet, Lipid         Profile, Comprehensive metabolic panel, CBC         with platelets            (Z87.898) H/O motion sickness  Comment: upcoming cruise.   Plan: scopolamine (TRANSDERM) 1 MG/3DAYS 72 hr patch                  COUNSELING:  Reviewed preventive health counseling, as reflected in patient instructions       Regular exercise       Healthy diet/nutrition       Vision screening       Hearing screening        She reports that she has never smoked. She has never used smokeless tobacco.      Appropriate preventive services were discussed with this patient, including applicable screening as appropriate for cardiovascular disease, diabetes, osteopenia/osteoporosis, and glaucoma.  As appropriate for age/gender, discussed screening for colorectal cancer, prostate cancer, breast cancer, and cervical cancer. Checklist reviewing preventive services available has been given to the patient.    Reviewed patients plan of care and provided an AVS. The Basic Care Plan (routine screening as documented in Health Maintenance) for Breonna meets the Care Plan requirement. This Care Plan has been established and reviewed with the Patient.          Ramona Ann Aaseby-Aguilera, PA-C  [facility]    Identified Health Risks:  Answers submitted by the patient for this  visit:  Patient Health Questionnaire (Submitted on 9/20/2023)  If you checked off any problems, how difficult have these problems made it for you to do your work, take care of things at home, or get along with other people?: Not difficult at all  PHQ9 TOTAL SCORE: 3

## 2023-09-20 NOTE — PATIENT INSTRUCTIONS
Patient Education   Personalized Prevention Plan  You are due for the preventive services outlined below.  Your care team is available to assist you in scheduling these services.  If you have already completed any of these items, please share that information with your care team to update in your medical record.  Health Maintenance Due   Topic Date Due     Zoster (Shingles) Vaccine (2 of 2) 12/07/2020     Asthma Action Plan - yearly  10/12/2021     Osteoporosis Screening  09/25/2022     COVID-19 Vaccine (5 - Pfizer risk series) 01/10/2023     Flu Vaccine (1) 09/01/2023        Patient Education   Personalized Prevention Plan  You are due for the preventive services outlined below.  Your care team is available to assist you in scheduling these services.  If you have already completed any of these items, please share that information with your care team to update in your medical record.  Health Maintenance Due   Topic Date Due     Zoster (Shingles) Vaccine (2 of 2) 12/07/2020     Asthma Action Plan - yearly  10/12/2021     Osteoporosis Screening  09/25/2022     COVID-19 Vaccine (5 - Pfizer risk series) 01/10/2023     Flu Vaccine (1) 09/01/2023        Patient Education   Personalized Prevention Plan  You are due for the preventive services outlined below.  Your care team is available to assist you in scheduling these services.  If you have already completed any of these items, please share that information with your care team to update in your medical record.  Health Maintenance Due   Topic Date Due     Zoster (Shingles) Vaccine (2 of 2) 12/07/2020     Asthma Action Plan - yearly  10/12/2021     Osteoporosis Screening  09/25/2022     COVID-19 Vaccine (5 - Pfizer risk series) 01/10/2023     Flu Vaccine (1) 09/01/2023

## 2023-09-20 NOTE — TELEPHONE ENCOUNTER
scopolamine (TRANSDERM) 1 MG/3DAYS 72 hr patch needs PA. Pharmacy s/w [facility] who verified this. Rebekah Polk R.N.      Interface, Eprescribing  P Lv Refill  An error occurred while processing the e-prescribing message.    The notification of a prior authorization refusal could not be sent electronically to the requesting pharmacy. Call to notify the pharmacy.    Code: 900 - Transaction rejected  Description Code: Code: 220 - Transaction is a duplicate  Partner rejected transaction with an error

## 2023-09-20 NOTE — TELEPHONE ENCOUNTER
Patient stopped at desk after appt and didn't see that an estrogen pill was ordered and wondering if Lula was going to put in a prescription for one.

## 2023-09-20 NOTE — TELEPHONE ENCOUNTER
No she can't be on a tablet of patch form of estrogen due to age.  We discuss a vaginal estrogen cream but she declined.

## 2023-10-02 NOTE — TELEPHONE ENCOUNTER
Mary Vieira  You12 days ago     LH  Hello-  Per EPA notes, brand name is preferred by patient's insurance.  Please close encounter when finished.  Thank you,  Mary

## 2023-10-03 RX ORDER — SCOLOPAMINE TRANSDERMAL SYSTEM 1 MG/1
1 PATCH, EXTENDED RELEASE TRANSDERMAL
Qty: 10 PATCH | Refills: 0 | Status: SHIPPED | OUTPATIENT
Start: 2023-10-03 | End: 2024-06-07

## 2023-12-06 ENCOUNTER — ANCILLARY PROCEDURE (OUTPATIENT)
Dept: MAMMOGRAPHY | Facility: CLINIC | Age: 68
End: 2023-12-06
Attending: PHYSICIAN ASSISTANT
Payer: COMMERCIAL

## 2023-12-06 DIAGNOSIS — Z12.31 VISIT FOR SCREENING MAMMOGRAM: ICD-10-CM

## 2023-12-06 PROCEDURE — 77067 SCR MAMMO BI INCL CAD: CPT | Mod: GC | Performed by: RADIOLOGY

## 2023-12-06 PROCEDURE — 77063 BREAST TOMOSYNTHESIS BI: CPT | Mod: GC | Performed by: RADIOLOGY

## 2024-06-07 ENCOUNTER — VIRTUAL VISIT (OUTPATIENT)
Dept: FAMILY MEDICINE | Facility: CLINIC | Age: 69
End: 2024-06-07
Payer: COMMERCIAL

## 2024-06-07 DIAGNOSIS — I10 ESSENTIAL HYPERTENSION: ICD-10-CM

## 2024-06-07 DIAGNOSIS — Z78.0 POST-MENOPAUSAL: ICD-10-CM

## 2024-06-07 DIAGNOSIS — J45.30 MILD PERSISTENT ASTHMA WITHOUT COMPLICATION: Primary | ICD-10-CM

## 2024-06-07 PROCEDURE — 99214 OFFICE O/P EST MOD 30 MIN: CPT | Mod: 95 | Performed by: PHYSICIAN ASSISTANT

## 2024-06-07 PROCEDURE — G2211 COMPLEX E/M VISIT ADD ON: HCPCS | Mod: 95 | Performed by: PHYSICIAN ASSISTANT

## 2024-06-07 RX ORDER — VALSARTAN 80 MG/1
80 TABLET ORAL DAILY
Qty: 90 TABLET | Refills: 1 | Status: SHIPPED | OUTPATIENT
Start: 2024-06-07 | End: 2024-09-17

## 2024-06-07 RX ORDER — ALBUTEROL SULFATE 90 UG/1
2 AEROSOL, METERED RESPIRATORY (INHALATION) EVERY 4 HOURS PRN
Qty: 18 G | Refills: 1 | Status: SHIPPED | OUTPATIENT
Start: 2024-06-07 | End: 2024-09-16

## 2024-06-07 ASSESSMENT — ASTHMA QUESTIONNAIRES
ACT_TOTALSCORE: 15
QUESTION_1 LAST FOUR WEEKS HOW MUCH OF THE TIME DID YOUR ASTHMA KEEP YOU FROM GETTING AS MUCH DONE AT WORK, SCHOOL OR AT HOME: NONE OF THE TIME
QUESTION_2 LAST FOUR WEEKS HOW OFTEN HAVE YOU HAD SHORTNESS OF BREATH: THREE TO SIX TIMES A WEEK
QUESTION_5 LAST FOUR WEEKS HOW WOULD YOU RATE YOUR ASTHMA CONTROL: SOMEWHAT CONTROLLED
QUESTION_4 LAST FOUR WEEKS HOW OFTEN HAVE YOU USED YOUR RESCUE INHALER OR NEBULIZER MEDICATION (SUCH AS ALBUTEROL): TWO OR THREE TIMES PER WEEK
QUESTION_3 LAST FOUR WEEKS HOW OFTEN DID YOUR ASTHMA SYMPTOMS (WHEEZING, COUGHING, SHORTNESS OF BREATH, CHEST TIGHTNESS OR PAIN) WAKE YOU UP AT NIGHT OR EARLIER THAN USUAL IN THE MORNING: FOUR OR MORE NIGHTS A WEEK
ACT_TOTALSCORE: 15

## 2024-06-07 ASSESSMENT — PATIENT HEALTH QUESTIONNAIRE - PHQ9
SUM OF ALL RESPONSES TO PHQ QUESTIONS 1-9: 0
10. IF YOU CHECKED OFF ANY PROBLEMS, HOW DIFFICULT HAVE THESE PROBLEMS MADE IT FOR YOU TO DO YOUR WORK, TAKE CARE OF THINGS AT HOME, OR GET ALONG WITH OTHER PEOPLE: NOT DIFFICULT AT ALL
SUM OF ALL RESPONSES TO PHQ QUESTIONS 1-9: 0

## 2024-06-07 NOTE — PROGRESS NOTES
Tomeka is a 69 year old who is being evaluated via a billable video visit.    How would you like to obtain your AVS? MyChart  If the video visit is dropped, the invitation should be resent by: Send to e-mail at: [email or web address]  Will anyone else be joining your video visit? No      Assessment & Plan     Mild persistent asthma without complication  Needs refills.  No concerns  - albuterol (PROAIR HFA/PROVENTIL HFA/VENTOLIN HFA) 108 (90 Base) MCG/ACT inhaler; Inhale 2 puffs into the lungs every 4 hours as needed for shortness of breath or wheezing  - beclomethasone HFA (QVAR REDIHALER) 80 MCG/ACT inhaler; Inhale 1 puff into the lungs 2 times daily    Essential hypertension  Will switch from lisinopril due to cough.  Will get a nurse only bp check in 2 weeks and follow-up this fall.   - valsartan (DIOVAN) 80 MG tablet; Take 1 tablet (80 mg) by mouth daily    Post-menopausal    - DEXA HIP/PELVIS/SPINE - Future; Future                Subjective   Tomeka is a 69 year old, presenting for the following health issues:  Hypertension (Discuss stopping Lisinopril, causes her to cough and no longer wants to deal with this. )        6/7/2024     8:26 AM   Additional Questions   Roomed by Collette Bliss CMA   Accompanied by Self     Video Start Time:  8:30    History of Present Illness       Reason for visit:  Lisinopril cough    She eats 0-1 servings of fruits and vegetables daily.She consumes 1 sweetened beverage(s) daily.She exercises with enough effort to increase her heart rate 9 or less minutes per day.  She exercises with enough effort to increase her heart rate 3 or less days per week.   She is taking medications regularly.       Medication Followup of Lisinopril  Taking Medication as prescribed: yes  Side Effects:  None  Medication Helping Symptoms:  yes          Review of Systems  CONSTITUTIONAL: NEGATIVE for fever, chills, change in weight  INTEGUMENTARY/SKIN: NEGATIVE for worrisome rashes, moles or  "lesions  EYES: NEGATIVE for vision changes or irritation  ENT/MOUTH: NEGATIVE for ear, mouth and throat problems  RESP: NEGATIVE for significant cough or SOB  BREAST: NEGATIVE for masses, tenderness or discharge  CV: NEGATIVE for chest pain, palpitations or peripheral edema  GI: NEGATIVE for nausea, abdominal pain, heartburn, or change in bowel habits  : NEGATIVE for frequency, dysuria, or hematuria  MUSCULOSKELETAL: NEGATIVE for significant arthralgias or myalgia  NEURO: NEGATIVE for weakness, dizziness or paresthesias  ENDOCRINE: NEGATIVE for temperature intolerance, skin/hair changes  HEME: NEGATIVE for bleeding problems  PSYCHIATRIC: NEGATIVE for changes in mood or affect      Objective    Vitals - Patient Reported  Weight (Patient Reported): 64.4 kg (142 lb)  Height (Patient Reported): 166.4 cm (5' 5.5\")  BMI (Based on Pt Reported Ht/Wt): 23.27  Pain Score: No Pain (0)        Physical Exam   GENERAL: alert and no distress  EYES: Eyes grossly normal to inspection.  No discharge or erythema, or obvious scleral/conjunctival abnormalities.  RESP: No audible wheeze, cough, or visible cyanosis.    SKIN: Visible skin clear. No significant rash, abnormal pigmentation or lesions.  NEURO: Cranial nerves grossly intact.  Mentation and speech appropriate for age.  PSYCH: Appropriate affect, tone, and pace of words          Video-Visit Details    Type of service:  Video Visit   Video End Time:8:49 AM  Originating Location (pt. Location): Home    Distant Location (provider location):  Off-site  Platform used for Video Visit: Eleni  Signed Electronically by: Ramona Ann Aaseby-Aguilera, PA-C    "

## 2024-06-11 ENCOUNTER — MYC REFILL (OUTPATIENT)
Dept: FAMILY MEDICINE | Facility: CLINIC | Age: 69
End: 2024-06-11
Payer: COMMERCIAL

## 2024-06-11 DIAGNOSIS — J45.30 MILD PERSISTENT ASTHMA WITHOUT COMPLICATION: ICD-10-CM

## 2024-07-17 ENCOUNTER — ALLIED HEALTH/NURSE VISIT (OUTPATIENT)
Dept: FAMILY MEDICINE | Facility: CLINIC | Age: 69
End: 2024-07-17
Payer: COMMERCIAL

## 2024-07-17 VITALS — SYSTOLIC BLOOD PRESSURE: 128 MMHG | DIASTOLIC BLOOD PRESSURE: 82 MMHG

## 2024-07-17 DIAGNOSIS — Z01.30 BLOOD PRESSURE CHECK: Primary | ICD-10-CM

## 2024-07-17 PROCEDURE — 99207 PR NO CHARGE NURSE ONLY: CPT

## 2024-07-17 NOTE — PROGRESS NOTES
I met with Breonna Caruso at the request of REGGIE Purcell to recheck her blood pressure.  Blood pressure medications on the med list were reviewed with patient.    Patient has taken all medications as per usual regimen: Yes  Patient reports tolerating them without any issues or concerns: Yes    Vitals:    07/17/24 0945   BP: 128/82   BP Location: Left arm   Patient Position: Sitting   Cuff Size: Adult Regular       Blood pressure was taken, previous encounter was reviewed, recorded blood pressure below 140/90.  Patient was discharged and the note will be sent to the provider for final review.

## 2024-08-17 ENCOUNTER — MYC MEDICAL ADVICE (OUTPATIENT)
Dept: FAMILY MEDICINE | Facility: CLINIC | Age: 69
End: 2024-08-17
Payer: COMMERCIAL

## 2024-08-17 DIAGNOSIS — J45.30 MILD PERSISTENT ASTHMA WITHOUT COMPLICATION: ICD-10-CM

## 2024-08-19 RX ORDER — ALBUTEROL SULFATE 90 UG/1
2 AEROSOL, METERED RESPIRATORY (INHALATION) EVERY 4 HOURS PRN
Qty: 18 G | Refills: 3 | Status: SHIPPED | OUTPATIENT
Start: 2024-08-19

## 2024-08-19 NOTE — TELEPHONE ENCOUNTER
HOLD FOR PCP     See my chart -  Please seen Arnitiy and discontinue Asmanex.      Delfina Tovar RN

## 2024-08-19 NOTE — ADDENDUM NOTE
Addended by: AASEBY-AGUILERA, RAMONA A on: 8/19/2024 02:27 PM     Modules accepted: Orders     Parent continues to be concerned about school attendance. Mother is thinking about home schooling patient at this point. NN informed parent that letter had been sent to school from PCP's office. Dr. Yudith Almaraz informed

## 2024-08-20 DIAGNOSIS — E78.5 HYPERLIPIDEMIA LDL GOAL <160: ICD-10-CM

## 2024-08-20 DIAGNOSIS — F33.0 MAJOR DEPRESSIVE DISORDER, RECURRENT EPISODE, MILD (H): ICD-10-CM

## 2024-08-20 RX ORDER — BUPROPION HYDROCHLORIDE 300 MG/1
300 TABLET ORAL EVERY MORNING
Qty: 90 TABLET | Refills: 3 | Status: SHIPPED | OUTPATIENT
Start: 2024-08-20

## 2024-08-20 RX ORDER — SIMVASTATIN 40 MG
40 TABLET ORAL AT BEDTIME
Qty: 90 TABLET | Refills: 3 | Status: SHIPPED | OUTPATIENT
Start: 2024-08-20 | End: 2024-09-18 | Stop reason: ALTCHOICE

## 2024-08-20 RX ORDER — ALBUTEROL SULFATE 90 UG/1
2 AEROSOL, METERED RESPIRATORY (INHALATION) EVERY 6 HOURS PRN
Qty: 18 G | Refills: 1 | OUTPATIENT
Start: 2024-08-20

## 2024-08-21 ENCOUNTER — PATIENT OUTREACH (OUTPATIENT)
Dept: CARE COORDINATION | Facility: CLINIC | Age: 69
End: 2024-08-21
Payer: COMMERCIAL

## 2024-09-15 ASSESSMENT — ASTHMA QUESTIONNAIRES
QUESTION_1 LAST FOUR WEEKS HOW MUCH OF THE TIME DID YOUR ASTHMA KEEP YOU FROM GETTING AS MUCH DONE AT WORK, SCHOOL OR AT HOME: NONE OF THE TIME
ACT_TOTALSCORE: 21
QUESTION_5 LAST FOUR WEEKS HOW WOULD YOU RATE YOUR ASTHMA CONTROL: WELL CONTROLLED
QUESTION_4 LAST FOUR WEEKS HOW OFTEN HAVE YOU USED YOUR RESCUE INHALER OR NEBULIZER MEDICATION (SUCH AS ALBUTEROL): TWO OR THREE TIMES PER WEEK
QUESTION_2 LAST FOUR WEEKS HOW OFTEN HAVE YOU HAD SHORTNESS OF BREATH: ONCE OR TWICE A WEEK
QUESTION_3 LAST FOUR WEEKS HOW OFTEN DID YOUR ASTHMA SYMPTOMS (WHEEZING, COUGHING, SHORTNESS OF BREATH, CHEST TIGHTNESS OR PAIN) WAKE YOU UP AT NIGHT OR EARLIER THAN USUAL IN THE MORNING: NOT AT ALL
ACT_TOTALSCORE: 21

## 2024-09-16 ENCOUNTER — OFFICE VISIT (OUTPATIENT)
Dept: FAMILY MEDICINE | Facility: CLINIC | Age: 69
End: 2024-09-16
Payer: COMMERCIAL

## 2024-09-16 VITALS
TEMPERATURE: 98.4 F | OXYGEN SATURATION: 94 % | HEART RATE: 75 BPM | DIASTOLIC BLOOD PRESSURE: 81 MMHG | RESPIRATION RATE: 12 BRPM | HEIGHT: 65 IN | SYSTOLIC BLOOD PRESSURE: 136 MMHG | WEIGHT: 147.5 LBS | BODY MASS INDEX: 24.57 KG/M2

## 2024-09-16 DIAGNOSIS — F33.0 MAJOR DEPRESSIVE DISORDER, RECURRENT EPISODE, MILD (H): ICD-10-CM

## 2024-09-16 DIAGNOSIS — Z23 NEED FOR PROPHYLACTIC VACCINATION AND INOCULATION AGAINST INFLUENZA: ICD-10-CM

## 2024-09-16 DIAGNOSIS — I10 HTN, GOAL BELOW 140/90: ICD-10-CM

## 2024-09-16 DIAGNOSIS — N95.2 ATROPHY OF VAGINA: ICD-10-CM

## 2024-09-16 DIAGNOSIS — E78.5 HYPERLIPIDEMIA LDL GOAL <160: ICD-10-CM

## 2024-09-16 DIAGNOSIS — J45.30 MILD PERSISTENT ASTHMA WITHOUT COMPLICATION: Primary | ICD-10-CM

## 2024-09-16 LAB
ALBUMIN SERPL BCG-MCNC: 4.4 G/DL (ref 3.5–5.2)
ALP SERPL-CCNC: 68 U/L (ref 40–150)
ALT SERPL W P-5'-P-CCNC: 34 U/L (ref 0–50)
ANION GAP SERPL CALCULATED.3IONS-SCNC: 10 MMOL/L (ref 7–15)
AST SERPL W P-5'-P-CCNC: 29 U/L (ref 0–45)
BILIRUB SERPL-MCNC: 0.4 MG/DL
BUN SERPL-MCNC: 13.4 MG/DL (ref 8–23)
CALCIUM SERPL-MCNC: 9.4 MG/DL (ref 8.8–10.4)
CHLORIDE SERPL-SCNC: 103 MMOL/L (ref 98–107)
CHOLEST SERPL-MCNC: 213 MG/DL
CREAT SERPL-MCNC: 0.71 MG/DL (ref 0.51–0.95)
CREAT UR-MCNC: 152 MG/DL
EGFRCR SERPLBLD CKD-EPI 2021: >90 ML/MIN/1.73M2
ERYTHROCYTE [DISTWIDTH] IN BLOOD BY AUTOMATED COUNT: 13.1 % (ref 10–15)
FASTING STATUS PATIENT QL REPORTED: YES
FASTING STATUS PATIENT QL REPORTED: YES
GLUCOSE SERPL-MCNC: 91 MG/DL (ref 70–99)
HCO3 SERPL-SCNC: 27 MMOL/L (ref 22–29)
HCT VFR BLD AUTO: 39.8 % (ref 35–47)
HDLC SERPL-MCNC: 56 MG/DL
HGB BLD-MCNC: 13.1 G/DL (ref 11.7–15.7)
LDLC SERPL CALC-MCNC: 132 MG/DL
MCH RBC QN AUTO: 31.3 PG (ref 26.5–33)
MCHC RBC AUTO-ENTMCNC: 32.9 G/DL (ref 31.5–36.5)
MCV RBC AUTO: 95 FL (ref 78–100)
MICROALBUMIN UR-MCNC: 12.9 MG/L
MICROALBUMIN/CREAT UR: 8.49 MG/G CR (ref 0–25)
NONHDLC SERPL-MCNC: 157 MG/DL
PLATELET # BLD AUTO: 197 10E3/UL (ref 150–450)
POTASSIUM SERPL-SCNC: 4.1 MMOL/L (ref 3.4–5.3)
PROT SERPL-MCNC: 7.2 G/DL (ref 6.4–8.3)
RBC # BLD AUTO: 4.19 10E6/UL (ref 3.8–5.2)
SODIUM SERPL-SCNC: 140 MMOL/L (ref 135–145)
TRIGL SERPL-MCNC: 127 MG/DL
WBC # BLD AUTO: 3.6 10E3/UL (ref 4–11)

## 2024-09-16 PROCEDURE — G0008 ADMIN INFLUENZA VIRUS VAC: HCPCS | Performed by: FAMILY MEDICINE

## 2024-09-16 PROCEDURE — 80053 COMPREHEN METABOLIC PANEL: CPT | Performed by: FAMILY MEDICINE

## 2024-09-16 PROCEDURE — 82570 ASSAY OF URINE CREATININE: CPT | Performed by: FAMILY MEDICINE

## 2024-09-16 PROCEDURE — G2211 COMPLEX E/M VISIT ADD ON: HCPCS | Performed by: FAMILY MEDICINE

## 2024-09-16 PROCEDURE — 85027 COMPLETE CBC AUTOMATED: CPT | Performed by: FAMILY MEDICINE

## 2024-09-16 PROCEDURE — 99214 OFFICE O/P EST MOD 30 MIN: CPT | Performed by: FAMILY MEDICINE

## 2024-09-16 PROCEDURE — 80061 LIPID PANEL: CPT | Performed by: FAMILY MEDICINE

## 2024-09-16 PROCEDURE — 36415 COLL VENOUS BLD VENIPUNCTURE: CPT | Performed by: FAMILY MEDICINE

## 2024-09-16 PROCEDURE — 82043 UR ALBUMIN QUANTITATIVE: CPT | Performed by: FAMILY MEDICINE

## 2024-09-16 PROCEDURE — 90662 IIV NO PRSV INCREASED AG IM: CPT | Performed by: FAMILY MEDICINE

## 2024-09-16 PROCEDURE — 96127 BRIEF EMOTIONAL/BEHAV ASSMT: CPT | Performed by: FAMILY MEDICINE

## 2024-09-16 RX ORDER — ASPIRIN 81 MG/1
81 TABLET ORAL DAILY
COMMUNITY

## 2024-09-16 RX ORDER — ESTRADIOL 10 UG/1
10 INSERT VAGINAL
Qty: 24 TABLET | Refills: 3 | Status: SHIPPED | OUTPATIENT
Start: 2024-09-16

## 2024-09-16 RX ORDER — CYANOCOBALAMIN (VITAMIN B-12) 500 MCG
400 LOZENGE ORAL DAILY
COMMUNITY

## 2024-09-16 ASSESSMENT — PAIN SCALES - GENERAL: PAINLEVEL: NO PAIN (0)

## 2024-09-16 NOTE — NURSING NOTE
Prior to immunization administration, verified patients identity using patient s name and date of birth. Please see Immunization Activity for additional information.     Screening Questionnaire for Adult Immunization    Are you sick today?   No   Do you have allergies to medications, food, a vaccine component or latex?   No   Have you ever had a serious reaction after receiving a vaccination?   No   Do you have a long-term health problem with heart, lung, kidney, or metabolic disease (e.g., diabetes), asthma, a blood disorder, no spleen, complement component deficiency, a cochlear implant, or a spinal fluid leak?  Are you on long-term aspirin therapy?   No   Do you have cancer, leukemia, HIV/AIDS, or any other immune system problem?   No   Do you have a parent, brother, or sister with an immune system problem?   No   In the past 3 months, have you taken medications that affect  your immune system, such as prednisone, other steroids, or anticancer drugs; drugs for the treatment of rheumatoid arthritis, Crohn s disease, or psoriasis; or have you had radiation treatments?   No   Have you had a seizure, or a brain or other nervous system problem?   No   During the past year, have you received a transfusion of blood or blood    products, or been given immune (gamma) globulin or antiviral drug?   No   For women: Are you pregnant or is there a chance you could become       pregnant during the next month?   No   Have you received any vaccinations in the past 4 weeks?   No     Immunization questionnaire answers were all negative.      Patient instructed to remain in clinic for 15 minutes afterwards, and to report any adverse reactions.     Screening performed by Martha Pantoja MA on 9/16/2024 at 2:29 PM.

## 2024-09-16 NOTE — PROGRESS NOTES
Preventive Care Visit  [facility]  Margarita Carlos MD, Family Medicine  Sep 16, 2024      Assessment & Plan     Mild persistent asthma without complication  Continues on Qvar for prevention along with albuterol for rescue.  Updated prescription sent.  - beclomethasone HFA (QVAR REDIHALER) 80 MCG/ACT inhaler; Inhale 1 puff into the lungs 2 times daily.    HTN, goal below 140/90  Normal labs without evidence of endorgan damage.  Continue current valsartan for blood pressure control.  Refill updated.  - CBC with platelets; Future  - Comprehensive metabolic panel (BMP + Alb, Alk Phos, ALT, AST, Total. Bili, TP); Future  - Albumin Random Urine Quantitative with Creat Ratio; Future  - CBC with platelets  - Comprehensive metabolic panel (BMP + Alb, Alk Phos, ALT, AST, Total. Bili, TP)  - Albumin Random Urine Quantitative with Creat Ratio    Hyperlipidemia LDL goal <160  Cholesterol levels are higher currently compared with previous.  If she continues to be take the simvastatin it may be wise to transition over to atorvastatin or rosuvastatin.  Message sent to patient and awaiting her reply.  - Lipid panel reflex to direct LDL Fasting; Future  - Lipid panel reflex to direct LDL Fasting    Major depressive disorder, recurrent episode, mild (HCC)  Has supply of bupropion available.  Continue current treatment recommended.  Follow-up if increased symptoms noted.    Atrophy of vagina  Has taken oral estrogen in the past with benefit for these symptoms.  Since going on the oral hormone supplement she has noticed significant worsening in symptoms.  Since her symptoms are only vaginal in nature, use of topical estrogen is recommended.  Prescription for vaginal estrogen suppositories sent in for her and she will use this twice weekly for at least a months to assess for response.  Ongoing use of lubricant with intercourse is recommended.  Replens was discussed which she has tried that with minimal  benefit.  - estradiol (VAGIFEM) 10 MCG TABS vaginal tablet; Place 1 tablet (10 mcg) vaginally twice a week.    Need for prophylactic vaccination and inoculation against influenza  Seasonal flu vaccine given today            Patient Instructions   Flu shot today.    Mammogram due after Dec 6.  Please call [phone] to set up this appointment.      Trial of vaginal estrogen given.  Use twice weekly.   Follow up if fails to control symptoms.     Labs today  I will send results when available and refills will be updated.        The longitudinal plan of care for the diagnosis(es)/condition(s) as documented were addressed during this visit. Due to the added complexity in care, I will continue to support Tomeka in the subsequent management and with ongoing continuity of care.        Katie Eckert is a 69 year old, presenting for the following:  Establish Care        9/16/2024     1:33 PM   Additional Questions   Roomed by Basilia WADE   Accompanied by self     History of Present Illness       Reason for visit:  Annual wellness visit and change primary doctor   She is taking medications regularly.    Postmenopausal:  Off - estrogen /tesosterone combo - switch to premarin but then that was discontinued - her primary symptom is vaginal dryness, dysparuenia.  Has tried a variety of OTC products  - [employer] - primrose oil, artificial estrogen - phytoestrogen and vitamin E.      Hypertension Follow-up    Do you check your blood pressure regularly outside of the clinic? No   Are you following a low salt diet? Yes  Are your blood pressures ever more than 140 on the top number (systolic) OR more   than 90 on the bottom number (diastolic), for example 140/90? No  Hyperlipidemia Follow-Up    Are you regularly taking any medication or supplement to lower your cholesterol?   Yes- simvastatin  Are you having muscle aches or other side effects that you think could be caused by your cholesterol lowering medication?  No    Depression   How  are you doing with your depression since your last visit? No change  Are you having other symptoms that might be associated with depression? No  Have you had a significant life event?  No   Are you feeling anxious or having panic attacks?   No  Do you have any concerns with your use of alcohol or other drugs? No    Social History     Tobacco Use    Smoking status: Never    Smokeless tobacco: Never   Vaping Use    Vaping status: Never Used   Substance Use Topics    Alcohol use: Yes     Comment: some wine/beer    Drug use: No         11/15/2022    11:54 AM 9/20/2023     9:27 AM 6/7/2024     8:14 AM   PHQ   PHQ-9 Total Score 0 3 0   Q9: Thoughts of better off dead/self-harm past 2 weeks Not at all Not at all Not at all         8/6/2019    10:06 AM 10/12/2020    10:02 AM 4/13/2021     4:48 PM   BENNY-7 SCORE   Total Score   1 (minimal anxiety)   Total Score 2 0 1         Suicide Assessment Five-step Evaluation and Treatment (SAFE-T)    Asthma Follow-Up    Was ACT completed today?  Yes        9/15/2024     6:01 PM   ACT Total Scores   ACT TOTAL SCORE (Goal Greater than or Equal to 20) 21   In the past 12 months, how many times did you visit the emergency room for your asthma without being admitted to the hospital? 0   In the past 12 months, how many times were you hospitalized overnight because of your asthma? 0      Triggers, food, smells, pollens  How many days per week do you miss taking your asthma controller medication?  0  Please describe any recent triggers for your asthma: upper respiratory infections, pollens, and mold  Have you had any Emergency Room Visits, Urgent Care Visits, or Hospital Admissions since your last office visit?  No        9/20/2023   General Health   Feel stress (tense, anxious, or unable to sleep) Not at all            9/13/2023   Nutrition   At least 4 servings of fruits and vegetables/day Yes            9/20/2023   Exercise   Days per week of moderate/strenous exercise 2 days             9/20/2023   Social Factors   Frequency of gathering with friends or relatives Once a week   Worry food won't last until get money to buy more No   Food not last or not have enough money for food? No   Do you have housing? (Housing is defined as stable permanent housing and does not include staying ouside in a car, in a tent, in an abandoned building, in an overnight shelter, or couch-surfing.) Yes   Are you worried about losing your housing? No   Lack of transportation? No   Unable to get utilities (heat,electricity)? No            9/16/2024   Fall Risk   Gait Speed Test (Document in seconds) 4.19   Gait Speed Test Interpretation Less than or equal to 5.00 seconds - PASS             9/13/2023   Activities of Daily Living- Home Safety   Needs help with the following daily activites NO assistance is needed   Safety concerns in the home None of the above            4/24/2018   Dental   Dentist two times every year? Yes            9/13/2023   Hearing Screening   Hearing concerns? No concerns                   Today's PHQ-9 Score:       6/7/2024     8:14 AM   PHQ-9 SCORE   PHQ-9 Total Score MyChart 0   PHQ-9 Total Score 0           9/13/2023   Substance Use   Alcohol more than 3/day or more than 7/wk No        Social History     Tobacco Use    Smoking status: Never    Smokeless tobacco: Never   Vaping Use    Vaping status: Never Used   Substance Use Topics    Alcohol use: Yes     Comment: some wine/beer    Drug use: No           12/6/2023   LAST FHS-7 RESULTS   1st degree relative breast or ovarian cancer No   Any relative bilateral breast cancer No   Any male have breast cancer No   Any ONE woman have BOTH breast AND ovarian cancer No   Any woman with breast cancer before 50yrs No   2 or more relatives with breast AND/OR ovarian cancer No   2 or more relatives with breast AND/OR bowel cancer No           Mammogram Screening - Mammogram every 1-2 years updated in Health Maintenance based on mutual decision  making      History of abnormal Pap smear: Status post hysterectomy with removal of cervix and no history of CIN2 or greater or cervical cancer. Health Maintenance and Surgical History updated.        12/22/2010    12:00 AM   PAP / HPV   PAP (Historical) NIL      ASCVD Risk   The 10-year ASCVD risk score (Inocencia NELSON, et al., 2019) is: 13.7%    Values used to calculate the score:      Age: 69 years      Sex: Female      Is Non- : No      Diabetic: No      Tobacco smoker: No      Systolic Blood Pressure: 136 mmHg      Is BP treated: Yes      HDL Cholesterol: 63 mg/dL      Total Cholesterol: 198 mg/dL          Reviewed and updated as needed this visit by Provider   Tobacco  Allergies  Meds   Med Hx  Surg Hx  Fam Hx            Past Medical History:   Diagnosis Date    Acne 3/28/2011    Decreased libido 10/14/2013    Dyslipidemia 11/15/2010    Family history of coronary artery disease 1/18/2012    GERD (gastroesophageal reflux disease) 11/15/2010    Gestational diabetes 4/22/2011    History of blood transfusion 1981    HTN, goal below 140/90 9/29/2014    Hyperlipidemia LDL goal <160 2/23/2011    Impaired fasting glucose 3/28/2011    Low HDL (under 40) 1/28/2013    Migraine headache 4/22/2011     (Problem list name updated by automated process. Provider to review and confirm.)    Mild major depression (H24) 12/22/2010    Mild persistent asthma 2/23/2011    Symptomatic states associated with artificial menopause 1/18/2012     Past Surgical History:   Procedure Laterality Date    BIOPSY  2009    left breast    COLONOSCOPY N/A 7/10/2015    Procedure: COLONOSCOPY;  Surgeon: Saul Aguilar MD;  Location:  GI    HYSTERECTOMY VAGINAL  1994    endometriosis    REPAIR PTOSIS BILATERAL  1/4/2013    Procedure: REPAIR PTOSIS BILATERAL;  BILATERAL UPPER LID MECHANICAL PTOSIS REPAIR;  Surgeon: Derek Allred MD;  Location:  EC    SLING BLADDER SUSPENSION WITH FASCIA KAYLYN  2004     bladder sling     SURGICAL HISTORY OF -       rt shoulder surgery      OB History    Para Term  AB Living   3 0 0 0 3 2   SAB IAB Ectopic Multiple Live Births   3 0 0 0 0      # Outcome Date GA Lbr Robe/2nd Weight Sex Type Anes PTL Lv   3 SAB            2 SAB            1 SAB               Obstetric Comments   3 miscarriages   2    lmp prior to hysterectomy   No vaginal spotting or bleedings   Hot flushes     BP Readings from Last 3 Encounters:   24 136/81   24 128/82   23 136/84    Wt Readings from Last 3 Encounters:   24 66.9 kg (147 lb 8 oz)   23 65.9 kg (145 lb 3.2 oz)   09/15/23 65.5 kg (144 lb 4.8 oz)                  Current providers sharing in care for this patient include:  Patient Care Team:  Aaseby-Aguilera, Ramona Ann, PA-C as PCP - General (Family Medicine)  Aaseby-Aguilera, Ramona Ann, PA-C as Assigned PCP    The following health maintenance items are reviewed in Epic and correct as of today:  Health Maintenance   Topic Date Due    RSV VACCINE (1 - Risk 60-74 years 1-dose series) Never done    ZOSTER IMMUNIZATION (2 of 2) 2020    ASTHMA ACTION PLAN  10/12/2021    DEXA  2022    INFLUENZA VACCINE (1) 2024    MEDICARE ANNUAL WELLNESS VISIT  2024    PHQ-9  2024    ASTHMA CONTROL TEST  2025    ANNUAL REVIEW OF HM ORDERS  2025    FALL RISK ASSESSMENT  2025    MAMMO SCREENING  2025    GLUCOSE  2026    COLORECTAL CANCER SCREENING  2028    LIPID  2028    ADVANCE CARE PLANNING  2028    DTAP/TDAP/TD IMMUNIZATION (3 - Td or Tdap) 11/15/2032    HEPATITIS C SCREENING  Completed    DEPRESSION ACTION PLAN  Completed    MIGRAINE ACTION PLAN  Completed    Pneumococcal Vaccine: 65+ Years  Completed    COVID-19 Vaccine  Completed    HPV IMMUNIZATION  Aged Out    MENINGITIS IMMUNIZATION  Aged Out    RSV MONOCLONAL ANTIBODY  Aged Out         Review of Systems  Constitutional, HEENT,  "cardiovascular, pulmonary, gi and gu systems are negative, except as otherwise noted.     Objective    Exam  /81 (BP Location: Right arm, Patient Position: Sitting, Cuff Size: Adult Regular)   Pulse 75   Temp 98.4  F (36.9  C) (Oral)   Resp 12   Ht 1.645 m (5' 4.75\")   Wt 66.9 kg (147 lb 8 oz)   LMP  (LMP Unknown)   SpO2 94%   BMI 24.74 kg/m     Estimated body mass index is 24.74 kg/m  as calculated from the following:    Height as of this encounter: 1.645 m (5' 4.75\").    Weight as of this encounter: 66.9 kg (147 lb 8 oz).    Physical Exam  GENERAL: alert and no distress  EYES: Eyes grossly normal to inspection, PERRL and conjunctivae and sclerae normal  HENT: ear canals and TM's normal, nose and mouth without ulcers or lesions  NECK: no adenopathy, no asymmetry, masses, or scars  RESP: lungs clear to auscultation - no rales, rhonchi or wheezes  CV: regular rate and rhythm, normal S1 S2, no S3 or S4, no murmur, click or rub, no peripheral edema  ABDOMEN: soft, nontender, no hepatosplenomegaly, no masses and bowel sounds normal  MS: no gross musculoskeletal defects noted, no edema  SKIN: no suspicious lesions or rashes  NEURO: Normal strength and tone, mentation intact and speech normal  PSYCH: mentation appears normal, affect normal/bright           Signed Electronically by: Margarita Carlos MD          This chart was documented by provider using a voice activated software called Dragon in addition to manual typing. There may be vocabulary errors or other grammatical errors due to this.     "

## 2024-09-16 NOTE — PATIENT INSTRUCTIONS
Flu shot today.    Mammogram due after Dec 6.  Please call [phone] to set up this appointment.      Trial of vaginal estrogen given.  Use twice weekly.   Follow up if fails to control symptoms.     Labs today  I will send results when available and refills will be updated.

## 2024-09-16 NOTE — LETTER
My Asthma Action Plan    Name: Breonna Caruso   YOB: 1955  Date: 9/16/2024   My doctor: Margarita Carlos MD   My clinic: [facility]        My Control Medicine: Beclomethasone dipropionate (Qvar Redihaler) -  80 mcg 1 puff twice a day.  My Rescue Medicine: Albuterol (Proair/Ventolin/Proventil HFA) 2-4 puffs EVERY 4 HOURS as needed. Use a spacer if recommended by your provider.   My Asthma Severity:   Mild Persistent  Know your asthma triggers:   upper respiratory infections  pollens  mold            GREEN ZONE   Good Control  I feel good  No cough or wheeze  Can work, sleep and play without asthma symptoms       Take your asthma control medicine every day.     If exercise triggers your asthma, take your rescue medication  15 minutes before exercise or sports, and  During exercise if you have asthma symptoms  Spacer to use with inhaler: If you have a spacer, make sure to use it with your inhaler             YELLOW ZONE Getting Worse  I have ANY of these:  I do not feel good  Cough or wheeze  Chest feels tight  Wake up at night   Keep taking your Green Zone medications  Start taking your rescue medicine:  every 20 minutes for up to 1 hour. Then every 4 hours for 24-48 hours.  If you stay in the Yellow Zone for more than 12-24 hours, contact your doctor.  If you do not return to the Green Zone in 12-24 hours or you get worse, start taking your oral steroid medicine if prescribed by your provider.           RED ZONE Medical Alert - Get Help  I have ANY of these:  I feel awful  Medicine is not helping  Breathing getting harder  Trouble walking or talking  Nose opens wide to breathe       Take your rescue medicine NOW  If your provider has prescribed an oral steroid medicine, start taking it NOW  Call your doctor NOW  If you are still in the Red Zone after 20 minutes and you have not reached your doctor:  Take your rescue medicine again and  Call 911 or go to the emergency room  right away    See your regular doctor within 2 weeks of an Emergency Room or Urgent Care visit for follow-up treatment.          Annual Reminders:  Meet with Asthma Educator,  Flu Shot in the Fall, consider Pneumonia Vaccination for patients with asthma (aged 19 and older).    Pharmacy:    [facility] - [place], [place] - [address]  [facility] - [place] - [address] [facility] AT PORTAL TO REGISTERED [facility] SIT[address]  [id number] - [place], [place] - [address] [id number] [address] [employer] - [place], [place] - [address]  [id number] - [facility] [place] - [address] AT CORNER OF [facility]    Electronically signed by Margarita Carlos MD   Date: 09/16/24                      Asthma Triggers  How To Control Things That Make Your Asthma Worse    Triggers are things that make your asthma worse.  Look at the list below to help you find your triggers and what you can do about them.  You can help prevent asthma flare-ups by staying away from your triggers.      Trigger                                                          What you can do   Cigarette Smoke  Tobacco smoke can make asthma worse. Do not allow smoking in your home, car or around you.  Be sure no one smokes at a child s day care or school.  If you smoke, ask your health care provider for ways to help you quit.  Ask family members to quit too.  Ask your health care provider for a referral to Quit Plan to help you quit smoking, or call 0-873-812-PLAN.     Colds, Flu, Bronchitis  These are common triggers of asthma. Wash your hands often.  Don t touch your eyes, nose or mouth.  Get a flu shot every year.     Dust Mites  These are tiny bugs that live in cloth or carpet. They are too small to see. Wash sheets and blankets in hot water every week.   Encase pillows and mattress in dust mite proof covers.  Avoid having carpet if you can. If you have carpet, vacuum weekly.   Use  a dust mask and HEPA vacuum.   Pollen and Outdoor Mold  Some people are allergic to trees, grass, or weed pollen, or molds. Try to keep your windows closed.  Limit time out doors when pollen count is high.   Ask you health care provider about taking medicine during allergy season.     Animal Dander  Some people are allergic to skin flakes, urine or saliva from pets with fur or feathers. Keep pets with fur or feathers out of your home.    If you can t keep the pet outdoors, then keep the pet out of your bedroom.  Keep the bedroom door closed.  Keep pets off cloth furniture and away from stuffed toys.     Mice, Rats, and Cockroaches   Some people are allergic to the waste from these pests.   Cover food and garbage.  Clean up spills and food crumbs.  Store grease in the refrigerator.   Keep food out of the bedroom.   Indoor Mold  This can be a trigger if your home has high moisture. Fix leaking faucets, pipes, or other sources of water.   Clean moldy surfaces.  Dehumidify basement if it is damp and smelly.   Smoke, Strong Odors, and Sprays  These can reduce air quality. Stay away from strong odors and sprays, such as perfume, powder, hair spray, paints, smoke incense, paint, cleaning products, candles and new carpet.   Exercise or Sports  Some people with asthma have this trigger. Be active!  Ask your doctor about taking medicine before sports or exercise to prevent symptoms.    Warm up for 5-10 minutes before and after sports or exercise.     Other Triggers of Asthma  Cold air:  Cover your nose and mouth with a scarf.  Sometimes laughing or crying can be a trigger.  Some medicines and food can trigger asthma.

## 2024-09-17 ENCOUNTER — MYC MEDICAL ADVICE (OUTPATIENT)
Dept: FAMILY MEDICINE | Facility: CLINIC | Age: 69
End: 2024-09-17
Payer: COMMERCIAL

## 2024-09-17 DIAGNOSIS — E78.5 HYPERLIPIDEMIA LDL GOAL <160: Primary | ICD-10-CM

## 2024-09-17 RX ORDER — VALSARTAN 80 MG/1
80 TABLET ORAL DAILY
Qty: 90 TABLET | Refills: 1 | Status: SHIPPED | OUTPATIENT
Start: 2024-09-17

## 2024-09-18 RX ORDER — ATORVASTATIN CALCIUM 40 MG/1
40 TABLET, FILM COATED ORAL DAILY
Qty: 90 TABLET | Refills: 3 | Status: SHIPPED | OUTPATIENT
Start: 2024-09-18

## 2024-11-17 ENCOUNTER — HEALTH MAINTENANCE LETTER (OUTPATIENT)
Age: 69
End: 2024-11-17

## 2024-12-05 ENCOUNTER — MYC MEDICAL ADVICE (OUTPATIENT)
Dept: FAMILY MEDICINE | Facility: CLINIC | Age: 69
End: 2024-12-05
Payer: COMMERCIAL

## 2024-12-05 DIAGNOSIS — D72.819 LEUKOPENIA, UNSPECIFIED TYPE: Primary | ICD-10-CM

## 2024-12-17 ENCOUNTER — LAB (OUTPATIENT)
Dept: LAB | Facility: CLINIC | Age: 69
End: 2024-12-17
Payer: COMMERCIAL

## 2024-12-17 DIAGNOSIS — D72.819 LEUKOPENIA, UNSPECIFIED TYPE: ICD-10-CM

## 2024-12-17 LAB
BASOPHILS # BLD AUTO: 0 10E3/UL (ref 0–0.2)
BASOPHILS NFR BLD AUTO: 0 %
EOSINOPHIL # BLD AUTO: 0 10E3/UL (ref 0–0.7)
EOSINOPHIL NFR BLD AUTO: 1 %
ERYTHROCYTE [DISTWIDTH] IN BLOOD BY AUTOMATED COUNT: 12.7 % (ref 10–15)
HCT VFR BLD AUTO: 42.8 % (ref 35–47)
HGB BLD-MCNC: 14 G/DL (ref 11.7–15.7)
IMM GRANULOCYTES # BLD: 0 10E3/UL
IMM GRANULOCYTES NFR BLD: 0 %
LYMPHOCYTES # BLD AUTO: 1.3 10E3/UL (ref 0.8–5.3)
LYMPHOCYTES NFR BLD AUTO: 33 %
MCH RBC QN AUTO: 30.7 PG (ref 26.5–33)
MCHC RBC AUTO-ENTMCNC: 32.7 G/DL (ref 31.5–36.5)
MCV RBC AUTO: 94 FL (ref 78–100)
MONOCYTES # BLD AUTO: 0.4 10E3/UL (ref 0–1.3)
MONOCYTES NFR BLD AUTO: 10 %
NEUTROPHILS # BLD AUTO: 2.1 10E3/UL (ref 1.6–8.3)
NEUTROPHILS NFR BLD AUTO: 56 %
PLATELET # BLD AUTO: 186 10E3/UL (ref 150–450)
RBC # BLD AUTO: 4.56 10E6/UL (ref 3.8–5.2)
WBC # BLD AUTO: 3.8 10E3/UL (ref 4–11)

## 2024-12-17 PROCEDURE — 85025 COMPLETE CBC W/AUTO DIFF WBC: CPT

## 2024-12-17 PROCEDURE — 36415 COLL VENOUS BLD VENIPUNCTURE: CPT

## 2024-12-19 NOTE — RESULT ENCOUNTER NOTE
Your WBC (white blood cell) count remains borderline low.  It is higher than it was a few months ago.  In review of your past records, your normal appears to be on the lower end of the range and you have had occasional lows in the past.  Back in 2021 you had a couple of low values which then improved in 2022.  Current levels are not worrisome and I would recommend reevaluation with your upcoming scheduled appointment in May.  Please call or MyChart message me if you have any questions.      MELVIN

## 2024-12-29 ENCOUNTER — MYC MEDICAL ADVICE (OUTPATIENT)
Dept: FAMILY MEDICINE | Facility: CLINIC | Age: 69
End: 2024-12-29
Payer: COMMERCIAL

## 2024-12-30 ENCOUNTER — TELEPHONE (OUTPATIENT)
Dept: FAMILY MEDICINE | Facility: CLINIC | Age: 69
End: 2024-12-30
Payer: COMMERCIAL

## 2024-12-30 NOTE — TELEPHONE ENCOUNTER
This writer attempted to contact the patient on 12/30/24    Reason for call: Triage symptoms:      There was no answer at phone number [phone] provided in the patient's chart, so left message. Will also respond to [facility] and advise pt of options for urgent care and ED evaluation depending on severity of symptoms.      If patient calls back:   Registered Nurse called. Follow Triage Call workflow        [place] RN

## 2025-01-13 NOTE — ADDENDUM NOTE
Addended by: ALANNAH BUSH on: 8/19/2024 12:49 PM     Modules accepted: Orders     Chief Complaint   Patient presents with    RECHECK     /70 (BP Location: Right arm, Patient Position: Sitting, Cuff Size: Adult Regular)   Pulse 70   Wt 80.3 kg (177 lb)   SpO2 100%   BMI 26.14 kg/m

## 2025-01-17 ENCOUNTER — ANCILLARY PROCEDURE (OUTPATIENT)
Dept: MAMMOGRAPHY | Facility: CLINIC | Age: 70
End: 2025-01-17
Payer: COMMERCIAL

## 2025-01-17 DIAGNOSIS — Z12.31 VISIT FOR SCREENING MAMMOGRAM: ICD-10-CM

## 2025-01-17 PROCEDURE — 77067 SCR MAMMO BI INCL CAD: CPT | Performed by: RADIOLOGY

## 2025-01-17 PROCEDURE — 77063 BREAST TOMOSYNTHESIS BI: CPT | Performed by: RADIOLOGY

## 2025-01-24 ENCOUNTER — ANCILLARY PROCEDURE (OUTPATIENT)
Dept: ULTRASOUND IMAGING | Facility: CLINIC | Age: 70
End: 2025-01-24
Attending: FAMILY MEDICINE
Payer: COMMERCIAL

## 2025-01-24 ENCOUNTER — ANCILLARY PROCEDURE (OUTPATIENT)
Dept: MAMMOGRAPHY | Facility: CLINIC | Age: 70
End: 2025-01-24
Attending: FAMILY MEDICINE
Payer: COMMERCIAL

## 2025-01-24 DIAGNOSIS — R92.8 ABNORMAL MAMMOGRAM: ICD-10-CM

## 2025-01-24 PROCEDURE — 77065 DX MAMMO INCL CAD UNI: CPT | Mod: LT | Performed by: RADIOLOGY

## 2025-01-24 PROCEDURE — G0279 TOMOSYNTHESIS, MAMMO: HCPCS | Performed by: RADIOLOGY

## 2025-01-24 PROCEDURE — 76642 ULTRASOUND BREAST LIMITED: CPT | Mod: LT | Performed by: RADIOLOGY

## 2025-03-05 ENCOUNTER — PATIENT OUTREACH (OUTPATIENT)
Dept: CARE COORDINATION | Facility: CLINIC | Age: 70
End: 2025-03-05
Payer: COMMERCIAL

## 2025-03-19 ENCOUNTER — PATIENT OUTREACH (OUTPATIENT)
Dept: CARE COORDINATION | Facility: CLINIC | Age: 70
End: 2025-03-19
Payer: COMMERCIAL

## 2025-05-17 DIAGNOSIS — I10 HTN, GOAL BELOW 140/90: ICD-10-CM

## 2025-05-19 RX ORDER — VALSARTAN 80 MG/1
80 TABLET ORAL DAILY
Qty: 90 TABLET | Refills: 0 | Status: SHIPPED | OUTPATIENT
Start: 2025-05-19

## 2025-08-12 ENCOUNTER — TELEPHONE (OUTPATIENT)
Dept: FAMILY MEDICINE | Facility: CLINIC | Age: 70
End: 2025-08-12
Payer: COMMERCIAL

## 2025-08-20 DIAGNOSIS — I10 HTN, GOAL BELOW 140/90: ICD-10-CM

## 2025-08-20 RX ORDER — VALSARTAN 80 MG/1
80 TABLET ORAL DAILY
Qty: 90 TABLET | Refills: 0 | Status: SHIPPED | OUTPATIENT
Start: 2025-08-20

## 2025-09-02 DIAGNOSIS — E78.5 HYPERLIPIDEMIA LDL GOAL <160: ICD-10-CM

## 2025-09-02 RX ORDER — ATORVASTATIN CALCIUM 40 MG/1
40 TABLET, FILM COATED ORAL DAILY
Qty: 90 TABLET | Refills: 0 | Status: SHIPPED | OUTPATIENT
Start: 2025-09-02